# Patient Record
Sex: FEMALE | Race: OTHER | NOT HISPANIC OR LATINO | Employment: OTHER | RURAL
[De-identification: names, ages, dates, MRNs, and addresses within clinical notes are randomized per-mention and may not be internally consistent; named-entity substitution may affect disease eponyms.]

---

## 2020-03-09 ENCOUNTER — HISTORICAL (OUTPATIENT)
Dept: ADMINISTRATIVE | Facility: HOSPITAL | Age: 75
End: 2020-03-09

## 2020-07-14 ENCOUNTER — HISTORICAL (OUTPATIENT)
Dept: ADMINISTRATIVE | Facility: HOSPITAL | Age: 75
End: 2020-07-14

## 2020-07-14 LAB — GLUCOSE SERPL-MCNC: 88 MG/DL (ref 70–105)

## 2020-07-15 ENCOUNTER — HISTORICAL (OUTPATIENT)
Dept: ADMINISTRATIVE | Facility: HOSPITAL | Age: 75
End: 2020-07-15

## 2020-07-15 LAB
ALBUMIN SERPL BCP-MCNC: 2.5 G/DL (ref 3.5–5)
ALP SERPL-CCNC: 81 U/L (ref 55–142)
ALT SERPL W P-5'-P-CCNC: 10 U/L (ref 13–56)
ANION GAP SERPL CALCULATED.3IONS-SCNC: 11 MMOL/L
ANISOCYTOSIS BLD QL SMEAR: ABNORMAL
AST SERPL W P-5'-P-CCNC: 20 U/L (ref 15–37)
BASOPHILS # BLD AUTO: 0.04 X10E3/UL (ref 0–0.2)
BASOPHILS NFR BLD AUTO: 0.6 % (ref 0–1)
BILIRUB DIRECT SERPL-MCNC: 0.1 MG/DL (ref 0–0.2)
BILIRUB SERPL-MCNC: 0.4 MG/DL (ref 0–1.2)
BUN SERPL-MCNC: 14 MG/DL (ref 7–18)
CALCIUM SERPL-MCNC: 9 MG/DL (ref 8.5–10.1)
CHLORIDE SERPL-SCNC: 100 MMOL/L (ref 98–107)
CK MB SERPL-MCNC: 2.2 NG/ML (ref 1–3.6)
CK MB SERPL-MCNC: 2.3 NG/ML (ref 1–3.6)
CK MB SERPL-MCNC: 2.3 NG/ML (ref 1–3.6)
CK SERPL-CCNC: 42 U/L (ref 26–192)
CK SERPL-CCNC: 60 U/L (ref 26–192)
CK SERPL-CCNC: 66 U/L (ref 26–192)
CO2 SERPL-SCNC: 33 MMOL/L (ref 21–32)
CREAT SERPL-MCNC: 1.03 MG/DL (ref 0.55–1.02)
EOSINOPHIL # BLD AUTO: 0.29 X10E3/UL (ref 0–0.5)
EOSINOPHIL NFR BLD AUTO: 4.3 % (ref 1–4)
EOSINOPHIL NFR BLD MANUAL: 5 % (ref 1–4)
ERYTHROCYTE [DISTWIDTH] IN BLOOD BY AUTOMATED COUNT: 16.2 % (ref 11.5–14.5)
GLUCOSE SERPL-MCNC: 103 MG/DL (ref 70–105)
GLUCOSE SERPL-MCNC: 104 MG/DL (ref 74–106)
GLUCOSE SERPL-MCNC: 127 MG/DL (ref 70–105)
GLUCOSE SERPL-MCNC: 182 MG/DL (ref 70–105)
GLUCOSE SERPL-MCNC: 219 MG/DL (ref 70–105)
HCT VFR BLD AUTO: 36.8 % (ref 38–47)
HGB BLD-MCNC: 11.4 G/DL (ref 12–16)
IMM GRANULOCYTES # BLD AUTO: 0.01 X10E3/UL (ref 0–0.04)
IMM GRANULOCYTES NFR BLD: 0.1 % (ref 0–0.4)
LYMPHOCYTES # BLD AUTO: 0.79 X10E3/UL (ref 1–4.8)
LYMPHOCYTES NFR BLD AUTO: 11.6 % (ref 27–41)
LYMPHOCYTES NFR BLD MANUAL: 17 % (ref 27–41)
MAGNESIUM SERPL-MCNC: 1.8 MG/DL (ref 1.7–2.3)
MCH RBC QN AUTO: 22.7 PG (ref 27–31)
MCHC RBC AUTO-ENTMCNC: 31 G/DL (ref 32–36)
MCV RBC AUTO: 73.2 FL (ref 80–96)
MICROCYTES BLD QL SMEAR: ABNORMAL
MONOCYTES # BLD AUTO: 0.39 X10E3/UL (ref 0–0.8)
MONOCYTES NFR BLD AUTO: 5.7 % (ref 2–6)
MONOCYTES NFR BLD MANUAL: 4 % (ref 2–6)
MPC BLD CALC-MCNC: 10.5 FL (ref 9.4–12.4)
NEUTROPHILS # BLD AUTO: 5.27 X10E3/UL (ref 1.8–7.7)
NEUTROPHILS NFR BLD AUTO: 77.7 % (ref 53–65)
NEUTS SEG NFR BLD MANUAL: 74 % (ref 50–62)
NRBC # BLD AUTO: 0 X10E3/UL (ref 0–0)
NRBC, AUTO (.00): 0 /100 (ref 0–0)
NT-PROBNP SERPL-MCNC: 6800 PG/ML (ref 1–125)
PLATELET # BLD AUTO: 281 X10E3/UL (ref 150–400)
PLATELET MORPHOLOGY: ABNORMAL
POTASSIUM SERPL-SCNC: 4.7 MMOL/L (ref 3.5–5.1)
PREALB SERPL NEPH-MCNC: 40 MG/DL (ref 20–40)
PROT SERPL-MCNC: 7.9 G/DL (ref 6.4–8.2)
RBC # BLD AUTO: 5.03 X10E6/UL (ref 4.2–5.4)
SODIUM SERPL-SCNC: 139 MMOL/L (ref 136–145)
TROPONIN I SERPL-MCNC: 0.05 NG/ML (ref 0–0.06)
TROPONIN I SERPL-MCNC: 0.28 NG/ML (ref 0–0.06)
TROPONIN I SERPL-MCNC: 0.39 NG/ML (ref 0–0.06)
WBC # BLD AUTO: 6.79 X10E3/UL (ref 4.5–11)

## 2020-07-16 ENCOUNTER — HISTORICAL (OUTPATIENT)
Dept: ADMINISTRATIVE | Facility: HOSPITAL | Age: 75
End: 2020-07-16

## 2020-07-16 LAB
ANION GAP SERPL CALCULATED.3IONS-SCNC: 10 MMOL/L
ANISOCYTOSIS BLD QL SMEAR: ABNORMAL
BASOPHILS # BLD AUTO: 0.06 X10E3/UL (ref 0–0.2)
BASOPHILS NFR BLD AUTO: 0.9 % (ref 0–1)
BUN SERPL-MCNC: 13 MG/DL (ref 7–18)
CALCIUM SERPL-MCNC: 8.9 MG/DL (ref 8.5–10.1)
CHLORIDE SERPL-SCNC: 100 MMOL/L (ref 98–107)
CO2 SERPL-SCNC: 33 MMOL/L (ref 21–32)
CREAT SERPL-MCNC: 1.03 MG/DL (ref 0.55–1.02)
EOSINOPHIL # BLD AUTO: 0.45 X10E3/UL (ref 0–0.5)
EOSINOPHIL NFR BLD AUTO: 7 % (ref 1–4)
ERYTHROCYTE [DISTWIDTH] IN BLOOD BY AUTOMATED COUNT: 15.7 % (ref 11.5–14.5)
GLUCOSE SERPL-MCNC: 118 MG/DL (ref 70–105)
GLUCOSE SERPL-MCNC: 121 MG/DL (ref 74–106)
GLUCOSE SERPL-MCNC: 140 MG/DL (ref 70–105)
GLUCOSE SERPL-MCNC: 154 MG/DL (ref 70–105)
GLUCOSE SERPL-MCNC: 217 MG/DL (ref 70–105)
HCT VFR BLD AUTO: 36.8 % (ref 38–47)
HGB BLD-MCNC: 11.5 G/DL (ref 12–16)
HYPOCHROMIA BLD QL SMEAR: SLIGHT
IMM GRANULOCYTES # BLD AUTO: 0.01 X10E3/UL (ref 0–0.04)
IMM GRANULOCYTES NFR BLD: 0.2 % (ref 0–0.4)
LYMPHOCYTES # BLD AUTO: 0.97 X10E3/UL (ref 1–4.8)
LYMPHOCYTES NFR BLD AUTO: 15.1 % (ref 27–41)
MCH RBC QN AUTO: 22.5 PG (ref 27–31)
MCHC RBC AUTO-ENTMCNC: 31.3 G/DL (ref 32–36)
MCV RBC AUTO: 71.9 FL (ref 80–96)
MICROCYTES BLD QL SMEAR: ABNORMAL
MONOCYTES # BLD AUTO: 0.52 X10E3/UL (ref 0–0.8)
MONOCYTES NFR BLD AUTO: 8.1 % (ref 2–6)
MPC BLD CALC-MCNC: 11.6 FL (ref 9.4–12.4)
NEUTROPHILS # BLD AUTO: 4.4 X10E3/UL (ref 1.8–7.7)
NEUTROPHILS NFR BLD AUTO: 68.7 % (ref 53–65)
NRBC # BLD AUTO: 0 X10E3/UL (ref 0–0)
NRBC, AUTO (.00): 0 /100 (ref 0–0)
OVALOCYTES BLD QL SMEAR: ABNORMAL
PLATELET # BLD AUTO: 219 X10E3/UL (ref 150–400)
PLATELET MORPHOLOGY: NORMAL
POLYCHROMASIA BLD QL SMEAR: ABNORMAL
POTASSIUM SERPL-SCNC: 3.9 MMOL/L (ref 3.5–5.1)
RBC # BLD AUTO: 5.12 X10E6/UL (ref 4.2–5.4)
SODIUM SERPL-SCNC: 139 MMOL/L (ref 136–145)
TARGETS BLD QL SMEAR: ABNORMAL
WBC # BLD AUTO: 6.41 X10E3/UL (ref 4.5–11)

## 2020-07-17 ENCOUNTER — HISTORICAL (OUTPATIENT)
Dept: ADMINISTRATIVE | Facility: HOSPITAL | Age: 75
End: 2020-07-17

## 2020-07-17 LAB
GLUCOSE SERPL-MCNC: 143 MG/DL (ref 70–105)
GLUCOSE SERPL-MCNC: 152 MG/DL (ref 70–105)
GLUCOSE SERPL-MCNC: 203 MG/DL (ref 70–105)
GLUCOSE SERPL-MCNC: 227 MG/DL (ref 70–105)
SARS-COV-2 RNA AMPLIFICATION, QUAL: NEGATIVE

## 2020-07-18 ENCOUNTER — HISTORICAL (OUTPATIENT)
Dept: ADMINISTRATIVE | Facility: HOSPITAL | Age: 75
End: 2020-07-18

## 2020-07-18 LAB
ANION GAP SERPL CALCULATED.3IONS-SCNC: 6 MMOL/L
ANISOCYTOSIS BLD QL SMEAR: ABNORMAL
BASOPHILS # BLD AUTO: 0.04 X10E3/UL (ref 0–0.2)
BASOPHILS NFR BLD AUTO: 0.6 % (ref 0–1)
BUN SERPL-MCNC: 11 MG/DL (ref 7–18)
CALCIUM SERPL-MCNC: 8.8 MG/DL (ref 8.5–10.1)
CHLORIDE SERPL-SCNC: 102 MMOL/L (ref 98–107)
CO2 SERPL-SCNC: 36 MMOL/L (ref 21–32)
CREAT SERPL-MCNC: 0.99 MG/DL (ref 0.55–1.02)
EOSINOPHIL # BLD AUTO: 0.47 X10E3/UL (ref 0–0.5)
EOSINOPHIL NFR BLD AUTO: 7.3 % (ref 1–4)
ERYTHROCYTE [DISTWIDTH] IN BLOOD BY AUTOMATED COUNT: 14.9 % (ref 11.5–14.5)
GLUCOSE SERPL-MCNC: 110 MG/DL (ref 74–106)
GLUCOSE SERPL-MCNC: 117 MG/DL (ref 70–105)
GLUCOSE SERPL-MCNC: 238 MG/DL (ref 70–105)
HCT VFR BLD AUTO: 34.9 % (ref 38–47)
HGB BLD-MCNC: 10.8 G/DL (ref 12–16)
HYPOCHROMIA BLD QL SMEAR: ABNORMAL
IMM GRANULOCYTES # BLD AUTO: 0.01 X10E3/UL (ref 0–0.04)
IMM GRANULOCYTES NFR BLD: 0.2 % (ref 0–0.4)
LYMPHOCYTES # BLD AUTO: 1.22 X10E3/UL (ref 1–4.8)
LYMPHOCYTES NFR BLD AUTO: 18.8 % (ref 27–41)
MAGNESIUM SERPL-MCNC: 1.6 MG/DL (ref 1.7–2.3)
MCH RBC QN AUTO: 22.8 PG (ref 27–31)
MCHC RBC AUTO-ENTMCNC: 30.9 G/DL (ref 32–36)
MCV RBC AUTO: 73.6 FL (ref 80–96)
MICROCYTES BLD QL SMEAR: ABNORMAL
MONOCYTES # BLD AUTO: 0.57 X10E3/UL (ref 0–0.8)
MONOCYTES NFR BLD AUTO: 8.8 % (ref 2–6)
MPC BLD CALC-MCNC: 11.8 FL (ref 9.4–12.4)
NEUTROPHILS # BLD AUTO: 4.17 X10E3/UL (ref 1.8–7.7)
NEUTROPHILS NFR BLD AUTO: 64.3 % (ref 53–65)
NRBC # BLD AUTO: 0 X10E3/UL (ref 0–0)
NRBC, AUTO (.00): 0 /100 (ref 0–0)
NT-PROBNP SERPL-MCNC: 4863 PG/ML (ref 1–125)
OVALOCYTES BLD QL SMEAR: ABNORMAL
PLATELET # BLD AUTO: 249 X10E3/UL (ref 150–400)
PLATELET MORPHOLOGY: NORMAL
POLYCHROMASIA BLD QL SMEAR: ABNORMAL
POTASSIUM SERPL-SCNC: 3.4 MMOL/L (ref 3.5–5.1)
RBC # BLD AUTO: 4.74 X10E6/UL (ref 4.2–5.4)
SODIUM SERPL-SCNC: 141 MMOL/L (ref 136–145)
T4 SERPL-MCNC: 8.6 UG/DL (ref 4.8–13.9)
TARGETS BLD QL SMEAR: ABNORMAL
TSH SERPL DL<=0.005 MIU/L-ACNC: 1.54 UIU/ML (ref 0.36–3.74)
WBC # BLD AUTO: 6.48 X10E3/UL (ref 4.5–11)

## 2020-10-07 ENCOUNTER — HISTORICAL (OUTPATIENT)
Dept: ADMINISTRATIVE | Facility: HOSPITAL | Age: 75
End: 2020-10-07

## 2021-03-05 DIAGNOSIS — Z12.31 SCREENING MAMMOGRAM FOR HIGH-RISK PATIENT: Primary | ICD-10-CM

## 2021-04-08 ENCOUNTER — OFFICE VISIT (OUTPATIENT)
Dept: INTERNAL MEDICINE | Facility: CLINIC | Age: 76
End: 2021-04-08
Payer: COMMERCIAL

## 2021-04-08 VITALS
RESPIRATION RATE: 19 BRPM | SYSTOLIC BLOOD PRESSURE: 148 MMHG | HEART RATE: 74 BPM | OXYGEN SATURATION: 93 % | DIASTOLIC BLOOD PRESSURE: 68 MMHG

## 2021-04-08 DIAGNOSIS — E08.00 DIABETES MELLITUS DUE TO UNDERLYING CONDITION WITH HYPEROSMOLARITY WITHOUT COMA, WITHOUT LONG-TERM CURRENT USE OF INSULIN: ICD-10-CM

## 2021-04-08 DIAGNOSIS — R01.1 HEART MURMUR: ICD-10-CM

## 2021-04-08 DIAGNOSIS — J30.1 SEASONAL ALLERGIC RHINITIS DUE TO POLLEN: ICD-10-CM

## 2021-04-08 DIAGNOSIS — K21.9 GASTROESOPHAGEAL REFLUX DISEASE, UNSPECIFIED WHETHER ESOPHAGITIS PRESENT: ICD-10-CM

## 2021-04-08 DIAGNOSIS — L30.9 DERMATITIS: ICD-10-CM

## 2021-04-08 DIAGNOSIS — I10 ESSENTIAL HYPERTENSION: Primary | Chronic | ICD-10-CM

## 2021-04-08 PROCEDURE — 99213 OFFICE O/P EST LOW 20 MIN: CPT | Mod: S$PBB,,, | Performed by: INTERNAL MEDICINE

## 2021-04-08 PROCEDURE — 99999 PR PBB SHADOW E&M-EST. PATIENT-LVL III: ICD-10-PCS | Mod: PBBFAC,,, | Performed by: INTERNAL MEDICINE

## 2021-04-08 PROCEDURE — 99213 OFFICE O/P EST LOW 20 MIN: CPT | Mod: PBBFAC | Performed by: INTERNAL MEDICINE

## 2021-04-08 PROCEDURE — 99999 PR PBB SHADOW E&M-EST. PATIENT-LVL III: CPT | Mod: PBBFAC,,, | Performed by: INTERNAL MEDICINE

## 2021-04-08 PROCEDURE — 99213 PR OFFICE/OUTPT VISIT, EST, LEVL III, 20-29 MIN: ICD-10-PCS | Mod: S$PBB,,, | Performed by: INTERNAL MEDICINE

## 2021-04-08 RX ORDER — FUROSEMIDE 20 MG/1
1 TABLET ORAL 2 TIMES DAILY
COMMUNITY
Start: 2021-02-19 | End: 2021-08-03

## 2021-04-08 RX ORDER — LORATADINE 10 MG/1
10 TABLET ORAL DAILY
COMMUNITY
End: 2021-04-08 | Stop reason: SDUPTHER

## 2021-04-08 RX ORDER — LOSARTAN POTASSIUM 100 MG/1
100 TABLET ORAL DAILY
COMMUNITY
End: 2021-09-14 | Stop reason: SDUPTHER

## 2021-04-08 RX ORDER — OMEPRAZOLE 20 MG/1
1 CAPSULE, DELAYED RELEASE ORAL DAILY
COMMUNITY
Start: 2021-02-19 | End: 2021-04-08 | Stop reason: SDUPTHER

## 2021-04-08 RX ORDER — METOPROLOL TARTRATE 25 MG/1
1 TABLET, FILM COATED ORAL 3 TIMES DAILY
COMMUNITY
Start: 2021-02-20 | End: 2021-07-20 | Stop reason: SDUPTHER

## 2021-04-08 RX ORDER — TRIAMCINOLONE ACETONIDE 1 MG/G
CREAM TOPICAL
COMMUNITY
Start: 2021-03-03 | End: 2021-04-08 | Stop reason: SDUPTHER

## 2021-04-08 RX ORDER — DULOXETIN HYDROCHLORIDE 30 MG/1
30 CAPSULE, DELAYED RELEASE ORAL DAILY
Status: ON HOLD | COMMUNITY
End: 2021-08-03

## 2021-04-08 RX ORDER — TRIAMCINOLONE ACETONIDE 1 MG/G
CREAM TOPICAL 2 TIMES DAILY
Qty: 454 G | Refills: 1 | Status: SHIPPED | OUTPATIENT
Start: 2021-04-08 | End: 2021-07-20 | Stop reason: SDUPTHER

## 2021-04-08 RX ORDER — AMLODIPINE BESYLATE 5 MG/1
1 TABLET ORAL DAILY
COMMUNITY
Start: 2021-02-20 | End: 2021-08-25 | Stop reason: SDUPTHER

## 2021-04-08 RX ORDER — PREDNISONE 5 MG/1
5 TABLET ORAL DAILY PRN
COMMUNITY
Start: 2021-02-19 | End: 2022-08-16 | Stop reason: DRUGHIGH

## 2021-04-08 RX ORDER — SYRINGE AND NEEDLE,INSULIN,1ML 31GX15/64"
SYRINGE, EMPTY DISPOSABLE MISCELLANEOUS
COMMUNITY

## 2021-04-08 RX ORDER — LORATADINE 10 MG/1
10 TABLET ORAL DAILY
Qty: 90 TABLET | Refills: 1 | Status: SHIPPED | OUTPATIENT
Start: 2021-04-08 | End: 2021-12-01 | Stop reason: SDUPTHER

## 2021-04-08 RX ORDER — VENLAFAXINE HYDROCHLORIDE 75 MG/1
1 CAPSULE, EXTENDED RELEASE ORAL EVERY OTHER DAY
COMMUNITY
Start: 2020-12-30 | End: 2022-01-10

## 2021-04-08 RX ORDER — PANTOPRAZOLE SODIUM 40 MG/1
40 TABLET, DELAYED RELEASE ORAL DAILY
Qty: 60 TABLET | Refills: 6 | Status: SHIPPED | OUTPATIENT
Start: 2021-04-08 | End: 2021-09-14 | Stop reason: SDUPTHER

## 2021-04-08 RX ORDER — SYRINGE AND NEEDLE,INSULIN,1ML 31GX15/64"
SYRINGE, EMPTY DISPOSABLE MISCELLANEOUS 2 TIMES DAILY
Qty: 200 SYRINGE | Refills: 5 | Status: CANCELLED | OUTPATIENT
Start: 2021-04-08

## 2021-04-08 RX ORDER — ATORVASTATIN CALCIUM 40 MG/1
1 TABLET, FILM COATED ORAL DAILY
COMMUNITY
Start: 2021-02-19 | End: 2021-08-03

## 2021-04-08 RX ORDER — HUMAN INSULIN 100 [USP'U]/ML
INJECTION, SUSPENSION SUBCUTANEOUS 2 TIMES DAILY
COMMUNITY
Start: 2021-01-11 | End: 2022-02-02

## 2021-04-08 RX ORDER — OMEPRAZOLE 20 MG/1
20 CAPSULE, DELAYED RELEASE ORAL DAILY
Qty: 90 CAPSULE | Refills: 1 | Status: SHIPPED | OUTPATIENT
Start: 2021-04-08 | End: 2021-08-03

## 2021-04-08 RX ORDER — METHOTREXATE 2.5 MG/1
TABLET ORAL
Status: ON HOLD | COMMUNITY
Start: 2021-02-20 | End: 2021-08-03

## 2021-04-08 RX ORDER — HYDRALAZINE HYDROCHLORIDE 25 MG/1
1 TABLET, FILM COATED ORAL 4 TIMES DAILY
COMMUNITY
Start: 2021-02-19 | End: 2021-08-03

## 2021-05-10 ENCOUNTER — OFFICE VISIT (OUTPATIENT)
Dept: CARDIOLOGY | Facility: CLINIC | Age: 76
End: 2021-05-10
Payer: COMMERCIAL

## 2021-05-10 VITALS
HEIGHT: 67 IN | OXYGEN SATURATION: 95 % | HEART RATE: 74 BPM | RESPIRATION RATE: 20 BRPM | DIASTOLIC BLOOD PRESSURE: 70 MMHG | SYSTOLIC BLOOD PRESSURE: 128 MMHG

## 2021-05-10 DIAGNOSIS — Z95.1 S/P 2-VESSEL CORONARY ARTERY BYPASS: ICD-10-CM

## 2021-05-10 DIAGNOSIS — I42.0 DILATED CARDIOMYOPATHY: ICD-10-CM

## 2021-05-10 DIAGNOSIS — I21.9 MYOCARDIAL INFARCTION, UNSPECIFIED MI TYPE, UNSPECIFIED ARTERY: ICD-10-CM

## 2021-05-10 DIAGNOSIS — I25.10 CORONARY ARTERY DISEASE INVOLVING NATIVE CORONARY ARTERY OF NATIVE HEART, ANGINA PRESENCE UNSPECIFIED: ICD-10-CM

## 2021-05-10 DIAGNOSIS — I11.0 HYPERTENSIVE HEART DISEASE WITH CHRONIC COMBINED SYSTOLIC AND DIASTOLIC CONGESTIVE HEART FAILURE: ICD-10-CM

## 2021-05-10 DIAGNOSIS — I50.42 HYPERTENSIVE HEART DISEASE WITH CHRONIC COMBINED SYSTOLIC AND DIASTOLIC CONGESTIVE HEART FAILURE: ICD-10-CM

## 2021-05-10 DIAGNOSIS — I25.10 CORONARY ARTERY DISEASE, ANGINA PRESENCE UNSPECIFIED, UNSPECIFIED VESSEL OR LESION TYPE, UNSPECIFIED WHETHER NATIVE OR TRANSPLANTED HEART: Primary | ICD-10-CM

## 2021-05-10 DIAGNOSIS — I27.20 PULMONARY HYPERTENSION: ICD-10-CM

## 2021-05-10 DIAGNOSIS — J44.9 CHRONIC OBSTRUCTIVE PULMONARY DISEASE, UNSPECIFIED COPD TYPE: ICD-10-CM

## 2021-05-10 DIAGNOSIS — M06.9 RHEUMATOID ARTHRITIS, INVOLVING UNSPECIFIED SITE, UNSPECIFIED WHETHER RHEUMATOID FACTOR PRESENT: ICD-10-CM

## 2021-05-10 PROCEDURE — 93010 EKG 12-LEAD: ICD-10-PCS | Mod: S$PBB,,, | Performed by: INTERNAL MEDICINE

## 2021-05-10 PROCEDURE — 93010 ELECTROCARDIOGRAM REPORT: CPT | Mod: S$PBB,,, | Performed by: INTERNAL MEDICINE

## 2021-05-10 PROCEDURE — 93005 ELECTROCARDIOGRAM TRACING: CPT | Mod: PBBFAC | Performed by: INTERNAL MEDICINE

## 2021-05-10 PROCEDURE — 99214 OFFICE O/P EST MOD 30 MIN: CPT | Mod: S$PBB,,, | Performed by: NURSE PRACTITIONER

## 2021-05-10 PROCEDURE — 99214 PR OFFICE/OUTPT VISIT, EST, LEVL IV, 30-39 MIN: ICD-10-PCS | Mod: S$PBB,,, | Performed by: NURSE PRACTITIONER

## 2021-05-10 PROCEDURE — 99215 HC OFFICE/OUTPT VISIT, EST, LEVL V, 40-54 MIN: ICD-10-PCS | Mod: PBBFAC,25,, | Performed by: NURSE PRACTITIONER

## 2021-05-10 PROCEDURE — 99215 OFFICE O/P EST HI 40 MIN: CPT | Mod: PBBFAC,25,, | Performed by: NURSE PRACTITIONER

## 2021-05-10 PROCEDURE — 99215 OFFICE O/P EST HI 40 MIN: CPT | Mod: PBBFAC | Performed by: NURSE PRACTITIONER

## 2021-05-10 RX ORDER — ZOLPIDEM TARTRATE 5 MG/1
5 TABLET ORAL NIGHTLY PRN
COMMUNITY
End: 2022-09-21 | Stop reason: SDUPTHER

## 2021-05-10 RX ORDER — ALBUTEROL SULFATE 90 UG/1
AEROSOL, METERED RESPIRATORY (INHALATION)
COMMUNITY
Start: 2021-02-20 | End: 2021-11-23

## 2021-05-10 RX ORDER — NAPROXEN 500 MG/1
500 TABLET ORAL 2 TIMES DAILY PRN
COMMUNITY
Start: 2021-04-29 | End: 2022-10-11 | Stop reason: SDUPTHER

## 2021-05-13 RX ORDER — HYDRALAZINE HYDROCHLORIDE 25 MG/1
25 TABLET, FILM COATED ORAL 3 TIMES DAILY
Qty: 90 TABLET | Refills: 1 | Status: SHIPPED | OUTPATIENT
Start: 2021-05-13 | End: 2021-09-14 | Stop reason: SDUPTHER

## 2021-05-13 RX ORDER — SYRINGE,SAFETY WITH NEEDLE,1ML 25GX1"
SYRINGE (EA) MISCELLANEOUS
Qty: 200 EACH | Refills: 3 | Status: SHIPPED | OUTPATIENT
Start: 2021-05-13 | End: 2022-04-20 | Stop reason: SDUPTHER

## 2021-05-13 RX ORDER — ATORVASTATIN CALCIUM 40 MG/1
40 TABLET, FILM COATED ORAL DAILY
Qty: 90 TABLET | Refills: 1 | Status: SHIPPED | OUTPATIENT
Start: 2021-05-13 | End: 2021-10-21 | Stop reason: SDUPTHER

## 2021-07-20 DIAGNOSIS — L30.9 DERMATITIS: ICD-10-CM

## 2021-07-20 RX ORDER — METOPROLOL TARTRATE 25 MG/1
25 TABLET, FILM COATED ORAL 3 TIMES DAILY
Qty: 90 TABLET | Refills: 0 | Status: SHIPPED | OUTPATIENT
Start: 2021-07-20 | End: 2021-08-25 | Stop reason: SDUPTHER

## 2021-07-20 RX ORDER — TRIAMCINOLONE ACETONIDE 1 MG/G
CREAM TOPICAL 2 TIMES DAILY
Qty: 454 G | Refills: 1 | Status: SHIPPED | OUTPATIENT
Start: 2021-07-20 | End: 2021-08-25 | Stop reason: SDUPTHER

## 2021-08-03 ENCOUNTER — HOSPITAL ENCOUNTER (INPATIENT)
Facility: HOSPITAL | Age: 76
LOS: 3 days | Discharge: ANOTHER HOSPITAL | DRG: 291 | End: 2021-08-06
Attending: INTERNAL MEDICINE | Admitting: INTERNAL MEDICINE
Payer: COMMERCIAL

## 2021-08-03 ENCOUNTER — OFFICE VISIT (OUTPATIENT)
Dept: FAMILY MEDICINE | Facility: CLINIC | Age: 76
End: 2021-08-03
Payer: COMMERCIAL

## 2021-08-03 VITALS
OXYGEN SATURATION: 88 % | HEIGHT: 67 IN | HEART RATE: 78 BPM | RESPIRATION RATE: 20 BRPM | SYSTOLIC BLOOD PRESSURE: 153 MMHG | DIASTOLIC BLOOD PRESSURE: 80 MMHG

## 2021-08-03 DIAGNOSIS — R06.02 SHORTNESS OF BREATH: ICD-10-CM

## 2021-08-03 DIAGNOSIS — R06.02 SOB (SHORTNESS OF BREATH) ON EXERTION: ICD-10-CM

## 2021-08-03 DIAGNOSIS — I50.23 ACUTE ON CHRONIC SYSTOLIC CONGESTIVE HEART FAILURE: ICD-10-CM

## 2021-08-03 DIAGNOSIS — I50.9 CONGESTIVE HEART FAILURE, UNSPECIFIED HF CHRONICITY, UNSPECIFIED HEART FAILURE TYPE: Primary | ICD-10-CM

## 2021-08-03 DIAGNOSIS — U07.1 COVID-19 VIRUS INFECTION: Primary | ICD-10-CM

## 2021-08-03 DIAGNOSIS — S81.802A WOUND OF LEFT LOWER EXTREMITY, INITIAL ENCOUNTER: ICD-10-CM

## 2021-08-03 DIAGNOSIS — I10 ESSENTIAL HYPERTENSION: ICD-10-CM

## 2021-08-03 DIAGNOSIS — R09.89 LUNG CRACKLES: ICD-10-CM

## 2021-08-03 DIAGNOSIS — I10 ESSENTIAL HYPERTENSION: Chronic | ICD-10-CM

## 2021-08-03 DIAGNOSIS — E11.59 TYPE 2 DIABETES MELLITUS WITH OTHER CIRCULATORY COMPLICATION, UNSPECIFIED WHETHER LONG TERM INSULIN USE: Chronic | ICD-10-CM

## 2021-08-03 PROBLEM — E11.9 DIABETES MELLITUS, TYPE 2: Chronic | Status: ACTIVE | Noted: 2021-08-03

## 2021-08-03 LAB
ANION GAP SERPL CALCULATED.3IONS-SCNC: 10 MMOL/L (ref 7–16)
ANION GAP SERPL CALCULATED.3IONS-SCNC: 8 MMOL/L (ref 7–16)
BASOPHILS # BLD AUTO: 0.03 K/UL (ref 0–0.2)
BASOPHILS # BLD AUTO: 0.07 K/UL (ref 0–0.2)
BASOPHILS NFR BLD AUTO: 0.4 % (ref 0–1)
BASOPHILS NFR BLD AUTO: 0.7 % (ref 0–1)
BUN SERPL-MCNC: 23 MG/DL (ref 7–18)
BUN SERPL-MCNC: 24 MG/DL (ref 7–18)
BUN/CREAT SERPL: 24 (ref 6–20)
BUN/CREAT SERPL: 27 (ref 6–20)
CALCIUM SERPL-MCNC: 9.1 MG/DL (ref 8.5–10.1)
CALCIUM SERPL-MCNC: 9.2 MG/DL (ref 8.5–10.1)
CHLORIDE SERPL-SCNC: 106 MMOL/L (ref 98–107)
CHLORIDE SERPL-SCNC: 107 MMOL/L (ref 98–107)
CO2 SERPL-SCNC: 32 MMOL/L (ref 21–32)
CO2 SERPL-SCNC: 32 MMOL/L (ref 21–32)
CREAT SERPL-MCNC: 0.88 MG/DL (ref 0.55–1.02)
CREAT SERPL-MCNC: 0.97 MG/DL (ref 0.55–1.02)
D DIMER PPP FEU-MCNC: 1.79 ΜG/ML (ref 0–0.47)
DIFFERENTIAL METHOD BLD: ABNORMAL
DIFFERENTIAL METHOD BLD: ABNORMAL
EOSINOPHIL # BLD AUTO: 0.17 K/UL (ref 0–0.5)
EOSINOPHIL # BLD AUTO: 0.18 K/UL (ref 0–0.5)
EOSINOPHIL NFR BLD AUTO: 1.9 % (ref 1–4)
EOSINOPHIL NFR BLD AUTO: 2 % (ref 1–4)
ERYTHROCYTE [DISTWIDTH] IN BLOOD BY AUTOMATED COUNT: 15.9 % (ref 11.5–14.5)
ERYTHROCYTE [DISTWIDTH] IN BLOOD BY AUTOMATED COUNT: 18.3 % (ref 11.5–14.5)
FLUAV AG UPPER RESP QL IA.RAPID: NEGATIVE
FLUBV AG UPPER RESP QL IA.RAPID: NEGATIVE
GLUCOSE SERPL-MCNC: 45 MG/DL (ref 74–106)
GLUCOSE SERPL-MCNC: 47 MG/DL (ref 70–105)
GLUCOSE SERPL-MCNC: 47 MG/DL (ref 74–106)
GLUCOSE SERPL-MCNC: 88 MG/DL (ref 70–105)
HCT VFR BLD AUTO: 37.6 % (ref 38–47)
HCT VFR BLD AUTO: 40.2 % (ref 38–47)
HGB BLD-MCNC: 12.1 G/DL (ref 12–16)
HGB BLD-MCNC: 12.9 G/DL (ref 12–16)
IMM GRANULOCYTES # BLD AUTO: 0.01 K/UL (ref 0–0.04)
IMM GRANULOCYTES # BLD AUTO: 0.02 K/UL (ref 0–0.04)
IMM GRANULOCYTES NFR BLD: 0.1 % (ref 0–0.4)
IMM GRANULOCYTES NFR BLD: 0.2 % (ref 0–0.4)
LYMPHOCYTES # BLD AUTO: 0.97 K/UL (ref 1–4.8)
LYMPHOCYTES # BLD AUTO: 1.32 K/UL (ref 1–4.8)
LYMPHOCYTES NFR BLD AUTO: 11.6 % (ref 27–41)
LYMPHOCYTES NFR BLD AUTO: 14 % (ref 27–41)
MAGNESIUM SERPL-MCNC: 1.8 MG/DL (ref 1.7–2.3)
MCH RBC QN AUTO: 24.8 PG (ref 27–31)
MCH RBC QN AUTO: 26.4 PG (ref 27–31)
MCHC RBC AUTO-ENTMCNC: 32.1 G/DL (ref 32–36)
MCHC RBC AUTO-ENTMCNC: 32.2 G/DL (ref 32–36)
MCV RBC AUTO: 77 FL (ref 80–96)
MCV RBC AUTO: 82.4 FL (ref 80–96)
MONOCYTES # BLD AUTO: 0.42 K/UL (ref 0–0.8)
MONOCYTES # BLD AUTO: 0.42 K/UL (ref 0–0.8)
MONOCYTES NFR BLD AUTO: 4.4 % (ref 2–6)
MONOCYTES NFR BLD AUTO: 5 % (ref 2–6)
MPC BLD CALC-MCNC: 10.6 FL (ref 9.4–12.4)
MPC BLD CALC-MCNC: 12.1 FL (ref 9.4–12.4)
NEUTROPHILS # BLD AUTO: 6.73 K/UL (ref 1.8–7.7)
NEUTROPHILS # BLD AUTO: 7.44 K/UL (ref 1.8–7.7)
NEUTROPHILS NFR BLD AUTO: 78.8 % (ref 53–65)
NEUTROPHILS NFR BLD AUTO: 80.9 % (ref 53–65)
NRBC # BLD AUTO: 0 X10E3/UL
NRBC # BLD AUTO: 0 X10E3/UL
NRBC, AUTO (.00): 0 %
NRBC, AUTO (.00): 0 %
NT-PROBNP SERPL-MCNC: 2453 PG/ML (ref 1–125)
NT-PROBNP SERPL-MCNC: 2643 PG/ML (ref 1–125)
PLATELET # BLD AUTO: 231 K/UL (ref 150–400)
PLATELET # BLD AUTO: 309 K/UL (ref 150–400)
POTASSIUM SERPL-SCNC: 4.2 MMOL/L (ref 3.5–5.1)
POTASSIUM SERPL-SCNC: 4.2 MMOL/L (ref 3.5–5.1)
RBC # BLD AUTO: 4.88 M/UL (ref 4.2–5.4)
RBC # BLD AUTO: 4.88 M/UL (ref 4.2–5.4)
SARS-COV+SARS-COV-2 AG RESP QL IA.RAPID: POSITIVE
SODIUM SERPL-SCNC: 143 MMOL/L (ref 136–145)
SODIUM SERPL-SCNC: 144 MMOL/L (ref 136–145)
WBC # BLD AUTO: 8.33 K/UL (ref 4.5–11)
WBC # BLD AUTO: 9.45 K/UL (ref 4.5–11)

## 2021-08-03 PROCEDURE — 99284 EMERGENCY DEPT VISIT MOD MDM: CPT | Mod: ,,, | Performed by: NURSE PRACTITIONER

## 2021-08-03 PROCEDURE — 87428 SARSCOV & INF VIR A&B AG IA: CPT | Performed by: NURSE PRACTITIONER

## 2021-08-03 PROCEDURE — 80048 BASIC METABOLIC PNL TOTAL CA: CPT | Performed by: FAMILY MEDICINE

## 2021-08-03 PROCEDURE — 85025 COMPLETE CBC W/AUTO DIFF WBC: CPT | Performed by: FAMILY MEDICINE

## 2021-08-03 PROCEDURE — 85025 CBC WITH DIFFERENTIAL: ICD-10-PCS | Mod: QW,,, | Performed by: CLINICAL MEDICAL LABORATORY

## 2021-08-03 PROCEDURE — 93010 ELECTROCARDIOGRAM REPORT: CPT | Mod: ,,, | Performed by: HOSPITALIST

## 2021-08-03 PROCEDURE — 99214 OFFICE O/P EST MOD 30 MIN: CPT | Mod: ,,, | Performed by: INTERNAL MEDICINE

## 2021-08-03 PROCEDURE — 93010 EKG 12-LEAD: ICD-10-PCS | Mod: ,,, | Performed by: HOSPITALIST

## 2021-08-03 PROCEDURE — 63600175 PHARM REV CODE 636 W HCPCS: Performed by: NURSE PRACTITIONER

## 2021-08-03 PROCEDURE — 99223 1ST HOSP IP/OBS HIGH 75: CPT | Mod: GC,,, | Performed by: INTERNAL MEDICINE

## 2021-08-03 PROCEDURE — 99223 PR INITIAL HOSPITAL CARE,LEVL III: ICD-10-PCS | Mod: GC,,, | Performed by: INTERNAL MEDICINE

## 2021-08-03 PROCEDURE — 83735 ASSAY OF MAGNESIUM: CPT | Performed by: NURSE PRACTITIONER

## 2021-08-03 PROCEDURE — 93005 ELECTROCARDIOGRAM TRACING: CPT

## 2021-08-03 PROCEDURE — 83880 NT-PRO NATRIURETIC PEPTIDE: ICD-10-PCS | Mod: QW,,, | Performed by: CLINICAL MEDICAL LABORATORY

## 2021-08-03 PROCEDURE — 83880 ASSAY OF NATRIURETIC PEPTIDE: CPT | Mod: QW,,, | Performed by: CLINICAL MEDICAL LABORATORY

## 2021-08-03 PROCEDURE — 85378 FIBRIN DEGRADE SEMIQUANT: CPT | Performed by: NURSE PRACTITIONER

## 2021-08-03 PROCEDURE — 80048 BASIC METABOLIC PNL TOTAL CA: CPT | Mod: QW,,, | Performed by: CLINICAL MEDICAL LABORATORY

## 2021-08-03 PROCEDURE — 82962 GLUCOSE BLOOD TEST: CPT

## 2021-08-03 PROCEDURE — 85025 COMPLETE CBC W/AUTO DIFF WBC: CPT | Mod: QW,,, | Performed by: CLINICAL MEDICAL LABORATORY

## 2021-08-03 PROCEDURE — 80048 BASIC METABOLIC PANEL: ICD-10-PCS | Mod: QW,,, | Performed by: CLINICAL MEDICAL LABORATORY

## 2021-08-03 PROCEDURE — 36415 COLL VENOUS BLD VENIPUNCTURE: CPT | Performed by: NURSE PRACTITIONER

## 2021-08-03 PROCEDURE — 99285 EMERGENCY DEPT VISIT HI MDM: CPT | Mod: 25

## 2021-08-03 PROCEDURE — 11000001 HC ACUTE MED/SURG PRIVATE ROOM

## 2021-08-03 PROCEDURE — 99284 PR EMERGENCY DEPT VISIT,LEVEL IV: ICD-10-PCS | Mod: ,,, | Performed by: NURSE PRACTITIONER

## 2021-08-03 PROCEDURE — 83880 ASSAY OF NATRIURETIC PEPTIDE: CPT | Performed by: FAMILY MEDICINE

## 2021-08-03 PROCEDURE — 96374 THER/PROPH/DIAG INJ IV PUSH: CPT

## 2021-08-03 PROCEDURE — 99214 PR OFFICE/OUTPT VISIT, EST, LEVL IV, 30-39 MIN: ICD-10-PCS | Mod: ,,, | Performed by: INTERNAL MEDICINE

## 2021-08-03 RX ORDER — TALC
6 POWDER (GRAM) TOPICAL NIGHTLY PRN
Status: DISCONTINUED | OUTPATIENT
Start: 2021-08-03 | End: 2021-08-06 | Stop reason: HOSPADM

## 2021-08-03 RX ORDER — SODIUM CHLORIDE 0.9 % (FLUSH) 0.9 %
10 SYRINGE (ML) INJECTION
Status: DISCONTINUED | OUTPATIENT
Start: 2021-08-03 | End: 2021-08-06 | Stop reason: HOSPADM

## 2021-08-03 RX ORDER — FUROSEMIDE 40 MG/1
40 TABLET ORAL 2 TIMES DAILY
Qty: 60 TABLET | Refills: 1 | Status: SHIPPED | OUTPATIENT
Start: 2021-08-03 | End: 2021-08-25 | Stop reason: SDUPTHER

## 2021-08-03 RX ORDER — AMLODIPINE BESYLATE 5 MG/1
5 TABLET ORAL DAILY
Status: DISCONTINUED | OUTPATIENT
Start: 2021-08-04 | End: 2021-08-06 | Stop reason: HOSPADM

## 2021-08-03 RX ORDER — ONDANSETRON 2 MG/ML
4 INJECTION INTRAMUSCULAR; INTRAVENOUS EVERY 8 HOURS PRN
Status: DISCONTINUED | OUTPATIENT
Start: 2021-08-03 | End: 2021-08-06 | Stop reason: HOSPADM

## 2021-08-03 RX ORDER — POTASSIUM CHLORIDE 750 MG/1
10 TABLET, EXTENDED RELEASE ORAL 2 TIMES DAILY
Qty: 60 TABLET | Refills: 0 | Status: SHIPPED | OUTPATIENT
Start: 2021-08-03 | End: 2022-02-15 | Stop reason: DRUGHIGH

## 2021-08-03 RX ORDER — METOPROLOL TARTRATE 25 MG/1
12.5 TABLET ORAL 3 TIMES DAILY
Status: DISCONTINUED | OUTPATIENT
Start: 2021-08-04 | End: 2021-08-06 | Stop reason: HOSPADM

## 2021-08-03 RX ORDER — GLUCAGON 1 MG
1 KIT INJECTION
Status: DISCONTINUED | OUTPATIENT
Start: 2021-08-03 | End: 2021-08-06 | Stop reason: HOSPADM

## 2021-08-03 RX ORDER — ACETAMINOPHEN 325 MG/1
650 TABLET ORAL EVERY 4 HOURS PRN
Status: DISCONTINUED | OUTPATIENT
Start: 2021-08-03 | End: 2021-08-06 | Stop reason: HOSPADM

## 2021-08-03 RX ORDER — MUPIROCIN 20 MG/G
OINTMENT TOPICAL 3 TIMES DAILY
Qty: 1 TUBE | Refills: 1 | Status: SHIPPED | OUTPATIENT
Start: 2021-08-03 | End: 2022-02-15 | Stop reason: DRUGHIGH

## 2021-08-03 RX ORDER — HYDRALAZINE HYDROCHLORIDE 25 MG/1
25 TABLET, FILM COATED ORAL 3 TIMES DAILY
Status: DISCONTINUED | OUTPATIENT
Start: 2021-08-04 | End: 2021-08-06 | Stop reason: HOSPADM

## 2021-08-03 RX ORDER — FUROSEMIDE 80 MG/1
TABLET ORAL
Qty: 6 TABLET | Refills: 0 | Status: ON HOLD | OUTPATIENT
Start: 2021-08-03 | End: 2021-08-03

## 2021-08-03 RX ORDER — DULOXETIN HYDROCHLORIDE 30 MG/1
30 CAPSULE, DELAYED RELEASE ORAL DAILY
Status: DISCONTINUED | OUTPATIENT
Start: 2021-08-04 | End: 2021-08-06 | Stop reason: HOSPADM

## 2021-08-03 RX ORDER — LANOLIN ALCOHOL/MO/W.PET/CERES
800 CREAM (GRAM) TOPICAL
Status: DISCONTINUED | OUTPATIENT
Start: 2021-08-03 | End: 2021-08-06 | Stop reason: HOSPADM

## 2021-08-03 RX ORDER — POTASSIUM CHLORIDE 750 MG/1
10 TABLET, EXTENDED RELEASE ORAL 2 TIMES DAILY
Status: DISCONTINUED | OUTPATIENT
Start: 2021-08-04 | End: 2021-08-06 | Stop reason: HOSPADM

## 2021-08-03 RX ORDER — METOPROLOL TARTRATE 25 MG/1
25 TABLET, FILM COATED ORAL 3 TIMES DAILY
Status: DISCONTINUED | OUTPATIENT
Start: 2021-08-04 | End: 2021-08-03

## 2021-08-03 RX ORDER — PANTOPRAZOLE SODIUM 40 MG/1
40 TABLET, DELAYED RELEASE ORAL DAILY
Status: DISCONTINUED | OUTPATIENT
Start: 2021-08-04 | End: 2021-08-06 | Stop reason: HOSPADM

## 2021-08-03 RX ORDER — INSULIN ASPART 100 [IU]/ML
0-5 INJECTION, SOLUTION INTRAVENOUS; SUBCUTANEOUS
Status: DISCONTINUED | OUTPATIENT
Start: 2021-08-03 | End: 2021-08-06 | Stop reason: HOSPADM

## 2021-08-03 RX ORDER — FUROSEMIDE 10 MG/ML
40 INJECTION INTRAMUSCULAR; INTRAVENOUS
Status: COMPLETED | OUTPATIENT
Start: 2021-08-03 | End: 2021-08-03

## 2021-08-03 RX ORDER — ATORVASTATIN CALCIUM 40 MG/1
40 TABLET, FILM COATED ORAL DAILY
Status: DISCONTINUED | OUTPATIENT
Start: 2021-08-04 | End: 2021-08-06 | Stop reason: HOSPADM

## 2021-08-03 RX ORDER — FUROSEMIDE 10 MG/ML
40 INJECTION INTRAMUSCULAR; INTRAVENOUS EVERY 8 HOURS
Status: DISCONTINUED | OUTPATIENT
Start: 2021-08-03 | End: 2021-08-06 | Stop reason: HOSPADM

## 2021-08-03 RX ORDER — LOSARTAN POTASSIUM 100 MG/1
100 TABLET ORAL DAILY
Status: DISCONTINUED | OUTPATIENT
Start: 2021-08-04 | End: 2021-08-06 | Stop reason: HOSPADM

## 2021-08-03 RX ORDER — ALBUTEROL SULFATE 90 UG/1
1 AEROSOL, METERED RESPIRATORY (INHALATION) EVERY 4 HOURS PRN
Status: DISCONTINUED | OUTPATIENT
Start: 2021-08-03 | End: 2021-08-06 | Stop reason: HOSPADM

## 2021-08-03 RX ORDER — ASPIRIN 81 MG/1
81 TABLET ORAL DAILY
COMMUNITY

## 2021-08-03 RX ADMIN — FUROSEMIDE 40 MG: 10 INJECTION, SOLUTION INTRAVENOUS at 06:08

## 2021-08-04 LAB
ALBUMIN SERPL BCP-MCNC: 2.7 G/DL (ref 3.5–5)
ALP SERPL-CCNC: 98 U/L (ref 55–142)
ALT SERPL W P-5'-P-CCNC: 31 U/L (ref 13–56)
ANION GAP SERPL CALCULATED.3IONS-SCNC: 12 MMOL/L (ref 7–16)
APTT PPP: 30.8 SECONDS (ref 25.2–37.3)
AST SERPL W P-5'-P-CCNC: 24 U/L (ref 15–37)
BASOPHILS # BLD AUTO: 0.06 K/UL (ref 0–0.2)
BASOPHILS NFR BLD AUTO: 0.8 % (ref 0–1)
BILIRUB DIRECT SERPL-MCNC: 0.1 MG/DL (ref 0–0.2)
BILIRUB SERPL-MCNC: 0.4 MG/DL (ref 0–1.2)
BUN SERPL-MCNC: 22 MG/DL (ref 7–18)
BUN/CREAT SERPL: 23 (ref 6–20)
CALCIUM SERPL-MCNC: 9 MG/DL (ref 8.5–10.1)
CHLORIDE SERPL-SCNC: 105 MMOL/L (ref 98–107)
CO2 SERPL-SCNC: 33 MMOL/L (ref 21–32)
CREAT SERPL-MCNC: 0.95 MG/DL (ref 0.55–1.02)
DIFFERENTIAL METHOD BLD: ABNORMAL
EOSINOPHIL # BLD AUTO: 0.29 K/UL (ref 0–0.5)
EOSINOPHIL NFR BLD AUTO: 3.9 % (ref 1–4)
ERYTHROCYTE [DISTWIDTH] IN BLOOD BY AUTOMATED COUNT: 15.4 % (ref 11.5–14.5)
GLUCOSE SERPL-MCNC: 141 MG/DL (ref 70–105)
GLUCOSE SERPL-MCNC: 74 MG/DL (ref 74–106)
GLUCOSE SERPL-MCNC: 80 MG/DL (ref 70–105)
GLUCOSE SERPL-MCNC: 84 MG/DL (ref 70–105)
GLUCOSE SERPL-MCNC: 97 MG/DL (ref 70–105)
HCT VFR BLD AUTO: 37.6 % (ref 38–47)
HGB BLD-MCNC: 12 G/DL (ref 12–16)
IMM GRANULOCYTES # BLD AUTO: 0.02 K/UL (ref 0–0.04)
IMM GRANULOCYTES NFR BLD: 0.3 % (ref 0–0.4)
INR BLD: 1.05 (ref 0.9–1.1)
LYMPHOCYTES # BLD AUTO: 1.19 K/UL (ref 1–4.8)
LYMPHOCYTES NFR BLD AUTO: 15.9 % (ref 27–41)
MCH RBC QN AUTO: 24.7 PG (ref 27–31)
MCHC RBC AUTO-ENTMCNC: 31.9 G/DL (ref 32–36)
MCV RBC AUTO: 77.4 FL (ref 80–96)
MONOCYTES # BLD AUTO: 0.39 K/UL (ref 0–0.8)
MONOCYTES NFR BLD AUTO: 5.2 % (ref 2–6)
MPC BLD CALC-MCNC: 11.5 FL (ref 9.4–12.4)
NEUTROPHILS # BLD AUTO: 5.54 K/UL (ref 1.8–7.7)
NEUTROPHILS NFR BLD AUTO: 73.9 % (ref 53–65)
NRBC # BLD AUTO: 0 X10E3/UL
NRBC, AUTO (.00): 0 %
PLATELET # BLD AUTO: 273 K/UL (ref 150–400)
POTASSIUM SERPL-SCNC: 3.7 MMOL/L (ref 3.5–5.1)
PROT SERPL-MCNC: 6.7 G/DL (ref 6.4–8.2)
PROTHROMBIN TIME: 13.7 SECONDS (ref 11.7–14.7)
RBC # BLD AUTO: 4.86 M/UL (ref 4.2–5.4)
SODIUM SERPL-SCNC: 146 MMOL/L (ref 136–145)
TROPONIN I SERPL-MCNC: 0.17 NG/ML
TROPONIN I SERPL-MCNC: 0.18 NG/ML
TROPONIN I SERPL-MCNC: 0.21 NG/ML
WBC # BLD AUTO: 7.49 K/UL (ref 4.5–11)

## 2021-08-04 PROCEDURE — 63600175 PHARM REV CODE 636 W HCPCS: Performed by: INTERNAL MEDICINE

## 2021-08-04 PROCEDURE — 63600175 PHARM REV CODE 636 W HCPCS: Performed by: REGISTERED NURSE

## 2021-08-04 PROCEDURE — 99232 SBSQ HOSP IP/OBS MODERATE 35: CPT | Mod: ,,, | Performed by: HOSPITALIST

## 2021-08-04 PROCEDURE — 36415 COLL VENOUS BLD VENIPUNCTURE: CPT | Performed by: REGISTERED NURSE

## 2021-08-04 PROCEDURE — 25000242 PHARM REV CODE 250 ALT 637 W/ HCPCS: Performed by: REGISTERED NURSE

## 2021-08-04 PROCEDURE — 99232 PR SUBSEQUENT HOSPITAL CARE,LEVL II: ICD-10-PCS | Mod: ,,, | Performed by: HOSPITALIST

## 2021-08-04 PROCEDURE — 27100098 HC SPACER

## 2021-08-04 PROCEDURE — 93010 ELECTROCARDIOGRAM REPORT: CPT | Mod: ,,, | Performed by: HOSPITALIST

## 2021-08-04 PROCEDURE — 94761 N-INVAS EAR/PLS OXIMETRY MLT: CPT

## 2021-08-04 PROCEDURE — 85730 THROMBOPLASTIN TIME PARTIAL: CPT | Performed by: HOSPITALIST

## 2021-08-04 PROCEDURE — 25000003 PHARM REV CODE 250: Performed by: REGISTERED NURSE

## 2021-08-04 PROCEDURE — 84484 ASSAY OF TROPONIN QUANT: CPT | Performed by: REGISTERED NURSE

## 2021-08-04 PROCEDURE — 11000001 HC ACUTE MED/SURG PRIVATE ROOM

## 2021-08-04 PROCEDURE — 27000221 HC OXYGEN, UP TO 24 HOURS

## 2021-08-04 PROCEDURE — 84484 ASSAY OF TROPONIN QUANT: CPT | Performed by: HOSPITALIST

## 2021-08-04 PROCEDURE — 85610 PROTHROMBIN TIME: CPT | Performed by: HOSPITALIST

## 2021-08-04 PROCEDURE — 85025 COMPLETE CBC W/AUTO DIFF WBC: CPT | Performed by: HOSPITALIST

## 2021-08-04 PROCEDURE — 82248 BILIRUBIN DIRECT: CPT | Performed by: HOSPITALIST

## 2021-08-04 PROCEDURE — 25000003 PHARM REV CODE 250: Performed by: INTERNAL MEDICINE

## 2021-08-04 PROCEDURE — 94640 AIRWAY INHALATION TREATMENT: CPT

## 2021-08-04 PROCEDURE — 93005 ELECTROCARDIOGRAM TRACING: CPT

## 2021-08-04 PROCEDURE — 36415 COLL VENOUS BLD VENIPUNCTURE: CPT | Performed by: HOSPITALIST

## 2021-08-04 PROCEDURE — 82962 GLUCOSE BLOOD TEST: CPT

## 2021-08-04 PROCEDURE — 93010 EKG 12-LEAD: ICD-10-PCS | Mod: ,,, | Performed by: HOSPITALIST

## 2021-08-04 RX ORDER — ZINC SULFATE 50(220)MG
220 CAPSULE ORAL DAILY
Status: DISCONTINUED | OUTPATIENT
Start: 2021-08-04 | End: 2021-08-06 | Stop reason: HOSPADM

## 2021-08-04 RX ORDER — DEXAMETHASONE 4 MG/1
4 TABLET ORAL EVERY 12 HOURS
Status: DISCONTINUED | OUTPATIENT
Start: 2021-08-04 | End: 2021-08-06 | Stop reason: HOSPADM

## 2021-08-04 RX ORDER — ALBUTEROL SULFATE 90 UG/1
2 AEROSOL, METERED RESPIRATORY (INHALATION) EVERY 8 HOURS
Status: DISCONTINUED | OUTPATIENT
Start: 2021-08-04 | End: 2021-08-06 | Stop reason: HOSPADM

## 2021-08-04 RX ORDER — CHOLECALCIFEROL (VITAMIN D3) 25 MCG
1000 TABLET ORAL DAILY
Status: DISCONTINUED | OUTPATIENT
Start: 2021-08-04 | End: 2021-08-06 | Stop reason: HOSPADM

## 2021-08-04 RX ORDER — ASCORBIC ACID 500 MG
500 TABLET ORAL DAILY
Status: DISCONTINUED | OUTPATIENT
Start: 2021-08-04 | End: 2021-08-06 | Stop reason: HOSPADM

## 2021-08-04 RX ADMIN — METOPROLOL TARTRATE 12.5 MG: 25 TABLET, FILM COATED ORAL at 02:08

## 2021-08-04 RX ADMIN — ZINC SULFATE 220 MG (50 MG) CAPSULE 220 MG: CAPSULE at 04:08

## 2021-08-04 RX ADMIN — ALBUTEROL SULFATE 2 PUFF: 90 AEROSOL, METERED RESPIRATORY (INHALATION) at 05:08

## 2021-08-04 RX ADMIN — HYDRALAZINE HYDROCHLORIDE 25 MG: 25 TABLET ORAL at 09:08

## 2021-08-04 RX ADMIN — FUROSEMIDE 40 MG: 10 INJECTION, SOLUTION INTRAVENOUS at 06:08

## 2021-08-04 RX ADMIN — METOPROLOL TARTRATE 12.5 MG: 25 TABLET, FILM COATED ORAL at 09:08

## 2021-08-04 RX ADMIN — FUROSEMIDE 40 MG: 10 INJECTION, SOLUTION INTRAVENOUS at 09:08

## 2021-08-04 RX ADMIN — APIXABAN 5 MG: 5 TABLET, FILM COATED ORAL at 09:08

## 2021-08-04 RX ADMIN — Medication 1000 UNITS: at 04:08

## 2021-08-04 RX ADMIN — POTASSIUM CHLORIDE 10 MEQ: 750 TABLET, FILM COATED, EXTENDED RELEASE ORAL at 09:08

## 2021-08-04 RX ADMIN — DEXAMETHASONE 4 MG: 4 TABLET ORAL at 09:08

## 2021-08-04 RX ADMIN — ATORVASTATIN CALCIUM 40 MG: 40 TABLET, FILM COATED ORAL at 10:08

## 2021-08-04 RX ADMIN — LOSARTAN POTASSIUM 100 MG: 100 TABLET, FILM COATED ORAL at 10:08

## 2021-08-04 RX ADMIN — HUMAN INSULIN 10 UNITS: 100 INJECTION, SUSPENSION SUBCUTANEOUS at 09:08

## 2021-08-04 RX ADMIN — METOPROLOL TARTRATE 12.5 MG: 25 TABLET, FILM COATED ORAL at 10:08

## 2021-08-04 RX ADMIN — AMLODIPINE BESYLATE 5 MG: 5 TABLET ORAL at 10:08

## 2021-08-04 RX ADMIN — FUROSEMIDE 40 MG: 10 INJECTION, SOLUTION INTRAVENOUS at 12:08

## 2021-08-04 RX ADMIN — PANTOPRAZOLE SODIUM 40 MG: 40 TABLET, DELAYED RELEASE ORAL at 10:08

## 2021-08-04 RX ADMIN — FUROSEMIDE 40 MG: 10 INJECTION, SOLUTION INTRAVENOUS at 02:08

## 2021-08-04 RX ADMIN — HYDRALAZINE HYDROCHLORIDE 25 MG: 25 TABLET ORAL at 02:08

## 2021-08-04 RX ADMIN — APIXABAN 5 MG: 5 TABLET, FILM COATED ORAL at 12:08

## 2021-08-04 RX ADMIN — DULOXETINE 30 MG: 30 CAPSULE, DELAYED RELEASE ORAL at 10:08

## 2021-08-04 RX ADMIN — HYDRALAZINE HYDROCHLORIDE 25 MG: 25 TABLET ORAL at 10:08

## 2021-08-04 RX ADMIN — APIXABAN 5 MG: 5 TABLET, FILM COATED ORAL at 10:08

## 2021-08-04 RX ADMIN — OXYCODONE HYDROCHLORIDE AND ACETAMINOPHEN 500 MG: 500 TABLET ORAL at 04:08

## 2021-08-05 LAB
AORTIC ROOT ANNULUS: 2.4 CM
AORTIC VALVE CUSP SEPERATION: 1.81 CM
AV INDEX (PROSTH): 0.58
AV MEAN GRADIENT: 5 MMHG
AV PEAK GRADIENT: 9 MMHG
AV VALVE AREA: 2 CM2
AV VELOCITY RATIO: 0.67
BSA FOR ECHO PROCEDURE: 2.11 M2
CV ECHO LV RWT: 0.57 CM
DOP CALC AO PEAK VEL: 1.5 M/S
DOP CALC AO VTI: 26 CM
DOP CALC LVOT AREA: 3.5 CM2
DOP CALC LVOT DIAMETER: 2.1 CM
DOP CALC LVOT PEAK VEL: 1 M/S
DOP CALC LVOT STROKE VOLUME: 51.93 CM3
DOP CALCLVOT PEAK VEL VTI: 15 CM
E WAVE DECELERATION TIME: 154 MSEC
ECHO EF ESTIMATED: 30 %
ECHO LV POSTERIOR WALL: 1.52 CM (ref 0.6–1.1)
EJECTION FRACTION: 30 %
FRACTIONAL SHORTENING: 11 % (ref 28–44)
GLUCOSE SERPL-MCNC: 121 MG/DL (ref 70–105)
GLUCOSE SERPL-MCNC: 152 MG/DL (ref 70–105)
GLUCOSE SERPL-MCNC: 204 MG/DL (ref 70–105)
GLUCOSE SERPL-MCNC: 257 MG/DL (ref 70–105)
INTERVENTRICULAR SEPTUM: 1.34 CM (ref 0.6–1.1)
IVC OSTIUM: 1.9 CM
LEFT ATRIUM SIZE: 4.1 CM
LEFT INTERNAL DIMENSION IN SYSTOLE: 4.78 CM (ref 2.1–4)
LEFT VENTRICLE MASS INDEX: 164 G/M2
LEFT VENTRICULAR INTERNAL DIMENSION IN DIASTOLE: 5.37 CM (ref 3.5–6)
LEFT VENTRICULAR MASS: 335.55 G
LVOT MG: 2 MMHG
MV PEAK E VEL: 0.52 M/S
RA MAJOR: 3.4 CM
RA PRESSURE: 3 MMHG
RIGHT VENTRICULAR END-DIASTOLIC DIMENSION: 3.9 CM
TRICUSPID ANNULAR PLANE SYSTOLIC EXCURSION: 1.9 CM

## 2021-08-05 PROCEDURE — 11000001 HC ACUTE MED/SURG PRIVATE ROOM

## 2021-08-05 PROCEDURE — 94761 N-INVAS EAR/PLS OXIMETRY MLT: CPT

## 2021-08-05 PROCEDURE — 99900035 HC TECH TIME PER 15 MIN (STAT)

## 2021-08-05 PROCEDURE — 82962 GLUCOSE BLOOD TEST: CPT

## 2021-08-05 PROCEDURE — 94640 AIRWAY INHALATION TREATMENT: CPT

## 2021-08-05 PROCEDURE — 99232 SBSQ HOSP IP/OBS MODERATE 35: CPT | Mod: ,,, | Performed by: HOSPITALIST

## 2021-08-05 PROCEDURE — 25000003 PHARM REV CODE 250: Performed by: INTERNAL MEDICINE

## 2021-08-05 PROCEDURE — 27000221 HC OXYGEN, UP TO 24 HOURS

## 2021-08-05 PROCEDURE — 63600175 PHARM REV CODE 636 W HCPCS: Performed by: INTERNAL MEDICINE

## 2021-08-05 PROCEDURE — 63600175 PHARM REV CODE 636 W HCPCS: Performed by: REGISTERED NURSE

## 2021-08-05 PROCEDURE — 25000003 PHARM REV CODE 250: Performed by: REGISTERED NURSE

## 2021-08-05 PROCEDURE — 25500020 PHARM REV CODE 255: Performed by: HOSPITALIST

## 2021-08-05 PROCEDURE — 99232 PR SUBSEQUENT HOSPITAL CARE,LEVL II: ICD-10-PCS | Mod: ,,, | Performed by: HOSPITALIST

## 2021-08-05 RX ADMIN — ALBUTEROL SULFATE 2 PUFF: 90 AEROSOL, METERED RESPIRATORY (INHALATION) at 03:08

## 2021-08-05 RX ADMIN — LOSARTAN POTASSIUM 100 MG: 100 TABLET, FILM COATED ORAL at 10:08

## 2021-08-05 RX ADMIN — INSULIN ASPART 2 UNITS: 100 INJECTION, SOLUTION INTRAVENOUS; SUBCUTANEOUS at 05:08

## 2021-08-05 RX ADMIN — HYDRALAZINE HYDROCHLORIDE 25 MG: 25 TABLET ORAL at 09:08

## 2021-08-05 RX ADMIN — ZINC SULFATE 220 MG (50 MG) CAPSULE 220 MG: CAPSULE at 10:08

## 2021-08-05 RX ADMIN — POTASSIUM CHLORIDE 10 MEQ: 750 TABLET, FILM COATED, EXTENDED RELEASE ORAL at 10:08

## 2021-08-05 RX ADMIN — ATORVASTATIN CALCIUM 40 MG: 40 TABLET, FILM COATED ORAL at 10:08

## 2021-08-05 RX ADMIN — POTASSIUM CHLORIDE 10 MEQ: 750 TABLET, FILM COATED, EXTENDED RELEASE ORAL at 09:08

## 2021-08-05 RX ADMIN — METOPROLOL TARTRATE 12.5 MG: 25 TABLET, FILM COATED ORAL at 03:08

## 2021-08-05 RX ADMIN — HYDRALAZINE HYDROCHLORIDE 25 MG: 25 TABLET ORAL at 10:08

## 2021-08-05 RX ADMIN — OXYCODONE HYDROCHLORIDE AND ACETAMINOPHEN 500 MG: 500 TABLET ORAL at 10:08

## 2021-08-05 RX ADMIN — METOPROLOL TARTRATE 12.5 MG: 25 TABLET, FILM COATED ORAL at 10:08

## 2021-08-05 RX ADMIN — PANTOPRAZOLE SODIUM 40 MG: 40 TABLET, DELAYED RELEASE ORAL at 10:08

## 2021-08-05 RX ADMIN — DEXAMETHASONE 4 MG: 4 TABLET ORAL at 09:08

## 2021-08-05 RX ADMIN — APIXABAN 5 MG: 5 TABLET, FILM COATED ORAL at 09:08

## 2021-08-05 RX ADMIN — PERFLUTREN 1.5 ML: 6.52 INJECTION, SUSPENSION INTRAVENOUS at 09:08

## 2021-08-05 RX ADMIN — APIXABAN 5 MG: 5 TABLET, FILM COATED ORAL at 10:08

## 2021-08-05 RX ADMIN — ALBUTEROL SULFATE 2 PUFF: 90 AEROSOL, METERED RESPIRATORY (INHALATION) at 09:08

## 2021-08-05 RX ADMIN — DULOXETINE 30 MG: 30 CAPSULE, DELAYED RELEASE ORAL at 10:08

## 2021-08-05 RX ADMIN — FUROSEMIDE 40 MG: 10 INJECTION, SOLUTION INTRAVENOUS at 03:08

## 2021-08-05 RX ADMIN — ALBUTEROL SULFATE 2 PUFF: 90 AEROSOL, METERED RESPIRATORY (INHALATION) at 12:08

## 2021-08-05 RX ADMIN — AMLODIPINE BESYLATE 5 MG: 5 TABLET ORAL at 10:08

## 2021-08-05 RX ADMIN — FUROSEMIDE 40 MG: 10 INJECTION, SOLUTION INTRAVENOUS at 05:08

## 2021-08-05 RX ADMIN — HYDRALAZINE HYDROCHLORIDE 25 MG: 25 TABLET ORAL at 03:08

## 2021-08-05 RX ADMIN — METOPROLOL TARTRATE 12.5 MG: 25 TABLET, FILM COATED ORAL at 09:08

## 2021-08-05 RX ADMIN — INSULIN ASPART 3 UNITS: 100 INJECTION, SOLUTION INTRAVENOUS; SUBCUTANEOUS at 12:08

## 2021-08-05 RX ADMIN — HUMAN INSULIN 10 UNITS: 100 INJECTION, SUSPENSION SUBCUTANEOUS at 09:08

## 2021-08-05 RX ADMIN — Medication 1000 UNITS: at 10:08

## 2021-08-05 RX ADMIN — FUROSEMIDE 40 MG: 10 INJECTION, SOLUTION INTRAVENOUS at 09:08

## 2021-08-06 ENCOUNTER — HOSPITAL ENCOUNTER (INPATIENT)
Facility: HOSPITAL | Age: 76
LOS: 4 days | Discharge: HOME | DRG: 178 | End: 2021-08-10
Attending: FAMILY MEDICINE | Admitting: FAMILY MEDICINE
Payer: COMMERCIAL

## 2021-08-06 VITALS
RESPIRATION RATE: 18 BRPM | HEIGHT: 67 IN | TEMPERATURE: 99 F | BODY MASS INDEX: 32.49 KG/M2 | SYSTOLIC BLOOD PRESSURE: 153 MMHG | OXYGEN SATURATION: 95 % | HEART RATE: 82 BPM | WEIGHT: 207 LBS | DIASTOLIC BLOOD PRESSURE: 77 MMHG

## 2021-08-06 DIAGNOSIS — U07.1 COVID-19: ICD-10-CM

## 2021-08-06 LAB
ANION GAP SERPL CALCULATED.3IONS-SCNC: 9 MMOL/L (ref 7–16)
BASOPHILS # BLD AUTO: 0.02 K/UL (ref 0–0.2)
BASOPHILS NFR BLD AUTO: 0.2 % (ref 0–1)
BUN SERPL-MCNC: 20 MG/DL (ref 7–18)
BUN/CREAT SERPL: 18 (ref 6–20)
CALCIUM SERPL-MCNC: 9.7 MG/DL (ref 8.5–10.1)
CHLORIDE SERPL-SCNC: 102 MMOL/L (ref 98–107)
CO2 SERPL-SCNC: 35 MMOL/L (ref 21–32)
CREAT SERPL-MCNC: 1.14 MG/DL (ref 0.55–1.02)
DIFFERENTIAL METHOD BLD: ABNORMAL
EOSINOPHIL # BLD AUTO: 0.01 K/UL (ref 0–0.5)
EOSINOPHIL NFR BLD AUTO: 0.1 % (ref 1–4)
ERYTHROCYTE [DISTWIDTH] IN BLOOD BY AUTOMATED COUNT: 15.4 % (ref 11.5–14.5)
GLUCOSE SERPL-MCNC: 201 MG/DL (ref 70–105)
GLUCOSE SERPL-MCNC: 203 MG/DL (ref 74–106)
GLUCOSE SERPL-MCNC: 244 MG/DL (ref 70–105)
GLUCOSE SERPL-MCNC: 283 MG/DL (ref 70–105)
GLUCOSE SERPL-MCNC: 309 MG/DL (ref 70–105)
HCT VFR BLD AUTO: 40.9 % (ref 38–47)
HGB BLD-MCNC: 13.3 G/DL (ref 12–16)
IMM GRANULOCYTES # BLD AUTO: 0.03 K/UL (ref 0–0.04)
IMM GRANULOCYTES NFR BLD: 0.3 % (ref 0–0.4)
LYMPHOCYTES # BLD AUTO: 1.36 K/UL (ref 1–4.8)
LYMPHOCYTES NFR BLD AUTO: 14.2 % (ref 27–41)
MCH RBC QN AUTO: 24.6 PG (ref 27–31)
MCHC RBC AUTO-ENTMCNC: 32.5 G/DL (ref 32–36)
MCV RBC AUTO: 75.7 FL (ref 80–96)
MONOCYTES # BLD AUTO: 0.4 K/UL (ref 0–0.8)
MONOCYTES NFR BLD AUTO: 4.2 % (ref 2–6)
MPC BLD CALC-MCNC: 10.8 FL (ref 9.4–12.4)
NEUTROPHILS # BLD AUTO: 7.74 K/UL (ref 1.8–7.7)
NEUTROPHILS NFR BLD AUTO: 81 % (ref 53–65)
NRBC # BLD AUTO: 0.03 X10E3/UL
NRBC, AUTO (.00): 0.3 %
PLATELET # BLD AUTO: 290 K/UL (ref 150–400)
POTASSIUM SERPL-SCNC: 4.2 MMOL/L (ref 3.5–5.1)
RBC # BLD AUTO: 5.4 M/UL (ref 4.2–5.4)
SODIUM SERPL-SCNC: 142 MMOL/L (ref 136–145)
TROPONIN I SERPL-MCNC: 0.06 NG/ML
WBC # BLD AUTO: 9.56 K/UL (ref 4.5–11)

## 2021-08-06 PROCEDURE — 63600175 PHARM REV CODE 636 W HCPCS: Performed by: REGISTERED NURSE

## 2021-08-06 PROCEDURE — 11000001 HC ACUTE MED/SURG PRIVATE ROOM

## 2021-08-06 PROCEDURE — 94640 AIRWAY INHALATION TREATMENT: CPT

## 2021-08-06 PROCEDURE — 99900035 HC TECH TIME PER 15 MIN (STAT)

## 2021-08-06 PROCEDURE — 99223 1ST HOSP IP/OBS HIGH 75: CPT | Mod: ,,, | Performed by: FAMILY MEDICINE

## 2021-08-06 PROCEDURE — 63600175 PHARM REV CODE 636 W HCPCS: Performed by: INTERNAL MEDICINE

## 2021-08-06 PROCEDURE — 25000003 PHARM REV CODE 250: Performed by: HOSPITALIST

## 2021-08-06 PROCEDURE — 25000003 PHARM REV CODE 250: Performed by: REGISTERED NURSE

## 2021-08-06 PROCEDURE — 25000003 PHARM REV CODE 250: Performed by: INTERNAL MEDICINE

## 2021-08-06 PROCEDURE — 82962 GLUCOSE BLOOD TEST: CPT

## 2021-08-06 PROCEDURE — 94761 N-INVAS EAR/PLS OXIMETRY MLT: CPT

## 2021-08-06 PROCEDURE — 63600175 PHARM REV CODE 636 W HCPCS: Performed by: FAMILY MEDICINE

## 2021-08-06 PROCEDURE — 84484 ASSAY OF TROPONIN QUANT: CPT | Performed by: HOSPITALIST

## 2021-08-06 PROCEDURE — 27000221 HC OXYGEN, UP TO 24 HOURS

## 2021-08-06 PROCEDURE — 85025 COMPLETE CBC W/AUTO DIFF WBC: CPT | Performed by: HOSPITALIST

## 2021-08-06 PROCEDURE — 36415 COLL VENOUS BLD VENIPUNCTURE: CPT | Performed by: HOSPITALIST

## 2021-08-06 PROCEDURE — 25000003 PHARM REV CODE 250: Performed by: FAMILY MEDICINE

## 2021-08-06 PROCEDURE — 99223 PR INITIAL HOSPITAL CARE,LEVL III: ICD-10-PCS | Mod: ,,, | Performed by: FAMILY MEDICINE

## 2021-08-06 PROCEDURE — 80048 BASIC METABOLIC PNL TOTAL CA: CPT | Performed by: HOSPITALIST

## 2021-08-06 RX ORDER — TALC
6 POWDER (GRAM) TOPICAL NIGHTLY PRN
Status: DISCONTINUED | OUTPATIENT
Start: 2021-08-06 | End: 2021-08-10 | Stop reason: HOSPADM

## 2021-08-06 RX ORDER — LOSARTAN POTASSIUM 25 MG/1
100 TABLET ORAL DAILY
Status: DISCONTINUED | OUTPATIENT
Start: 2021-08-07 | End: 2021-08-10 | Stop reason: HOSPADM

## 2021-08-06 RX ORDER — PANTOPRAZOLE SODIUM 40 MG/1
40 TABLET, DELAYED RELEASE ORAL DAILY
Status: DISCONTINUED | OUTPATIENT
Start: 2021-08-07 | End: 2021-08-10 | Stop reason: HOSPADM

## 2021-08-06 RX ORDER — DULOXETIN HYDROCHLORIDE 30 MG/1
30 CAPSULE, DELAYED RELEASE ORAL DAILY
Status: DISCONTINUED | OUTPATIENT
Start: 2021-08-07 | End: 2021-08-10 | Stop reason: HOSPADM

## 2021-08-06 RX ORDER — ATORVASTATIN CALCIUM 40 MG/1
40 TABLET, FILM COATED ORAL DAILY
Status: DISCONTINUED | OUTPATIENT
Start: 2021-08-07 | End: 2021-08-10 | Stop reason: HOSPADM

## 2021-08-06 RX ORDER — SODIUM CHLORIDE 0.9 % (FLUSH) 0.9 %
10 SYRINGE (ML) INJECTION
Status: DISCONTINUED | OUTPATIENT
Start: 2021-08-06 | End: 2021-08-10 | Stop reason: HOSPADM

## 2021-08-06 RX ORDER — DULOXETIN HYDROCHLORIDE 30 MG/1
30 CAPSULE, DELAYED RELEASE ORAL DAILY
Status: CANCELLED | OUTPATIENT
Start: 2021-08-07

## 2021-08-06 RX ORDER — METOPROLOL TARTRATE 25 MG/1
12.5 TABLET ORAL 3 TIMES DAILY
Status: DISCONTINUED | OUTPATIENT
Start: 2021-08-06 | End: 2021-08-09

## 2021-08-06 RX ORDER — FUROSEMIDE 10 MG/ML
40 INJECTION INTRAMUSCULAR; INTRAVENOUS EVERY 8 HOURS
Status: DISCONTINUED | OUTPATIENT
Start: 2021-08-06 | End: 2021-08-10

## 2021-08-06 RX ORDER — ASCORBIC ACID 500 MG
500 TABLET ORAL DAILY
Status: DISCONTINUED | OUTPATIENT
Start: 2021-08-07 | End: 2021-08-10 | Stop reason: HOSPADM

## 2021-08-06 RX ORDER — TALC
6 POWDER (GRAM) TOPICAL NIGHTLY PRN
Status: DISCONTINUED | OUTPATIENT
Start: 2021-08-06 | End: 2021-08-06

## 2021-08-06 RX ORDER — LANOLIN ALCOHOL/MO/W.PET/CERES
800 CREAM (GRAM) TOPICAL
Status: CANCELLED | OUTPATIENT
Start: 2021-08-06

## 2021-08-06 RX ORDER — ACETAMINOPHEN 325 MG/1
650 TABLET ORAL EVERY 4 HOURS PRN
Status: DISCONTINUED | OUTPATIENT
Start: 2021-08-06 | End: 2021-08-10 | Stop reason: HOSPADM

## 2021-08-06 RX ORDER — CHOLECALCIFEROL (VITAMIN D3) 25 MCG
1000 TABLET ORAL DAILY
Status: CANCELLED | OUTPATIENT
Start: 2021-08-07

## 2021-08-06 RX ORDER — ALBUTEROL SULFATE 90 UG/1
2 AEROSOL, METERED RESPIRATORY (INHALATION) EVERY 8 HOURS
Status: CANCELLED | OUTPATIENT
Start: 2021-08-07

## 2021-08-06 RX ORDER — HYDRALAZINE HYDROCHLORIDE 25 MG/1
25 TABLET, FILM COATED ORAL 3 TIMES DAILY
Status: DISCONTINUED | OUTPATIENT
Start: 2021-08-06 | End: 2021-08-10 | Stop reason: HOSPADM

## 2021-08-06 RX ORDER — ASCORBIC ACID 500 MG
500 TABLET ORAL DAILY
Status: CANCELLED | OUTPATIENT
Start: 2021-08-07

## 2021-08-06 RX ORDER — GLUCAGON 1 MG
1 KIT INJECTION
Status: DISCONTINUED | OUTPATIENT
Start: 2021-08-06 | End: 2021-08-10 | Stop reason: HOSPADM

## 2021-08-06 RX ORDER — DEXAMETHASONE 4 MG/1
4 TABLET ORAL EVERY 12 HOURS
Status: DISCONTINUED | OUTPATIENT
Start: 2021-08-06 | End: 2021-08-10 | Stop reason: HOSPADM

## 2021-08-06 RX ORDER — AMLODIPINE BESYLATE 5 MG/1
5 TABLET ORAL DAILY
Status: DISCONTINUED | OUTPATIENT
Start: 2021-08-07 | End: 2021-08-10 | Stop reason: HOSPADM

## 2021-08-06 RX ORDER — INSULIN ASPART 100 [IU]/ML
0-5 INJECTION, SOLUTION INTRAVENOUS; SUBCUTANEOUS
Status: CANCELLED | OUTPATIENT
Start: 2021-08-06

## 2021-08-06 RX ORDER — HYDRALAZINE HYDROCHLORIDE 25 MG/1
25 TABLET, FILM COATED ORAL 3 TIMES DAILY
Status: CANCELLED | OUTPATIENT
Start: 2021-08-06

## 2021-08-06 RX ORDER — INSULIN ASPART 100 [IU]/ML
0-5 INJECTION, SOLUTION INTRAVENOUS; SUBCUTANEOUS
Status: DISCONTINUED | OUTPATIENT
Start: 2021-08-06 | End: 2021-08-09

## 2021-08-06 RX ORDER — LANOLIN ALCOHOL/MO/W.PET/CERES
800 CREAM (GRAM) TOPICAL
Status: DISCONTINUED | OUTPATIENT
Start: 2021-08-06 | End: 2021-08-10 | Stop reason: HOSPADM

## 2021-08-06 RX ORDER — SODIUM CHLORIDE 0.9 % (FLUSH) 0.9 %
10 SYRINGE (ML) INJECTION
Status: CANCELLED | OUTPATIENT
Start: 2021-08-06

## 2021-08-06 RX ORDER — ALBUTEROL SULFATE 90 UG/1
2 AEROSOL, METERED RESPIRATORY (INHALATION) EVERY 8 HOURS
Status: DISCONTINUED | OUTPATIENT
Start: 2021-08-07 | End: 2021-08-10 | Stop reason: HOSPADM

## 2021-08-06 RX ORDER — ATORVASTATIN CALCIUM 40 MG/1
40 TABLET, FILM COATED ORAL DAILY
Status: CANCELLED | OUTPATIENT
Start: 2021-08-07

## 2021-08-06 RX ORDER — GLUCAGON 1 MG
1 KIT INJECTION
Status: CANCELLED | OUTPATIENT
Start: 2021-08-06

## 2021-08-06 RX ORDER — ALBUTEROL SULFATE 90 UG/1
1 AEROSOL, METERED RESPIRATORY (INHALATION) EVERY 4 HOURS PRN
Status: DISCONTINUED | OUTPATIENT
Start: 2021-08-06 | End: 2021-08-10 | Stop reason: HOSPADM

## 2021-08-06 RX ORDER — METOPROLOL TARTRATE 25 MG/1
12.5 TABLET ORAL 3 TIMES DAILY
Status: CANCELLED | OUTPATIENT
Start: 2021-08-06

## 2021-08-06 RX ORDER — LOSARTAN POTASSIUM 100 MG/1
100 TABLET ORAL DAILY
Status: CANCELLED | OUTPATIENT
Start: 2021-08-07

## 2021-08-06 RX ORDER — ALBUTEROL SULFATE 90 UG/1
1 AEROSOL, METERED RESPIRATORY (INHALATION) EVERY 4 HOURS PRN
Status: CANCELLED | OUTPATIENT
Start: 2021-08-06

## 2021-08-06 RX ORDER — POTASSIUM CHLORIDE 750 MG/1
10 TABLET, EXTENDED RELEASE ORAL 2 TIMES DAILY
Status: DISCONTINUED | OUTPATIENT
Start: 2021-08-06 | End: 2021-08-09

## 2021-08-06 RX ORDER — ONDANSETRON 2 MG/ML
4 INJECTION INTRAMUSCULAR; INTRAVENOUS EVERY 8 HOURS PRN
Status: DISCONTINUED | OUTPATIENT
Start: 2021-08-06 | End: 2021-08-10 | Stop reason: HOSPADM

## 2021-08-06 RX ORDER — PANTOPRAZOLE SODIUM 40 MG/1
40 TABLET, DELAYED RELEASE ORAL DAILY
Status: CANCELLED | OUTPATIENT
Start: 2021-08-07

## 2021-08-06 RX ORDER — FUROSEMIDE 10 MG/ML
40 INJECTION INTRAMUSCULAR; INTRAVENOUS EVERY 8 HOURS
Status: CANCELLED | OUTPATIENT
Start: 2021-08-06

## 2021-08-06 RX ORDER — AMLODIPINE BESYLATE 5 MG/1
5 TABLET ORAL DAILY
Status: CANCELLED | OUTPATIENT
Start: 2021-08-07

## 2021-08-06 RX ORDER — ACETAMINOPHEN 325 MG/1
650 TABLET ORAL EVERY 4 HOURS PRN
Status: CANCELLED | OUTPATIENT
Start: 2021-08-06

## 2021-08-06 RX ORDER — DEXAMETHASONE 4 MG/1
4 TABLET ORAL EVERY 12 HOURS
Status: CANCELLED | OUTPATIENT
Start: 2021-08-06

## 2021-08-06 RX ORDER — ONDANSETRON 2 MG/ML
4 INJECTION INTRAMUSCULAR; INTRAVENOUS EVERY 8 HOURS PRN
Status: CANCELLED | OUTPATIENT
Start: 2021-08-06

## 2021-08-06 RX ORDER — POTASSIUM CHLORIDE 750 MG/1
10 TABLET, EXTENDED RELEASE ORAL 2 TIMES DAILY
Status: CANCELLED | OUTPATIENT
Start: 2021-08-06

## 2021-08-06 RX ORDER — TALC
6 POWDER (GRAM) TOPICAL NIGHTLY PRN
Status: CANCELLED | OUTPATIENT
Start: 2021-08-06

## 2021-08-06 RX ORDER — SODIUM CHLORIDE 0.9 % (FLUSH) 0.9 %
10 SYRINGE (ML) INJECTION EVERY 6 HOURS
Status: DISCONTINUED | OUTPATIENT
Start: 2021-08-07 | End: 2021-08-10 | Stop reason: HOSPADM

## 2021-08-06 RX ORDER — CHOLECALCIFEROL (VITAMIN D3) 25 MCG
1000 TABLET ORAL DAILY
Status: DISCONTINUED | OUTPATIENT
Start: 2021-08-07 | End: 2021-08-10 | Stop reason: HOSPADM

## 2021-08-06 RX ADMIN — ALBUTEROL SULFATE 2 PUFF: 90 AEROSOL, METERED RESPIRATORY (INHALATION) at 04:08

## 2021-08-06 RX ADMIN — REMDESIVIR 200 MG: 100 INJECTION, POWDER, LYOPHILIZED, FOR SOLUTION INTRAVENOUS at 12:08

## 2021-08-06 RX ADMIN — PANTOPRAZOLE SODIUM 40 MG: 40 TABLET, DELAYED RELEASE ORAL at 09:08

## 2021-08-06 RX ADMIN — ATORVASTATIN CALCIUM 40 MG: 40 TABLET, FILM COATED ORAL at 09:08

## 2021-08-06 RX ADMIN — LOSARTAN POTASSIUM 100 MG: 100 TABLET, FILM COATED ORAL at 09:08

## 2021-08-06 RX ADMIN — Medication 1000 UNITS: at 09:08

## 2021-08-06 RX ADMIN — FUROSEMIDE 40 MG: 10 INJECTION, SOLUTION INTRAVENOUS at 05:08

## 2021-08-06 RX ADMIN — HYDRALAZINE HYDROCHLORIDE 25 MG: 25 TABLET ORAL at 09:08

## 2021-08-06 RX ADMIN — FUROSEMIDE 40 MG: 10 INJECTION, SOLUTION INTRAVENOUS at 02:08

## 2021-08-06 RX ADMIN — METOPROLOL TARTRATE 12.5 MG: 25 TABLET, FILM COATED ORAL at 09:08

## 2021-08-06 RX ADMIN — HYDRALAZINE HYDROCHLORIDE 25 MG: 25 TABLET, FILM COATED ORAL at 10:08

## 2021-08-06 RX ADMIN — DULOXETINE 30 MG: 30 CAPSULE, DELAYED RELEASE ORAL at 09:08

## 2021-08-06 RX ADMIN — HUMAN INSULIN 10 UNITS: 100 INJECTION, SUSPENSION SUBCUTANEOUS at 10:08

## 2021-08-06 RX ADMIN — OXYCODONE HYDROCHLORIDE AND ACETAMINOPHEN 500 MG: 500 TABLET ORAL at 09:08

## 2021-08-06 RX ADMIN — POTASSIUM CHLORIDE 10 MEQ: 750 TABLET, FILM COATED, EXTENDED RELEASE ORAL at 10:08

## 2021-08-06 RX ADMIN — POTASSIUM CHLORIDE 10 MEQ: 750 TABLET, FILM COATED, EXTENDED RELEASE ORAL at 09:08

## 2021-08-06 RX ADMIN — AMLODIPINE BESYLATE 5 MG: 5 TABLET ORAL at 09:08

## 2021-08-06 RX ADMIN — ALBUTEROL SULFATE 2 PUFF: 90 AEROSOL, METERED RESPIRATORY (INHALATION) at 08:08

## 2021-08-06 RX ADMIN — ZINC SULFATE 220 MG (50 MG) CAPSULE 220 MG: CAPSULE at 09:08

## 2021-08-06 RX ADMIN — METOPROLOL TARTRATE 12.5 MG: 25 TABLET, FILM COATED ORAL at 10:08

## 2021-08-06 RX ADMIN — HYDRALAZINE HYDROCHLORIDE 25 MG: 25 TABLET ORAL at 02:08

## 2021-08-06 RX ADMIN — DEXAMETHASONE 4 MG: 4 TABLET ORAL at 09:08

## 2021-08-06 RX ADMIN — DEXAMETHASONE 4 MG: 4 TABLET ORAL at 10:08

## 2021-08-06 RX ADMIN — FUROSEMIDE 40 MG: 10 INJECTION INTRAMUSCULAR; INTRAVENOUS at 10:08

## 2021-08-06 RX ADMIN — APIXABAN 5 MG: 5 TABLET, FILM COATED ORAL at 09:08

## 2021-08-06 RX ADMIN — METOPROLOL TARTRATE 12.5 MG: 25 TABLET, FILM COATED ORAL at 02:08

## 2021-08-06 RX ADMIN — APIXABAN 5 MG: 5 TABLET, FILM COATED ORAL at 10:08

## 2021-08-06 RX ADMIN — INSULIN ASPART 2 UNITS: 100 INJECTION, SOLUTION INTRAVENOUS; SUBCUTANEOUS at 05:08

## 2021-08-07 LAB
ALBUMIN SERPL BCP-MCNC: 3 G/DL (ref 3.5–5)
ALBUMIN/GLOB SERPL: 0.6 {RATIO}
ALP SERPL-CCNC: 101 U/L (ref 55–142)
ALT SERPL W P-5'-P-CCNC: 25 U/L (ref 13–56)
ANION GAP SERPL CALCULATED.3IONS-SCNC: 8 MMOL/L (ref 7–16)
AST SERPL W P-5'-P-CCNC: 19 U/L (ref 15–37)
BILIRUB SERPL-MCNC: 0.4 MG/DL (ref 0–1.2)
BUN SERPL-MCNC: 30 MG/DL (ref 7–18)
BUN/CREAT SERPL: 25 (ref 6–20)
CALCIUM SERPL-MCNC: 9.1 MG/DL (ref 8.5–10.1)
CHLORIDE SERPL-SCNC: 97 MMOL/L (ref 98–107)
CO2 SERPL-SCNC: 37 MMOL/L (ref 21–32)
CREAT SERPL-MCNC: 1.18 MG/DL (ref 0.55–1.02)
GLOBULIN SER-MCNC: 4.7 G/DL (ref 2–4)
GLUCOSE SERPL-MCNC: 215 MG/DL (ref 70–105)
GLUCOSE SERPL-MCNC: 250 MG/DL (ref 74–106)
GLUCOSE SERPL-MCNC: 265 MG/DL (ref 70–105)
GLUCOSE SERPL-MCNC: 312 MG/DL (ref 70–105)
POTASSIUM SERPL-SCNC: 4.3 MMOL/L (ref 3.5–5.1)
PROT SERPL-MCNC: 7.7 G/DL (ref 6.4–8.2)
SODIUM SERPL-SCNC: 138 MMOL/L (ref 136–145)

## 2021-08-07 PROCEDURE — 25000242 PHARM REV CODE 250 ALT 637 W/ HCPCS: Performed by: FAMILY MEDICINE

## 2021-08-07 PROCEDURE — 63600175 PHARM REV CODE 636 W HCPCS: Performed by: FAMILY MEDICINE

## 2021-08-07 PROCEDURE — 94640 AIRWAY INHALATION TREATMENT: CPT

## 2021-08-07 PROCEDURE — 11000001 HC ACUTE MED/SURG PRIVATE ROOM

## 2021-08-07 PROCEDURE — 25000003 PHARM REV CODE 250: Performed by: FAMILY MEDICINE

## 2021-08-07 PROCEDURE — 36415 COLL VENOUS BLD VENIPUNCTURE: CPT | Performed by: FAMILY MEDICINE

## 2021-08-07 PROCEDURE — 94761 N-INVAS EAR/PLS OXIMETRY MLT: CPT

## 2021-08-07 PROCEDURE — 27000221 HC OXYGEN, UP TO 24 HOURS

## 2021-08-07 PROCEDURE — 80053 COMPREHEN METABOLIC PANEL: CPT | Performed by: FAMILY MEDICINE

## 2021-08-07 PROCEDURE — 27000947

## 2021-08-07 PROCEDURE — 99900035 HC TECH TIME PER 15 MIN (STAT)

## 2021-08-07 PROCEDURE — A4216 STERILE WATER/SALINE, 10 ML: HCPCS | Performed by: FAMILY MEDICINE

## 2021-08-07 PROCEDURE — 82962 GLUCOSE BLOOD TEST: CPT

## 2021-08-07 RX ADMIN — SODIUM CHLORIDE, PRESERVATIVE FREE 10 ML: 5 INJECTION INTRAVENOUS at 11:08

## 2021-08-07 RX ADMIN — APIXABAN 5 MG: 5 TABLET, FILM COATED ORAL at 09:08

## 2021-08-07 RX ADMIN — SODIUM CHLORIDE, PRESERVATIVE FREE 10 ML: 5 INJECTION INTRAVENOUS at 12:08

## 2021-08-07 RX ADMIN — FUROSEMIDE 40 MG: 10 INJECTION INTRAMUSCULAR; INTRAVENOUS at 09:08

## 2021-08-07 RX ADMIN — ALBUTEROL SULFATE 2 PUFF: 90 AEROSOL, METERED RESPIRATORY (INHALATION) at 08:08

## 2021-08-07 RX ADMIN — POTASSIUM CHLORIDE 10 MEQ: 750 TABLET, FILM COATED, EXTENDED RELEASE ORAL at 09:08

## 2021-08-07 RX ADMIN — REMDESIVIR 100 MG: 100 INJECTION, POWDER, LYOPHILIZED, FOR SOLUTION INTRAVENOUS at 11:08

## 2021-08-07 RX ADMIN — ALBUTEROL SULFATE 2 PUFF: 90 AEROSOL, METERED RESPIRATORY (INHALATION) at 04:08

## 2021-08-07 RX ADMIN — HUMAN INSULIN 10 UNITS: 100 INJECTION, SUSPENSION SUBCUTANEOUS at 09:08

## 2021-08-07 RX ADMIN — DULOXETINE HYDROCHLORIDE 30 MG: 30 CAPSULE, DELAYED RELEASE ORAL at 10:08

## 2021-08-07 RX ADMIN — FUROSEMIDE 40 MG: 10 INJECTION INTRAMUSCULAR; INTRAVENOUS at 01:08

## 2021-08-07 RX ADMIN — PANTOPRAZOLE SODIUM 40 MG: 40 TABLET, DELAYED RELEASE ORAL at 10:08

## 2021-08-07 RX ADMIN — AMLODIPINE BESYLATE 5 MG: 5 TABLET ORAL at 10:08

## 2021-08-07 RX ADMIN — APIXABAN 5 MG: 5 TABLET, FILM COATED ORAL at 10:08

## 2021-08-07 RX ADMIN — DEXAMETHASONE 4 MG: 4 TABLET ORAL at 09:08

## 2021-08-07 RX ADMIN — Medication 1000 UNITS: at 10:08

## 2021-08-07 RX ADMIN — METOPROLOL TARTRATE 12.5 MG: 25 TABLET, FILM COATED ORAL at 09:08

## 2021-08-07 RX ADMIN — OXYCODONE HYDROCHLORIDE AND ACETAMINOPHEN 500 MG: 500 TABLET ORAL at 10:08

## 2021-08-07 RX ADMIN — SODIUM CHLORIDE, PRESERVATIVE FREE 10 ML: 5 INJECTION INTRAVENOUS at 05:08

## 2021-08-07 RX ADMIN — HYDRALAZINE HYDROCHLORIDE 25 MG: 25 TABLET, FILM COATED ORAL at 09:08

## 2021-08-07 RX ADMIN — FUROSEMIDE 40 MG: 10 INJECTION INTRAMUSCULAR; INTRAVENOUS at 06:08

## 2021-08-07 RX ADMIN — INSULIN ASPART 3 UNITS: 100 INJECTION, SOLUTION INTRAVENOUS; SUBCUTANEOUS at 12:08

## 2021-08-07 RX ADMIN — HYDRALAZINE HYDROCHLORIDE 25 MG: 25 TABLET, FILM COATED ORAL at 10:08

## 2021-08-07 RX ADMIN — DEXAMETHASONE 4 MG: 4 TABLET ORAL at 10:08

## 2021-08-07 RX ADMIN — INSULIN ASPART 2 UNITS: 100 INJECTION, SOLUTION INTRAVENOUS; SUBCUTANEOUS at 09:08

## 2021-08-07 RX ADMIN — ALBUTEROL SULFATE 2 PUFF: 90 AEROSOL, METERED RESPIRATORY (INHALATION) at 12:08

## 2021-08-07 RX ADMIN — HUMAN INSULIN 10 UNITS: 100 INJECTION, SUSPENSION SUBCUTANEOUS at 10:08

## 2021-08-07 RX ADMIN — INSULIN ASPART 2 UNITS: 100 INJECTION, SOLUTION INTRAVENOUS; SUBCUTANEOUS at 05:08

## 2021-08-07 RX ADMIN — ACETAMINOPHEN 650 MG: 325 TABLET ORAL at 09:08

## 2021-08-07 RX ADMIN — HYDRALAZINE HYDROCHLORIDE 25 MG: 25 TABLET, FILM COATED ORAL at 02:08

## 2021-08-07 RX ADMIN — ATORVASTATIN CALCIUM 40 MG: 40 TABLET, FILM COATED ORAL at 10:08

## 2021-08-07 RX ADMIN — LOSARTAN POTASSIUM 100 MG: 25 TABLET, FILM COATED ORAL at 10:08

## 2021-08-08 LAB
ALBUMIN SERPL BCP-MCNC: 2.8 G/DL (ref 3.5–5)
ALBUMIN/GLOB SERPL: 0.6 {RATIO}
ALP SERPL-CCNC: 87 U/L (ref 55–142)
ALT SERPL W P-5'-P-CCNC: 23 U/L (ref 13–56)
ANION GAP SERPL CALCULATED.3IONS-SCNC: 6 MMOL/L (ref 7–16)
AST SERPL W P-5'-P-CCNC: 15 U/L (ref 15–37)
BILIRUB SERPL-MCNC: 0.3 MG/DL (ref 0–1.2)
BUN SERPL-MCNC: 36 MG/DL (ref 7–18)
BUN/CREAT SERPL: 29 (ref 6–20)
CALCIUM SERPL-MCNC: 8.7 MG/DL (ref 8.5–10.1)
CHLORIDE SERPL-SCNC: 97 MMOL/L (ref 98–107)
CO2 SERPL-SCNC: 38 MMOL/L (ref 21–32)
CREAT SERPL-MCNC: 1.25 MG/DL (ref 0.55–1.02)
D DIMER PPP FEU-MCNC: 1.17 ΜG/ML (ref 0–0.47)
GLOBULIN SER-MCNC: 4.4 G/DL (ref 2–4)
GLUCOSE SERPL-MCNC: 223 MG/DL (ref 70–105)
GLUCOSE SERPL-MCNC: 252 MG/DL (ref 70–105)
GLUCOSE SERPL-MCNC: 301 MG/DL (ref 74–106)
POTASSIUM SERPL-SCNC: 4.1 MMOL/L (ref 3.5–5.1)
PROT SERPL-MCNC: 7.2 G/DL (ref 6.4–8.2)
SODIUM SERPL-SCNC: 137 MMOL/L (ref 136–145)

## 2021-08-08 PROCEDURE — 80053 COMPREHEN METABOLIC PANEL: CPT | Performed by: FAMILY MEDICINE

## 2021-08-08 PROCEDURE — 85378 FIBRIN DEGRADE SEMIQUANT: CPT | Performed by: INTERNAL MEDICINE

## 2021-08-08 PROCEDURE — 25000003 PHARM REV CODE 250: Performed by: FAMILY MEDICINE

## 2021-08-08 PROCEDURE — 63600175 PHARM REV CODE 636 W HCPCS: Performed by: FAMILY MEDICINE

## 2021-08-08 PROCEDURE — 27000221 HC OXYGEN, UP TO 24 HOURS

## 2021-08-08 PROCEDURE — A4216 STERILE WATER/SALINE, 10 ML: HCPCS | Performed by: FAMILY MEDICINE

## 2021-08-08 PROCEDURE — 82962 GLUCOSE BLOOD TEST: CPT

## 2021-08-08 PROCEDURE — 94761 N-INVAS EAR/PLS OXIMETRY MLT: CPT

## 2021-08-08 PROCEDURE — 11000001 HC ACUTE MED/SURG PRIVATE ROOM

## 2021-08-08 PROCEDURE — 94640 AIRWAY INHALATION TREATMENT: CPT

## 2021-08-08 PROCEDURE — 36415 COLL VENOUS BLD VENIPUNCTURE: CPT | Performed by: INTERNAL MEDICINE

## 2021-08-08 PROCEDURE — 27000947

## 2021-08-08 RX ADMIN — HYDRALAZINE HYDROCHLORIDE 25 MG: 25 TABLET, FILM COATED ORAL at 05:08

## 2021-08-08 RX ADMIN — APIXABAN 5 MG: 5 TABLET, FILM COATED ORAL at 09:08

## 2021-08-08 RX ADMIN — ALBUTEROL SULFATE 2 PUFF: 90 AEROSOL, METERED RESPIRATORY (INHALATION) at 04:08

## 2021-08-08 RX ADMIN — INSULIN ASPART 2 UNITS: 100 INJECTION, SOLUTION INTRAVENOUS; SUBCUTANEOUS at 12:08

## 2021-08-08 RX ADMIN — SODIUM CHLORIDE, PRESERVATIVE FREE 10 ML: 5 INJECTION INTRAVENOUS at 12:08

## 2021-08-08 RX ADMIN — DULOXETINE HYDROCHLORIDE 30 MG: 30 CAPSULE, DELAYED RELEASE ORAL at 09:08

## 2021-08-08 RX ADMIN — Medication 1000 UNITS: at 09:08

## 2021-08-08 RX ADMIN — HUMAN INSULIN 10 UNITS: 100 INJECTION, SUSPENSION SUBCUTANEOUS at 09:08

## 2021-08-08 RX ADMIN — ALBUTEROL SULFATE 2 PUFF: 90 AEROSOL, METERED RESPIRATORY (INHALATION) at 12:08

## 2021-08-08 RX ADMIN — ATORVASTATIN CALCIUM 40 MG: 40 TABLET, FILM COATED ORAL at 09:08

## 2021-08-08 RX ADMIN — POTASSIUM CHLORIDE 10 MEQ: 750 TABLET, FILM COATED, EXTENDED RELEASE ORAL at 11:08

## 2021-08-08 RX ADMIN — DEXAMETHASONE 4 MG: 4 TABLET ORAL at 11:08

## 2021-08-08 RX ADMIN — FUROSEMIDE 40 MG: 10 INJECTION INTRAMUSCULAR; INTRAVENOUS at 05:08

## 2021-08-08 RX ADMIN — FUROSEMIDE 40 MG: 10 INJECTION INTRAMUSCULAR; INTRAVENOUS at 11:08

## 2021-08-08 RX ADMIN — OXYCODONE HYDROCHLORIDE AND ACETAMINOPHEN 500 MG: 500 TABLET ORAL at 09:08

## 2021-08-08 RX ADMIN — METOPROLOL TARTRATE 12.5 MG: 25 TABLET, FILM COATED ORAL at 11:08

## 2021-08-08 RX ADMIN — HYDRALAZINE HYDROCHLORIDE 25 MG: 25 TABLET, FILM COATED ORAL at 09:08

## 2021-08-08 RX ADMIN — LOSARTAN POTASSIUM 100 MG: 25 TABLET, FILM COATED ORAL at 09:08

## 2021-08-08 RX ADMIN — AMLODIPINE BESYLATE 5 MG: 5 TABLET ORAL at 09:08

## 2021-08-08 RX ADMIN — INSULIN ASPART 2 UNITS: 100 INJECTION, SOLUTION INTRAVENOUS; SUBCUTANEOUS at 11:08

## 2021-08-08 RX ADMIN — SODIUM CHLORIDE, PRESERVATIVE FREE 10 ML: 5 INJECTION INTRAVENOUS at 06:08

## 2021-08-08 RX ADMIN — POTASSIUM CHLORIDE 10 MEQ: 750 TABLET, FILM COATED, EXTENDED RELEASE ORAL at 09:08

## 2021-08-08 RX ADMIN — DEXAMETHASONE 4 MG: 4 TABLET ORAL at 09:08

## 2021-08-08 RX ADMIN — ALBUTEROL SULFATE 2 PUFF: 90 AEROSOL, METERED RESPIRATORY (INHALATION) at 09:08

## 2021-08-08 RX ADMIN — HYDRALAZINE HYDROCHLORIDE 25 MG: 25 TABLET, FILM COATED ORAL at 11:08

## 2021-08-08 RX ADMIN — REMDESIVIR 100 MG: 100 INJECTION, POWDER, LYOPHILIZED, FOR SOLUTION INTRAVENOUS at 09:08

## 2021-08-08 RX ADMIN — INSULIN ASPART 4 UNITS: 100 INJECTION, SOLUTION INTRAVENOUS; SUBCUTANEOUS at 09:08

## 2021-08-08 RX ADMIN — METOPROLOL TARTRATE 12.5 MG: 25 TABLET, FILM COATED ORAL at 09:08

## 2021-08-08 RX ADMIN — PANTOPRAZOLE SODIUM 40 MG: 40 TABLET, DELAYED RELEASE ORAL at 09:08

## 2021-08-08 RX ADMIN — INSULIN ASPART 3 UNITS: 100 INJECTION, SOLUTION INTRAVENOUS; SUBCUTANEOUS at 05:08

## 2021-08-08 RX ADMIN — METOPROLOL TARTRATE 12.5 MG: 25 TABLET, FILM COATED ORAL at 03:08

## 2021-08-08 RX ADMIN — APIXABAN 5 MG: 5 TABLET, FILM COATED ORAL at 11:08

## 2021-08-09 LAB
ALBUMIN SERPL BCP-MCNC: 2.7 G/DL (ref 3.5–5)
ALBUMIN/GLOB SERPL: 0.7 {RATIO}
ALP SERPL-CCNC: 83 U/L (ref 55–142)
ALT SERPL W P-5'-P-CCNC: 22 U/L (ref 13–56)
ANION GAP SERPL CALCULATED.3IONS-SCNC: 7 MMOL/L (ref 7–16)
AST SERPL W P-5'-P-CCNC: 18 U/L (ref 15–37)
BILIRUB SERPL-MCNC: 0.4 MG/DL (ref 0–1.2)
BUN SERPL-MCNC: 38 MG/DL (ref 7–18)
BUN/CREAT SERPL: 32 (ref 6–20)
CALCIUM SERPL-MCNC: 8.7 MG/DL (ref 8.5–10.1)
CHLORIDE SERPL-SCNC: 99 MMOL/L (ref 98–107)
CO2 SERPL-SCNC: 36 MMOL/L (ref 21–32)
CREAT SERPL-MCNC: 1.18 MG/DL (ref 0.55–1.02)
GLOBULIN SER-MCNC: 4 G/DL (ref 2–4)
GLUCOSE SERPL-MCNC: 256 MG/DL (ref 74–106)
GLUCOSE SERPL-MCNC: 315 MG/DL (ref 70–105)
GLUCOSE SERPL-MCNC: 329 MG/DL (ref 70–105)
GLUCOSE SERPL-MCNC: 348 MG/DL (ref 70–105)
POTASSIUM SERPL-SCNC: 4 MMOL/L (ref 3.5–5.1)
PROT SERPL-MCNC: 6.7 G/DL (ref 6.4–8.2)
SODIUM SERPL-SCNC: 138 MMOL/L (ref 136–145)

## 2021-08-09 PROCEDURE — 27000947

## 2021-08-09 PROCEDURE — 99232 PR SUBSEQUENT HOSPITAL CARE,LEVL II: ICD-10-PCS | Mod: ,,, | Performed by: FAMILY MEDICINE

## 2021-08-09 PROCEDURE — A4216 STERILE WATER/SALINE, 10 ML: HCPCS | Performed by: FAMILY MEDICINE

## 2021-08-09 PROCEDURE — 27000221 HC OXYGEN, UP TO 24 HOURS

## 2021-08-09 PROCEDURE — 11000001 HC ACUTE MED/SURG PRIVATE ROOM

## 2021-08-09 PROCEDURE — 63600175 PHARM REV CODE 636 W HCPCS: Performed by: NURSE PRACTITIONER

## 2021-08-09 PROCEDURE — 82962 GLUCOSE BLOOD TEST: CPT

## 2021-08-09 PROCEDURE — 94640 AIRWAY INHALATION TREATMENT: CPT

## 2021-08-09 PROCEDURE — 27201922

## 2021-08-09 PROCEDURE — 80053 COMPREHEN METABOLIC PANEL: CPT | Performed by: FAMILY MEDICINE

## 2021-08-09 PROCEDURE — 99900035 HC TECH TIME PER 15 MIN (STAT)

## 2021-08-09 PROCEDURE — 94761 N-INVAS EAR/PLS OXIMETRY MLT: CPT

## 2021-08-09 PROCEDURE — 63600175 PHARM REV CODE 636 W HCPCS: Performed by: FAMILY MEDICINE

## 2021-08-09 PROCEDURE — 36415 COLL VENOUS BLD VENIPUNCTURE: CPT | Performed by: FAMILY MEDICINE

## 2021-08-09 PROCEDURE — 25000003 PHARM REV CODE 250: Performed by: FAMILY MEDICINE

## 2021-08-09 PROCEDURE — 99232 SBSQ HOSP IP/OBS MODERATE 35: CPT | Mod: ,,, | Performed by: FAMILY MEDICINE

## 2021-08-09 RX ORDER — POTASSIUM CHLORIDE 750 MG/1
10 TABLET, EXTENDED RELEASE ORAL 2 TIMES DAILY
Status: DISCONTINUED | OUTPATIENT
Start: 2021-08-09 | End: 2021-08-10 | Stop reason: HOSPADM

## 2021-08-09 RX ORDER — INSULIN ASPART 100 [IU]/ML
1-10 INJECTION, SOLUTION INTRAVENOUS; SUBCUTANEOUS
Status: DISCONTINUED | OUTPATIENT
Start: 2021-08-09 | End: 2021-08-10 | Stop reason: HOSPADM

## 2021-08-09 RX ADMIN — PANTOPRAZOLE SODIUM 40 MG: 40 TABLET, DELAYED RELEASE ORAL at 09:08

## 2021-08-09 RX ADMIN — HUMAN INSULIN 10 UNITS: 100 INJECTION, SUSPENSION SUBCUTANEOUS at 09:08

## 2021-08-09 RX ADMIN — SODIUM CHLORIDE, PRESERVATIVE FREE 10 ML: 5 INJECTION INTRAVENOUS at 05:08

## 2021-08-09 RX ADMIN — DEXAMETHASONE 4 MG: 4 TABLET ORAL at 09:08

## 2021-08-09 RX ADMIN — SODIUM CHLORIDE, PRESERVATIVE FREE 10 ML: 5 INJECTION INTRAVENOUS at 12:08

## 2021-08-09 RX ADMIN — APIXABAN 5 MG: 5 TABLET, FILM COATED ORAL at 09:08

## 2021-08-09 RX ADMIN — ATORVASTATIN CALCIUM 40 MG: 40 TABLET, FILM COATED ORAL at 12:08

## 2021-08-09 RX ADMIN — REMDESIVIR 100 MG: 100 INJECTION, POWDER, LYOPHILIZED, FOR SOLUTION INTRAVENOUS at 12:08

## 2021-08-09 RX ADMIN — HYDRALAZINE HYDROCHLORIDE 25 MG: 25 TABLET, FILM COATED ORAL at 09:08

## 2021-08-09 RX ADMIN — FUROSEMIDE 40 MG: 10 INJECTION INTRAMUSCULAR; INTRAVENOUS at 09:08

## 2021-08-09 RX ADMIN — FUROSEMIDE 40 MG: 10 INJECTION INTRAMUSCULAR; INTRAVENOUS at 02:08

## 2021-08-09 RX ADMIN — ALBUTEROL SULFATE 2 PUFF: 90 AEROSOL, METERED RESPIRATORY (INHALATION) at 03:08

## 2021-08-09 RX ADMIN — HYDRALAZINE HYDROCHLORIDE 25 MG: 25 TABLET, FILM COATED ORAL at 02:08

## 2021-08-09 RX ADMIN — SODIUM CHLORIDE, PRESERVATIVE FREE 10 ML: 5 INJECTION INTRAVENOUS at 06:08

## 2021-08-09 RX ADMIN — METOPROLOL SUCCINATE 12.5 MG: 25 TABLET, EXTENDED RELEASE ORAL at 11:08

## 2021-08-09 RX ADMIN — INSULIN ASPART 4 UNITS: 100 INJECTION, SOLUTION INTRAVENOUS; SUBCUTANEOUS at 05:08

## 2021-08-09 RX ADMIN — Medication 1000 UNITS: at 09:08

## 2021-08-09 RX ADMIN — LOSARTAN POTASSIUM 100 MG: 25 TABLET, FILM COATED ORAL at 09:08

## 2021-08-09 RX ADMIN — ALBUTEROL SULFATE 2 PUFF: 90 AEROSOL, METERED RESPIRATORY (INHALATION) at 12:08

## 2021-08-09 RX ADMIN — DULOXETINE HYDROCHLORIDE 30 MG: 30 CAPSULE, DELAYED RELEASE ORAL at 09:08

## 2021-08-09 RX ADMIN — FUROSEMIDE 40 MG: 10 INJECTION INTRAMUSCULAR; INTRAVENOUS at 05:08

## 2021-08-09 RX ADMIN — OXYCODONE HYDROCHLORIDE AND ACETAMINOPHEN 500 MG: 500 TABLET ORAL at 09:08

## 2021-08-09 RX ADMIN — INSULIN ASPART 4 UNITS: 100 INJECTION, SOLUTION INTRAVENOUS; SUBCUTANEOUS at 01:08

## 2021-08-09 RX ADMIN — ALBUTEROL SULFATE 2 PUFF: 90 AEROSOL, METERED RESPIRATORY (INHALATION) at 07:08

## 2021-08-09 RX ADMIN — INSULIN ASPART 4 UNITS: 100 INJECTION, SOLUTION INTRAVENOUS; SUBCUTANEOUS at 09:08

## 2021-08-09 RX ADMIN — AMLODIPINE BESYLATE 5 MG: 5 TABLET ORAL at 09:08

## 2021-08-10 VITALS
RESPIRATION RATE: 18 BRPM | OXYGEN SATURATION: 98 % | WEIGHT: 207 LBS | DIASTOLIC BLOOD PRESSURE: 67 MMHG | HEART RATE: 82 BPM | HEIGHT: 67 IN | BODY MASS INDEX: 32.49 KG/M2 | TEMPERATURE: 99 F | SYSTOLIC BLOOD PRESSURE: 142 MMHG

## 2021-08-10 LAB
ALBUMIN SERPL BCP-MCNC: 2.8 G/DL (ref 3.5–5)
ALBUMIN/GLOB SERPL: 0.7 {RATIO}
ALP SERPL-CCNC: 86 U/L (ref 55–142)
ALT SERPL W P-5'-P-CCNC: 25 U/L (ref 13–56)
ANION GAP SERPL CALCULATED.3IONS-SCNC: 9 MMOL/L (ref 7–16)
AST SERPL W P-5'-P-CCNC: 15 U/L (ref 15–37)
BILIRUB SERPL-MCNC: 0.4 MG/DL (ref 0–1.2)
BUN SERPL-MCNC: 36 MG/DL (ref 7–18)
BUN/CREAT SERPL: 28 (ref 6–20)
CALCIUM SERPL-MCNC: 8.7 MG/DL (ref 8.5–10.1)
CHLORIDE SERPL-SCNC: 97 MMOL/L (ref 98–107)
CO2 SERPL-SCNC: 38 MMOL/L (ref 21–32)
CREAT SERPL-MCNC: 1.29 MG/DL (ref 0.55–1.02)
GLOBULIN SER-MCNC: 4.3 G/DL (ref 2–4)
GLUCOSE SERPL-MCNC: 299 MG/DL (ref 70–105)
GLUCOSE SERPL-MCNC: 330 MG/DL (ref 74–106)
GLUCOSE SERPL-MCNC: 386 MG/DL (ref 70–105)
POTASSIUM SERPL-SCNC: 4.7 MMOL/L (ref 3.5–5.1)
PROT SERPL-MCNC: 7.1 G/DL (ref 6.4–8.2)
SODIUM SERPL-SCNC: 139 MMOL/L (ref 136–145)

## 2021-08-10 PROCEDURE — 99239 HOSP IP/OBS DSCHRG MGMT >30: CPT | Mod: ,,, | Performed by: FAMILY MEDICINE

## 2021-08-10 PROCEDURE — 99239 PR HOSPITAL DISCHARGE DAY,>30 MIN: ICD-10-PCS | Mod: ,,, | Performed by: FAMILY MEDICINE

## 2021-08-10 PROCEDURE — A4216 STERILE WATER/SALINE, 10 ML: HCPCS | Performed by: FAMILY MEDICINE

## 2021-08-10 PROCEDURE — 27000221 HC OXYGEN, UP TO 24 HOURS

## 2021-08-10 PROCEDURE — 63600175 PHARM REV CODE 636 W HCPCS: Performed by: FAMILY MEDICINE

## 2021-08-10 PROCEDURE — 63600175 PHARM REV CODE 636 W HCPCS: Performed by: NURSE PRACTITIONER

## 2021-08-10 PROCEDURE — 80053 COMPREHEN METABOLIC PANEL: CPT | Performed by: FAMILY MEDICINE

## 2021-08-10 PROCEDURE — 82962 GLUCOSE BLOOD TEST: CPT

## 2021-08-10 PROCEDURE — 94761 N-INVAS EAR/PLS OXIMETRY MLT: CPT

## 2021-08-10 PROCEDURE — 99900035 HC TECH TIME PER 15 MIN (STAT)

## 2021-08-10 PROCEDURE — 94640 AIRWAY INHALATION TREATMENT: CPT

## 2021-08-10 PROCEDURE — 25000242 PHARM REV CODE 250 ALT 637 W/ HCPCS: Performed by: FAMILY MEDICINE

## 2021-08-10 PROCEDURE — 36415 COLL VENOUS BLD VENIPUNCTURE: CPT | Performed by: FAMILY MEDICINE

## 2021-08-10 PROCEDURE — 25000003 PHARM REV CODE 250: Performed by: FAMILY MEDICINE

## 2021-08-10 PROCEDURE — 27000982 HC MATTRESS, MATRIX LAL RENTAL

## 2021-08-10 RX ORDER — FUROSEMIDE 10 MG/ML
20 INJECTION INTRAMUSCULAR; INTRAVENOUS EVERY 8 HOURS
Status: DISCONTINUED | OUTPATIENT
Start: 2021-08-10 | End: 2021-08-10 | Stop reason: HOSPADM

## 2021-08-10 RX ADMIN — HUMAN INSULIN 10 UNITS: 100 INJECTION, SUSPENSION SUBCUTANEOUS at 10:08

## 2021-08-10 RX ADMIN — ALBUTEROL SULFATE 2 PUFF: 90 AEROSOL, METERED RESPIRATORY (INHALATION) at 12:08

## 2021-08-10 RX ADMIN — SODIUM CHLORIDE, PRESERVATIVE FREE 10 ML: 5 INJECTION INTRAVENOUS at 05:08

## 2021-08-10 RX ADMIN — Medication 1000 UNITS: at 08:08

## 2021-08-10 RX ADMIN — FUROSEMIDE 40 MG: 10 INJECTION INTRAMUSCULAR; INTRAVENOUS at 05:08

## 2021-08-10 RX ADMIN — DULOXETINE HYDROCHLORIDE 30 MG: 30 CAPSULE, DELAYED RELEASE ORAL at 08:08

## 2021-08-10 RX ADMIN — SODIUM CHLORIDE, PRESERVATIVE FREE 10 ML: 5 INJECTION INTRAVENOUS at 12:08

## 2021-08-10 RX ADMIN — DEXAMETHASONE 4 MG: 4 TABLET ORAL at 08:08

## 2021-08-10 RX ADMIN — ALBUTEROL SULFATE 1 PUFF: 90 AEROSOL, METERED RESPIRATORY (INHALATION) at 06:08

## 2021-08-10 RX ADMIN — REMDESIVIR 100 MG: 100 INJECTION, POWDER, LYOPHILIZED, FOR SOLUTION INTRAVENOUS at 12:08

## 2021-08-10 RX ADMIN — INSULIN ASPART 10 UNITS: 100 INJECTION, SOLUTION INTRAVENOUS; SUBCUTANEOUS at 12:08

## 2021-08-10 RX ADMIN — HYDRALAZINE HYDROCHLORIDE 25 MG: 25 TABLET, FILM COATED ORAL at 08:08

## 2021-08-10 RX ADMIN — APIXABAN 5 MG: 5 TABLET, FILM COATED ORAL at 08:08

## 2021-08-10 RX ADMIN — LOSARTAN POTASSIUM 100 MG: 25 TABLET, FILM COATED ORAL at 08:08

## 2021-08-10 RX ADMIN — METOPROLOL SUCCINATE 12.5 MG: 25 TABLET, EXTENDED RELEASE ORAL at 09:08

## 2021-08-10 RX ADMIN — ATORVASTATIN CALCIUM 40 MG: 40 TABLET, FILM COATED ORAL at 08:08

## 2021-08-10 RX ADMIN — HYDRALAZINE HYDROCHLORIDE 25 MG: 25 TABLET, FILM COATED ORAL at 02:08

## 2021-08-10 RX ADMIN — ALBUTEROL SULFATE 2 PUFF: 90 AEROSOL, METERED RESPIRATORY (INHALATION) at 08:08

## 2021-08-10 RX ADMIN — ALBUTEROL SULFATE 2 PUFF: 90 AEROSOL, METERED RESPIRATORY (INHALATION) at 03:08

## 2021-08-10 RX ADMIN — AMLODIPINE BESYLATE 5 MG: 5 TABLET ORAL at 08:08

## 2021-08-10 RX ADMIN — INSULIN ASPART 6 UNITS: 100 INJECTION, SOLUTION INTRAVENOUS; SUBCUTANEOUS at 06:08

## 2021-08-10 RX ADMIN — OXYCODONE HYDROCHLORIDE AND ACETAMINOPHEN 500 MG: 500 TABLET ORAL at 08:08

## 2021-08-10 RX ADMIN — PANTOPRAZOLE SODIUM 40 MG: 40 TABLET, DELAYED RELEASE ORAL at 08:08

## 2021-08-10 RX ADMIN — FUROSEMIDE 20 MG: 10 INJECTION INTRAMUSCULAR; INTRAVENOUS at 02:08

## 2021-08-11 ENCOUNTER — TELEPHONE (OUTPATIENT)
Dept: FAMILY MEDICINE | Facility: CLINIC | Age: 76
End: 2021-08-11

## 2021-08-11 RX ORDER — METHOTREXATE 2.5 MG/1
2.5 TABLET ORAL
COMMUNITY
End: 2023-09-12 | Stop reason: SDUPTHER

## 2021-08-11 RX ORDER — FOLIC ACID 1 MG/1
1 TABLET ORAL DAILY
COMMUNITY
End: 2023-09-12 | Stop reason: SDUPTHER

## 2021-08-24 ENCOUNTER — OFFICE VISIT (OUTPATIENT)
Dept: FAMILY MEDICINE | Facility: CLINIC | Age: 76
End: 2021-08-24
Payer: COMMERCIAL

## 2021-08-24 VITALS
OXYGEN SATURATION: 93 % | HEIGHT: 67 IN | WEIGHT: 201 LBS | HEART RATE: 84 BPM | SYSTOLIC BLOOD PRESSURE: 124 MMHG | RESPIRATION RATE: 22 BRPM | DIASTOLIC BLOOD PRESSURE: 63 MMHG | BODY MASS INDEX: 31.55 KG/M2

## 2021-08-24 DIAGNOSIS — I10 ESSENTIAL HYPERTENSION: ICD-10-CM

## 2021-08-24 DIAGNOSIS — L30.9 DERMATITIS: ICD-10-CM

## 2021-08-24 DIAGNOSIS — I50.9 CONGESTIVE HEART FAILURE, UNSPECIFIED HF CHRONICITY, UNSPECIFIED HEART FAILURE TYPE: ICD-10-CM

## 2021-08-24 DIAGNOSIS — I83.008 VENOUS STASIS ULCER OF OTHER PART OF LOWER LEG, UNSPECIFIED LATERALITY, UNSPECIFIED ULCER STAGE, UNSPECIFIED WHETHER VARICOSE VEINS PRESENT: Primary | ICD-10-CM

## 2021-08-24 DIAGNOSIS — L97.809 VENOUS STASIS ULCER OF OTHER PART OF LOWER LEG, UNSPECIFIED LATERALITY, UNSPECIFIED ULCER STAGE, UNSPECIFIED WHETHER VARICOSE VEINS PRESENT: Primary | ICD-10-CM

## 2021-08-24 PROCEDURE — 99495 TRANSJ CARE MGMT MOD F2F 14D: CPT | Mod: ,,, | Performed by: INTERNAL MEDICINE

## 2021-08-24 PROCEDURE — 99495 TCM SERVICES (MODERATE COMPLEXITY): ICD-10-PCS | Mod: ,,, | Performed by: INTERNAL MEDICINE

## 2021-08-26 RX ORDER — TRIAMCINOLONE ACETONIDE 1 MG/G
CREAM TOPICAL 2 TIMES DAILY
Qty: 454 G | Refills: 1 | Status: SHIPPED | OUTPATIENT
Start: 2021-08-26 | End: 2021-09-14 | Stop reason: SDUPTHER

## 2021-08-26 RX ORDER — AMLODIPINE BESYLATE 5 MG/1
5 TABLET ORAL DAILY
Qty: 30 TABLET | Refills: 0 | Status: SHIPPED | OUTPATIENT
Start: 2021-08-26 | End: 2021-09-14 | Stop reason: SDUPTHER

## 2021-08-26 RX ORDER — AMOXICILLIN AND CLAVULANATE POTASSIUM 500; 125 MG/1; MG/1
1 TABLET, FILM COATED ORAL 2 TIMES DAILY
Qty: 20 TABLET | Refills: 0 | Status: SHIPPED | OUTPATIENT
Start: 2021-08-26 | End: 2022-02-15 | Stop reason: DRUGHIGH

## 2021-08-26 RX ORDER — METOPROLOL TARTRATE 25 MG/1
25 TABLET, FILM COATED ORAL 3 TIMES DAILY
Qty: 90 TABLET | Refills: 3 | Status: SHIPPED | OUTPATIENT
Start: 2021-08-26 | End: 2022-05-04 | Stop reason: SDUPTHER

## 2021-08-26 RX ORDER — FUROSEMIDE 40 MG/1
TABLET ORAL
Qty: 90 TABLET | Refills: 1 | Status: SHIPPED | OUTPATIENT
Start: 2021-08-26 | End: 2021-09-14 | Stop reason: SDUPTHER

## 2021-08-26 RX ORDER — POTASSIUM CHLORIDE 750 MG/1
10 TABLET, EXTENDED RELEASE ORAL 3 TIMES DAILY
Qty: 90 TABLET | Refills: 3 | Status: SHIPPED | OUTPATIENT
Start: 2021-08-26 | End: 2021-12-29 | Stop reason: SDUPTHER

## 2021-09-14 ENCOUNTER — OFFICE VISIT (OUTPATIENT)
Dept: FAMILY MEDICINE | Facility: CLINIC | Age: 76
End: 2021-09-14
Payer: COMMERCIAL

## 2021-09-14 VITALS
BODY MASS INDEX: 30.73 KG/M2 | SYSTOLIC BLOOD PRESSURE: 164 MMHG | OXYGEN SATURATION: 95 % | WEIGHT: 195.81 LBS | DIASTOLIC BLOOD PRESSURE: 78 MMHG | HEIGHT: 67 IN | HEART RATE: 85 BPM | RESPIRATION RATE: 18 BRPM

## 2021-09-14 DIAGNOSIS — I83.008 VENOUS STASIS ULCER OF OTHER PART OF LOWER LEG, UNSPECIFIED LATERALITY, UNSPECIFIED ULCER STAGE, UNSPECIFIED WHETHER VARICOSE VEINS PRESENT: ICD-10-CM

## 2021-09-14 DIAGNOSIS — L97.809 VENOUS STASIS ULCER OF OTHER PART OF LOWER LEG, UNSPECIFIED LATERALITY, UNSPECIFIED ULCER STAGE, UNSPECIFIED WHETHER VARICOSE VEINS PRESENT: ICD-10-CM

## 2021-09-14 DIAGNOSIS — I50.9 CONGESTIVE HEART FAILURE, UNSPECIFIED HF CHRONICITY, UNSPECIFIED HEART FAILURE TYPE: Primary | ICD-10-CM

## 2021-09-14 DIAGNOSIS — K21.9 GASTROESOPHAGEAL REFLUX DISEASE, UNSPECIFIED WHETHER ESOPHAGITIS PRESENT: ICD-10-CM

## 2021-09-14 DIAGNOSIS — T14.8XXA MUSCLE STRAIN: ICD-10-CM

## 2021-09-14 DIAGNOSIS — I10 ESSENTIAL HYPERTENSION: ICD-10-CM

## 2021-09-14 DIAGNOSIS — L30.9 DERMATITIS: ICD-10-CM

## 2021-09-14 LAB
ANION GAP SERPL CALCULATED.3IONS-SCNC: 7 MMOL/L (ref 7–16)
BUN SERPL-MCNC: 26 MG/DL (ref 7–18)
BUN/CREAT SERPL: 22 (ref 6–20)
CALCIUM SERPL-MCNC: 9.6 MG/DL (ref 8.5–10.1)
CHLORIDE SERPL-SCNC: 106 MMOL/L (ref 98–107)
CO2 SERPL-SCNC: 33 MMOL/L (ref 21–32)
CREAT SERPL-MCNC: 1.18 MG/DL (ref 0.55–1.02)
GLUCOSE SERPL-MCNC: 50 MG/DL (ref 74–106)
NT-PROBNP SERPL-MCNC: 2025 PG/ML (ref 1–450)
POTASSIUM SERPL-SCNC: 3.8 MMOL/L (ref 3.5–5.1)
SODIUM SERPL-SCNC: 142 MMOL/L (ref 136–145)

## 2021-09-14 PROCEDURE — 80048 BASIC METABOLIC PANEL: ICD-10-PCS | Mod: QW,,, | Performed by: CLINICAL MEDICAL LABORATORY

## 2021-09-14 PROCEDURE — 99214 OFFICE O/P EST MOD 30 MIN: CPT | Mod: ,,, | Performed by: INTERNAL MEDICINE

## 2021-09-14 PROCEDURE — 83880 ASSAY OF NATRIURETIC PEPTIDE: CPT | Mod: QW,,, | Performed by: CLINICAL MEDICAL LABORATORY

## 2021-09-14 PROCEDURE — 83880 NT-PRO NATRIURETIC PEPTIDE: ICD-10-PCS | Mod: QW,,, | Performed by: CLINICAL MEDICAL LABORATORY

## 2021-09-14 PROCEDURE — 80048 BASIC METABOLIC PNL TOTAL CA: CPT | Mod: QW,,, | Performed by: CLINICAL MEDICAL LABORATORY

## 2021-09-14 PROCEDURE — 99214 PR OFFICE/OUTPT VISIT, EST, LEVL IV, 30-39 MIN: ICD-10-PCS | Mod: ,,, | Performed by: INTERNAL MEDICINE

## 2021-09-14 RX ORDER — FUROSEMIDE 40 MG/1
TABLET ORAL
Qty: 90 TABLET | Refills: 3 | Status: SHIPPED | OUTPATIENT
Start: 2021-09-14 | End: 2021-11-03 | Stop reason: SDUPTHER

## 2021-09-14 RX ORDER — LEVOFLOXACIN 500 MG/1
500 TABLET, FILM COATED ORAL DAILY
Qty: 14 TABLET | Refills: 0 | Status: SHIPPED | OUTPATIENT
Start: 2021-09-14 | End: 2022-02-15 | Stop reason: DRUGHIGH

## 2021-09-14 RX ORDER — TRIAMCINOLONE ACETONIDE 1 MG/G
CREAM TOPICAL 2 TIMES DAILY
Qty: 454 G | Refills: 1 | Status: SHIPPED | OUTPATIENT
Start: 2021-09-14 | End: 2022-04-20 | Stop reason: SDUPTHER

## 2021-09-14 RX ORDER — LOSARTAN POTASSIUM 100 MG/1
100 TABLET ORAL DAILY
Qty: 90 TABLET | Refills: 1 | Status: SHIPPED | OUTPATIENT
Start: 2021-09-14 | End: 2022-03-29

## 2021-09-14 RX ORDER — HYDRALAZINE HYDROCHLORIDE 25 MG/1
25 TABLET, FILM COATED ORAL 3 TIMES DAILY
Qty: 90 TABLET | Refills: 1 | Status: SHIPPED | OUTPATIENT
Start: 2021-09-14 | End: 2021-10-21 | Stop reason: SDUPTHER

## 2021-09-14 RX ORDER — OMEPRAZOLE 20 MG/1
20 CAPSULE, DELAYED RELEASE ORAL DAILY
COMMUNITY
End: 2021-11-03 | Stop reason: SDUPTHER

## 2021-09-14 RX ORDER — PANTOPRAZOLE SODIUM 40 MG/1
40 TABLET, DELAYED RELEASE ORAL DAILY
Qty: 60 TABLET | Refills: 6 | Status: SHIPPED | OUTPATIENT
Start: 2021-09-14 | End: 2023-01-09

## 2021-09-14 RX ORDER — AMLODIPINE BESYLATE 5 MG/1
5 TABLET ORAL DAILY
Qty: 30 TABLET | Refills: 0 | Status: SHIPPED | OUTPATIENT
Start: 2021-09-14 | End: 2021-11-18

## 2021-09-16 ENCOUNTER — OFFICE VISIT (OUTPATIENT)
Dept: FAMILY MEDICINE | Facility: CLINIC | Age: 76
End: 2021-09-16
Payer: COMMERCIAL

## 2021-09-16 VITALS
RESPIRATION RATE: 17 BRPM | SYSTOLIC BLOOD PRESSURE: 129 MMHG | OXYGEN SATURATION: 97 % | BODY MASS INDEX: 30.61 KG/M2 | WEIGHT: 195 LBS | DIASTOLIC BLOOD PRESSURE: 69 MMHG | HEART RATE: 78 BPM | HEIGHT: 67 IN

## 2021-09-16 DIAGNOSIS — I50.9 CONGESTIVE HEART FAILURE, UNSPECIFIED HF CHRONICITY, UNSPECIFIED HEART FAILURE TYPE: Primary | ICD-10-CM

## 2021-09-16 DIAGNOSIS — J90 PLEURAL EFFUSION: ICD-10-CM

## 2021-09-16 PROCEDURE — 99214 PR OFFICE/OUTPT VISIT, EST, LEVL IV, 30-39 MIN: ICD-10-PCS | Mod: ,,, | Performed by: INTERNAL MEDICINE

## 2021-09-16 PROCEDURE — 99214 OFFICE O/P EST MOD 30 MIN: CPT | Mod: ,,, | Performed by: INTERNAL MEDICINE

## 2021-09-16 RX ORDER — TORSEMIDE 20 MG/1
40 TABLET ORAL DAILY
Qty: 60 TABLET | Refills: 3 | Status: SHIPPED | OUTPATIENT
Start: 2021-09-16 | End: 2022-01-18 | Stop reason: SDUPTHER

## 2021-09-22 ENCOUNTER — OFFICE VISIT (OUTPATIENT)
Dept: FAMILY MEDICINE | Facility: CLINIC | Age: 76
End: 2021-09-22
Payer: COMMERCIAL

## 2021-09-22 VITALS
WEIGHT: 199.19 LBS | HEIGHT: 67 IN | HEART RATE: 83 BPM | RESPIRATION RATE: 20 BRPM | OXYGEN SATURATION: 94 % | SYSTOLIC BLOOD PRESSURE: 136 MMHG | DIASTOLIC BLOOD PRESSURE: 67 MMHG | BODY MASS INDEX: 31.26 KG/M2

## 2021-09-22 DIAGNOSIS — I10 ESSENTIAL HYPERTENSION: ICD-10-CM

## 2021-09-22 DIAGNOSIS — I50.9 CONGESTIVE HEART FAILURE, UNSPECIFIED HF CHRONICITY, UNSPECIFIED HEART FAILURE TYPE: Primary | ICD-10-CM

## 2021-09-22 PROCEDURE — 99213 PR OFFICE/OUTPT VISIT, EST, LEVL III, 20-29 MIN: ICD-10-PCS | Mod: ,,, | Performed by: INTERNAL MEDICINE

## 2021-09-22 PROCEDURE — 99213 OFFICE O/P EST LOW 20 MIN: CPT | Mod: ,,, | Performed by: INTERNAL MEDICINE

## 2021-09-27 ENCOUNTER — CLINICAL SUPPORT (OUTPATIENT)
Dept: WOUND CARE | Facility: CLINIC | Age: 76
End: 2021-09-27
Attending: FAMILY MEDICINE
Payer: COMMERCIAL

## 2021-09-27 VITALS
TEMPERATURE: 96 F | RESPIRATION RATE: 20 BRPM | SYSTOLIC BLOOD PRESSURE: 133 MMHG | HEART RATE: 82 BPM | DIASTOLIC BLOOD PRESSURE: 65 MMHG

## 2021-09-27 DIAGNOSIS — U07.1 COVID-19 VIRUS INFECTION: ICD-10-CM

## 2021-09-27 DIAGNOSIS — L97.809 VENOUS STASIS ULCER OF OTHER PART OF LOWER LEG, UNSPECIFIED LATERALITY, UNSPECIFIED ULCER STAGE, UNSPECIFIED WHETHER VARICOSE VEINS PRESENT: ICD-10-CM

## 2021-09-27 DIAGNOSIS — M06.9 RHEUMATOID ARTHRITIS, INVOLVING UNSPECIFIED SITE, UNSPECIFIED WHETHER RHEUMATOID FACTOR PRESENT: ICD-10-CM

## 2021-09-27 DIAGNOSIS — I73.9 PVD (PERIPHERAL VASCULAR DISEASE): Primary | ICD-10-CM

## 2021-09-27 DIAGNOSIS — E11.59 TYPE 2 DIABETES MELLITUS WITH OTHER CIRCULATORY COMPLICATION, UNSPECIFIED WHETHER LONG TERM INSULIN USE: Chronic | ICD-10-CM

## 2021-09-27 DIAGNOSIS — I83.008 VENOUS STASIS ULCER OF OTHER PART OF LOWER LEG, UNSPECIFIED LATERALITY, UNSPECIFIED ULCER STAGE, UNSPECIFIED WHETHER VARICOSE VEINS PRESENT: ICD-10-CM

## 2021-09-27 PROBLEM — I83.009 VENOUS STASIS ULCER: Status: ACTIVE | Noted: 2021-09-27

## 2021-09-27 PROBLEM — L97.909 VENOUS STASIS ULCER: Status: ACTIVE | Noted: 2021-09-27

## 2021-09-27 PROCEDURE — 99214 PR OFFICE/OUTPT VISIT, EST, LEVL IV, 30-39 MIN: ICD-10-PCS | Mod: S$PBB,,, | Performed by: FAMILY MEDICINE

## 2021-09-27 PROCEDURE — 99214 OFFICE O/P EST MOD 30 MIN: CPT | Mod: PBBFAC | Performed by: FAMILY MEDICINE

## 2021-09-27 PROCEDURE — 99214 OFFICE O/P EST MOD 30 MIN: CPT | Mod: S$PBB,,, | Performed by: FAMILY MEDICINE

## 2021-10-07 ENCOUNTER — HOSPITAL ENCOUNTER (OUTPATIENT)
Dept: RADIOLOGY | Facility: HOSPITAL | Age: 76
Discharge: HOME | End: 2021-10-07
Attending: FAMILY MEDICINE
Payer: COMMERCIAL

## 2021-10-07 DIAGNOSIS — I83.008 VENOUS STASIS ULCER OF OTHER PART OF LOWER LEG, UNSPECIFIED LATERALITY, UNSPECIFIED ULCER STAGE, UNSPECIFIED WHETHER VARICOSE VEINS PRESENT: ICD-10-CM

## 2021-10-07 DIAGNOSIS — L97.809 VENOUS STASIS ULCER OF OTHER PART OF LOWER LEG, UNSPECIFIED LATERALITY, UNSPECIFIED ULCER STAGE, UNSPECIFIED WHETHER VARICOSE VEINS PRESENT: ICD-10-CM

## 2021-10-07 PROCEDURE — 93922 UPR/L XTREMITY ART 2 LEVELS: CPT | Mod: TC

## 2021-10-07 PROCEDURE — 93925 US ARTERIAL LOWER EXTREMITY BILAT WITH ABI (XPD): ICD-10-PCS | Mod: 26,,,

## 2021-10-07 PROCEDURE — 93925 LOWER EXTREMITY STUDY: CPT | Mod: 26,,,

## 2021-10-07 PROCEDURE — 93922 US ARTERIAL LOWER EXTREMITY BILAT WITH ABI (XPD): ICD-10-PCS | Mod: 26,,,

## 2021-10-07 PROCEDURE — 93922 UPR/L XTREMITY ART 2 LEVELS: CPT | Mod: 26,,,

## 2021-10-20 ENCOUNTER — OFFICE VISIT (OUTPATIENT)
Dept: CARDIOLOGY | Facility: CLINIC | Age: 76
End: 2021-10-20
Payer: COMMERCIAL

## 2021-10-20 VITALS
WEIGHT: 197 LBS | SYSTOLIC BLOOD PRESSURE: 140 MMHG | OXYGEN SATURATION: 96 % | BODY MASS INDEX: 30.92 KG/M2 | HEART RATE: 73 BPM | HEIGHT: 67 IN | DIASTOLIC BLOOD PRESSURE: 62 MMHG

## 2021-10-20 DIAGNOSIS — I87.2 VENOUS INSUFFICIENCY OF BOTH LOWER EXTREMITIES: Primary | ICD-10-CM

## 2021-10-20 PROCEDURE — 99213 OFFICE O/P EST LOW 20 MIN: CPT | Mod: PBBFAC | Performed by: NURSE PRACTITIONER

## 2021-10-20 PROCEDURE — 99214 PR OFFICE/OUTPT VISIT, EST, LEVL IV, 30-39 MIN: ICD-10-PCS | Mod: S$PBB,,, | Performed by: NURSE PRACTITIONER

## 2021-10-20 PROCEDURE — 99214 OFFICE O/P EST MOD 30 MIN: CPT | Mod: S$PBB,,, | Performed by: NURSE PRACTITIONER

## 2021-10-21 RX ORDER — HYDRALAZINE HYDROCHLORIDE 25 MG/1
25 TABLET, FILM COATED ORAL 3 TIMES DAILY
Qty: 90 TABLET | Refills: 1 | Status: SHIPPED | OUTPATIENT
Start: 2021-10-21 | End: 2022-02-28

## 2021-10-21 RX ORDER — ATORVASTATIN CALCIUM 40 MG/1
40 TABLET, FILM COATED ORAL DAILY
Qty: 90 TABLET | Refills: 1 | Status: SHIPPED | OUTPATIENT
Start: 2021-10-21 | End: 2021-11-18 | Stop reason: SDUPTHER

## 2021-11-02 ENCOUNTER — HOSPITAL ENCOUNTER (OUTPATIENT)
Dept: RADIOLOGY | Facility: HOSPITAL | Age: 76
Discharge: HOME | End: 2021-11-02
Payer: COMMERCIAL

## 2021-11-02 VITALS — WEIGHT: 203 LBS | BODY MASS INDEX: 31.86 KG/M2 | HEIGHT: 67 IN

## 2021-11-02 DIAGNOSIS — Z12.31 SCREENING MAMMOGRAM FOR HIGH-RISK PATIENT: ICD-10-CM

## 2021-11-02 PROCEDURE — 77067 SCR MAMMO BI INCL CAD: CPT | Mod: TC

## 2021-11-03 DIAGNOSIS — I50.9 CONGESTIVE HEART FAILURE, UNSPECIFIED HF CHRONICITY, UNSPECIFIED HEART FAILURE TYPE: ICD-10-CM

## 2021-11-03 RX ORDER — FUROSEMIDE 40 MG/1
TABLET ORAL
Qty: 90 TABLET | Refills: 3 | Status: SHIPPED | OUTPATIENT
Start: 2021-11-03 | End: 2022-01-18 | Stop reason: SDUPTHER

## 2021-11-03 RX ORDER — OMEPRAZOLE 20 MG/1
20 CAPSULE, DELAYED RELEASE ORAL DAILY
Qty: 90 CAPSULE | Refills: 1 | Status: SHIPPED | OUTPATIENT
Start: 2021-11-03 | End: 2023-04-11 | Stop reason: SDUPTHER

## 2021-11-18 RX ORDER — AMLODIPINE BESYLATE 5 MG/1
TABLET ORAL
Qty: 90 TABLET | Status: CANCELLED | OUTPATIENT
Start: 2021-11-18

## 2021-11-18 RX ORDER — ATORVASTATIN CALCIUM 40 MG/1
40 TABLET, FILM COATED ORAL DAILY
Qty: 90 TABLET | Refills: 1 | Status: SHIPPED | OUTPATIENT
Start: 2021-11-18 | End: 2022-03-07 | Stop reason: SDUPTHER

## 2021-11-18 RX ORDER — AMLODIPINE BESYLATE 5 MG/1
5 TABLET ORAL DAILY
Qty: 90 TABLET | Refills: 1 | Status: SHIPPED | OUTPATIENT
Start: 2021-11-18 | End: 2022-10-11

## 2021-11-23 ENCOUNTER — CLINICAL SUPPORT (OUTPATIENT)
Dept: WOUND CARE | Facility: CLINIC | Age: 76
End: 2021-11-23
Attending: FAMILY MEDICINE
Payer: COMMERCIAL

## 2021-11-23 VITALS
SYSTOLIC BLOOD PRESSURE: 165 MMHG | HEART RATE: 80 BPM | DIASTOLIC BLOOD PRESSURE: 82 MMHG | TEMPERATURE: 98 F | RESPIRATION RATE: 20 BRPM

## 2021-11-23 DIAGNOSIS — L97.809 VENOUS STASIS ULCER OF OTHER PART OF LOWER LEG, UNSPECIFIED LATERALITY, UNSPECIFIED ULCER STAGE, UNSPECIFIED WHETHER VARICOSE VEINS PRESENT: Primary | ICD-10-CM

## 2021-11-23 DIAGNOSIS — U07.1 COVID-19 VIRUS INFECTION: ICD-10-CM

## 2021-11-23 DIAGNOSIS — M06.9 RHEUMATOID ARTHRITIS, INVOLVING UNSPECIFIED SITE, UNSPECIFIED WHETHER RHEUMATOID FACTOR PRESENT: ICD-10-CM

## 2021-11-23 DIAGNOSIS — I83.008 VENOUS STASIS ULCER OF OTHER PART OF LOWER LEG, UNSPECIFIED LATERALITY, UNSPECIFIED ULCER STAGE, UNSPECIFIED WHETHER VARICOSE VEINS PRESENT: Primary | ICD-10-CM

## 2021-11-23 DIAGNOSIS — I73.9 PVD (PERIPHERAL VASCULAR DISEASE): ICD-10-CM

## 2021-11-23 DIAGNOSIS — E11.59 TYPE 2 DIABETES MELLITUS WITH OTHER CIRCULATORY COMPLICATION, UNSPECIFIED WHETHER LONG TERM INSULIN USE: ICD-10-CM

## 2021-11-23 PROCEDURE — 99214 PR OFFICE/OUTPT VISIT, EST, LEVL IV, 30-39 MIN: ICD-10-PCS | Mod: S$PBB,,, | Performed by: FAMILY MEDICINE

## 2021-11-23 PROCEDURE — 99215 OFFICE O/P EST HI 40 MIN: CPT | Mod: PBBFAC | Performed by: FAMILY MEDICINE

## 2021-11-23 PROCEDURE — 99214 OFFICE O/P EST MOD 30 MIN: CPT | Mod: S$PBB,,, | Performed by: FAMILY MEDICINE

## 2021-11-23 RX ORDER — INSULIN ASPART 100 [IU]/ML
15 INJECTION, SUSPENSION SUBCUTANEOUS DAILY
COMMUNITY
End: 2022-01-26 | Stop reason: SDUPTHER

## 2021-11-23 RX ORDER — ALBUTEROL SULFATE 90 UG/1
2 AEROSOL, METERED RESPIRATORY (INHALATION) EVERY 4 HOURS PRN
COMMUNITY
End: 2023-01-09 | Stop reason: SDUPTHER

## 2021-11-23 RX ORDER — PREGABALIN 50 MG/1
1 CAPSULE ORAL 3 TIMES DAILY
COMMUNITY

## 2021-11-23 RX ORDER — AMITRIPTYLINE HYDROCHLORIDE 10 MG/1
1 TABLET, FILM COATED ORAL NIGHTLY
COMMUNITY
End: 2023-11-02 | Stop reason: SDUPTHER

## 2021-11-23 RX ORDER — FLUTICASONE FUROATE AND VILANTEROL TRIFENATATE 100; 25 UG/1; UG/1
1 POWDER RESPIRATORY (INHALATION) DAILY PRN
COMMUNITY

## 2021-12-01 ENCOUNTER — OFFICE VISIT (OUTPATIENT)
Dept: FAMILY MEDICINE | Facility: CLINIC | Age: 76
End: 2021-12-01
Payer: COMMERCIAL

## 2021-12-01 VITALS
BODY MASS INDEX: 32.49 KG/M2 | SYSTOLIC BLOOD PRESSURE: 133 MMHG | HEART RATE: 81 BPM | HEIGHT: 67 IN | WEIGHT: 207 LBS | RESPIRATION RATE: 17 BRPM | TEMPERATURE: 99 F | DIASTOLIC BLOOD PRESSURE: 74 MMHG | OXYGEN SATURATION: 96 %

## 2021-12-01 DIAGNOSIS — J30.1 SEASONAL ALLERGIC RHINITIS DUE TO POLLEN: ICD-10-CM

## 2021-12-01 DIAGNOSIS — M17.12 PRIMARY OSTEOARTHRITIS OF LEFT KNEE: ICD-10-CM

## 2021-12-01 DIAGNOSIS — I73.9 PVD (PERIPHERAL VASCULAR DISEASE): ICD-10-CM

## 2021-12-01 DIAGNOSIS — M19.012 OSTEOARTHRITIS OF LEFT SHOULDER, UNSPECIFIED OSTEOARTHRITIS TYPE: ICD-10-CM

## 2021-12-01 DIAGNOSIS — I10 ESSENTIAL HYPERTENSION: Primary | ICD-10-CM

## 2021-12-01 DIAGNOSIS — M25.512 LEFT SHOULDER PAIN, UNSPECIFIED CHRONICITY: ICD-10-CM

## 2021-12-01 DIAGNOSIS — M25.562 LEFT KNEE PAIN, UNSPECIFIED CHRONICITY: ICD-10-CM

## 2021-12-01 DIAGNOSIS — M17.12 OSTEOARTHRITIS OF LEFT KNEE, UNSPECIFIED OSTEOARTHRITIS TYPE: ICD-10-CM

## 2021-12-01 PROCEDURE — 99214 PR OFFICE/OUTPT VISIT, EST, LEVL IV, 30-39 MIN: ICD-10-PCS | Mod: ,,, | Performed by: INTERNAL MEDICINE

## 2021-12-01 PROCEDURE — 99214 OFFICE O/P EST MOD 30 MIN: CPT | Mod: ,,, | Performed by: INTERNAL MEDICINE

## 2021-12-01 RX ORDER — LORATADINE 10 MG/1
10 TABLET ORAL DAILY
Qty: 90 TABLET | Refills: 1 | Status: SHIPPED | OUTPATIENT
Start: 2021-12-01 | End: 2022-04-20 | Stop reason: SDUPTHER

## 2021-12-29 RX ORDER — POTASSIUM CHLORIDE 750 MG/1
10 TABLET, EXTENDED RELEASE ORAL 3 TIMES DAILY
Qty: 90 TABLET | Refills: 3 | Status: SHIPPED | OUTPATIENT
Start: 2021-12-29 | End: 2022-02-15 | Stop reason: DRUGHIGH

## 2022-01-12 NOTE — PATIENT INSTRUCTIONS
Clean with baby shampoo and water    Apply aquacel ag to wound    Cover with dry gauze,wrap with kane,ace wrap, re-wrap if it starts to slide down    Change daily and as needed    Elevate legs as much as possible on pillows when sitting and lying down    No prolong standing

## 2022-01-13 ENCOUNTER — OFFICE VISIT (OUTPATIENT)
Dept: WOUND CARE | Facility: CLINIC | Age: 77
End: 2022-01-13
Attending: FAMILY MEDICINE
Payer: COMMERCIAL

## 2022-01-13 DIAGNOSIS — U07.1 COVID-19 VIRUS INFECTION: ICD-10-CM

## 2022-01-13 DIAGNOSIS — L97.809 VENOUS STASIS ULCER OF OTHER PART OF LOWER LEG, UNSPECIFIED LATERALITY, UNSPECIFIED ULCER STAGE, UNSPECIFIED WHETHER VARICOSE VEINS PRESENT: ICD-10-CM

## 2022-01-13 DIAGNOSIS — I83.008 VENOUS STASIS ULCER OF OTHER PART OF LOWER LEG, UNSPECIFIED LATERALITY, UNSPECIFIED ULCER STAGE, UNSPECIFIED WHETHER VARICOSE VEINS PRESENT: ICD-10-CM

## 2022-01-13 DIAGNOSIS — M06.9 RHEUMATOID ARTHRITIS, INVOLVING UNSPECIFIED SITE, UNSPECIFIED WHETHER RHEUMATOID FACTOR PRESENT: ICD-10-CM

## 2022-01-13 DIAGNOSIS — E11.59 TYPE 2 DIABETES MELLITUS WITH OTHER CIRCULATORY COMPLICATION, UNSPECIFIED WHETHER LONG TERM INSULIN USE: ICD-10-CM

## 2022-01-13 DIAGNOSIS — I73.9 PVD (PERIPHERAL VASCULAR DISEASE): ICD-10-CM

## 2022-01-13 DIAGNOSIS — L97.812 NON-PRESSURE CHRONIC ULCER OF OTHER PART OF RIGHT LOWER LEG WITH FAT LAYER EXPOSED: Primary | ICD-10-CM

## 2022-01-13 DIAGNOSIS — L97.919 VENOUS ULCER OF RIGHT LEG: ICD-10-CM

## 2022-01-13 DIAGNOSIS — I83.019 VENOUS ULCER OF RIGHT LEG: ICD-10-CM

## 2022-01-13 PROCEDURE — 17250 CHEM CAUT OF GRANLTJ TISSUE: CPT | Mod: S$PBB,,, | Performed by: SURGERY

## 2022-01-13 PROCEDURE — 17250 PR CHEM CAUTERY GRANULATN TISSUE: ICD-10-PCS | Mod: S$PBB,,, | Performed by: SURGERY

## 2022-01-13 PROCEDURE — 17250 CHEM CAUT OF GRANLTJ TISSUE: CPT | Mod: PBBFAC | Performed by: SURGERY

## 2022-01-13 PROCEDURE — 99215 OFFICE O/P EST HI 40 MIN: CPT | Mod: PBBFAC | Performed by: SURGERY

## 2022-01-13 NOTE — PROGRESS NOTES
Required two staff for care and transfer to and from car. Instructed son to assist her with re-wrapping her ACE bandage when it start to slide down. He stated he understood.     Debridement Performed for Assessment: Wound # 1  Performed By: Provider: Dr. Bal  Assistant: DORA Arriaga RN    Debridement: Chemical/enzymatic  Debridement Description: Non-Selective      Wound/Ulcer size:    Length: 3.7   Width: 5.8   Depth:0.3  Cm2: 21.4    Photo taken post procedure:  Procedural Pain: Insensate  Post Procedural Pain: Insensate  Response to Treatment: Procedure was tolerated well    Specimen  Was a specimen collected?  No Specimen collected      Graft or Implant Aplpied  Was a graft of implant applied?  No      Post deridement diagnosis  Did the post debridment diagnosis chagne?  The post debridment diagnosis is the same as the pre-op diagnosis.      Assistant of procedure  Who assisted with the procedure?  The assistant was the same as the nurse listed above.      Complication  Complications related to debridement?  No complications noted      Estimated Blood Loss  How much blood loss occured?  No blood loss      Anesthesia  Anesthesia used?  None

## 2022-01-13 NOTE — PROGRESS NOTES
General Surgery Progress Note    Subjective:      Patient ID: Delma Rae is a 76 y.o. female.    Chief Complaint: Venous Ulcer (Right lateral leg)      HPI:  76-year-old female being seen for chronic venous stasis ulcers to the right lower extremity.  She has currently been putting Aquacel and compression dressings on the area and she states is getting better.  No fever chills still having some drainage from the wound.  He shin is getting smaller overall.    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 Chaidez    GERD (gastroesophageal reflux disease)     HHD (hypertensive heart disease)     Hyperlipidemia     Hypertension     MI (myocardial infarction)     OA (osteoarthritis)     Rheumatoid arthritis      Past Surgical History:   Procedure Laterality Date    HYSTERECTOMY      OOPHORECTOMY      VAGINAL DELIVERY      x 1     Social History     Socioeconomic History    Marital status:    Tobacco Use    Smoking status: Never Smoker    Smokeless tobacco: Never Used   Substance and Sexual Activity    Alcohol use: Never    Drug use: Never    Sexual activity: Not Currently     Partners: Male         Current Outpatient Medications:     albuterol (PROVENTIL/VENTOLIN HFA) 90 mcg/actuation inhaler, Inhale 2 puffs into the lungs every 4 (four) hours as needed for Wheezing., Disp: , Rfl:     amitriptyline (ELAVIL) 10 MG tablet, Take 1 tablet by mouth every evening., Disp: , Rfl:     amLODIPine (NORVASC) 5 MG tablet, Take 1 tablet (5 mg total) by mouth once daily., Disp: 90 tablet, Rfl: 1    amoxicillin-clavulanate 500-125mg (AUGMENTIN) 500-125 mg  "Tab, Take 1 tablet (500 mg total) by mouth 2 (two) times daily., Disp: 20 tablet, Rfl: 0    aspirin (ECOTRIN) 81 MG EC tablet, Take 81 mg by mouth once daily., Disp: , Rfl:     atorvastatin (LIPITOR) 40 MG tablet, Take 1 tablet (40 mg total) by mouth once daily., Disp: 90 tablet, Rfl: 1    blood sugar diagnostic Strp, 1 strip by Misc.(Non-Drug; Combo Route) route 3 (three) times daily., Disp: 200 strip, Rfl: 3    fluticasone furoate-vilanteroL (BREO ELLIPTA) 100-25 mcg/dose diskus inhaler, Inhale 1 puff into the lungs daily as needed., Disp: , Rfl:     FLUTICASONE PROPIONATE NASL, 1 spray by Nasal route once daily., Disp: , Rfl:     folic acid (FOLVITE) 1 MG tablet, Take 1 mg by mouth once daily., Disp: , Rfl:     furosemide (LASIX) 40 MG tablet, Take 2 tablets (80 mg total) by mouth every morning AND 1 tablet (40 mg total) every evening., Disp: 90 tablet, Rfl: 3    hydrALAZINE (APRESOLINE) 25 MG tablet, Take 1 tablet (25 mg total) by mouth 3 (three) times daily., Disp: 90 tablet, Rfl: 1    insulin asp prt-insulin aspart, NovoLOG 70/30, (NOVOLOG 70/30) 100 unit/mL (70-30) Soln, Inject 15 Units into the skin once daily. 15 units in am and 10 units in pm, Disp: , Rfl:     insulin syringe-needle U-100 1 mL 31 gauge x 5/16 Syrg, Check blood glucose 2 times a day as needed., Disp: 200 each, Rfl: 3    insulin syringe-needle U-100 1/2 mL 31 gauge x 15/64" Syrg, by Misc.(Non-Drug; Combo Route) route. Use as directed with insulin, Disp: , Rfl:     latanoprost 0.005 % ophthalmic solution, Place 1 drop into both eyes once daily., Disp: , Rfl:     levoFLOXacin (LEVAQUIN) 500 MG tablet, Take 1 tablet (500 mg total) by mouth once daily., Disp: 14 tablet, Rfl: 0    loratadine (CLARITIN) 10 mg tablet, Take 1 tablet (10 mg total) by mouth once daily., Disp: 90 tablet, Rfl: 1    losartan (COZAAR) 100 MG tablet, Take 1 tablet (100 mg total) by mouth once daily., Disp: 90 tablet, Rfl: 1    methotrexate 2.5 MG Tab, Take " 2.5 mg by mouth every 7 days. 5 tablets q7 days, Disp: , Rfl:     metoprolol tartrate (LOPRESSOR) 25 MG tablet, Take 1 tablet (25 mg total) by mouth 3 (three) times daily., Disp: 90 tablet, Rfl: 3    multivitamin/iron/folic acid (CENTRUM ULTRA WOMEN'S ORAL), Take 1 tablet by mouth once daily., Disp: , Rfl:     mupirocin (BACTROBAN) 2 % ointment, Apply topically 3 (three) times daily., Disp: 1 Tube, Rfl: 1    naproxen (NAPROSYN) 500 MG tablet, Take 500 mg by mouth 2 (two) times daily as needed. , Disp: , Rfl:     NOVOLIN 70/30 U-100 INSULIN 100 unit/mL (70-30) injection, Inject into the skin 2 (two) times a day. 40 units in am and 15 units at night, Disp: , Rfl:     omeprazole (PRILOSEC) 20 MG capsule, Take 1 capsule (20 mg total) by mouth once daily., Disp: 90 capsule, Rfl: 1    ONETOUCH ULTRA BLUE TEST STRIP Strp, 1 strip by abdominal subcutaneous route 2 (two) times a day. Check glucose twice a day, Disp: 200 strip, Rfl: 5    pantoprazole (PROTONIX) 40 MG tablet, Take 1 tablet (40 mg total) by mouth once daily., Disp: 60 tablet, Rfl: 6    potassium chloride SA (K-DUR,KLOR-CON) 10 MEQ tablet, Take 1 tablet (10 mEq total) by mouth 2 (two) times daily., Disp: 60 tablet, Rfl: 0    potassium chloride SA (K-DUR,KLOR-CON) 10 MEQ tablet, Take 1 tablet (10 mEq total) by mouth 3 (three) times daily., Disp: 90 tablet, Rfl: 3    potassium chloride SA (K-DUR,KLOR-CON) 10 MEQ tablet, TAKE ONE TABLET BY MOUTH THREE TIMES DAILY, Disp: 90 tablet, Rfl: 1    predniSONE (DELTASONE) 5 MG tablet, Take 5 mg by mouth daily as needed. , Disp: , Rfl:     pregabalin (LYRICA) 50 MG capsule, Take 1 capsule by mouth 3 (three) times daily., Disp: , Rfl:     torsemide (DEMADEX) 20 MG Tab, Take 2 tablets (40 mg total) by mouth once daily., Disp: 60 tablet, Rfl: 3    triamcinolone acetonide 0.1% (KENALOG) 0.1 % cream, Apply topically 2 (two) times daily. Apply to affected area twice a day as needed, Disp: 454 g, Rfl: 1     venlafaxine (EFFEXOR-XR) 75 MG 24 hr capsule, TAKE ONE CAPSULE BY MOUTH EVERY OTHER DAY, Disp: 45 capsule, Rfl: 1    zolpidem (AMBIEN) 5 MG Tab, Take 5 mg by mouth nightly as needed., Disp: , Rfl:   Review of patient's allergies indicates:  No Known Allergies    There were no vitals taken for this visit.    Review of Systems   Constitutional: Negative for chills, fever, weight gain and weight loss.   Eyes: Negative for blurred vision.   Cardiovascular: Negative for chest pain, claudication and palpitations.   Respiratory: Negative for cough and shortness of breath.    Skin: Positive for poor wound healing.   Musculoskeletal: Positive for muscle weakness.   Gastrointestinal: Negative for abdominal pain, diarrhea, nausea and vomiting.   Neurological: Negative for numbness and paresthesias.         Objective:     Constitutional: Well, No apparent distress  Mental Status: Alert and oriented  x 3  Eyes: Normal sclera, normal eyelid  Neck: Trachea midline, no masses on neck exam  Respiratory: Clear to auscultation bilaterally with no wheezes/crackles/cough  Cardiac: Regular rate and rhythm, no murmur, rub, or gallops  Abdominal: Soft, non-tender, non-distented  Hernia: No appreciated hernias  Musculoskeletal: 5/5 strength no weakess no decreased range of montion  Neurologic: Cranial nerves II - XII intact  Mid/lateral aspect of the right calf with obvious edema and area with granulating tissues.  Hypergranulation appreciated the middle portion of this wound.  See the note for full dimensions.  Labs/ Imaging:   CBC:   Lab Results   Component Value Date/Time    WBC 9.56 08/06/2021 08:27 AM    RBC 5.40 08/06/2021 08:27 AM    HGB 13.3 08/06/2021 08:27 AM    HCT 40.9 08/06/2021 08:27 AM     08/06/2021 08:27 AM    MCV 75.7 (L) 08/06/2021 08:27 AM    MCH 24.6 (L) 08/06/2021 08:27 AM    MCHC 32.5 08/06/2021 08:27 AM     BMP:   Lab Results   Component Value Date/Time    GLU 50 (L) 09/14/2021 03:23 PM    CO2 33 (H)  09/14/2021 03:23 PM    BUN 26 (H) 09/14/2021 03:23 PM    CREATININE 1.18 (H) 09/14/2021 03:23 PM    CALCIUM 9.6 09/14/2021 03:23 PM    MG 1.8 08/03/2021 05:24 PM           Assessment:             1. Non-pressure chronic ulcer of other part of right lower leg with fat layer exposed    2. Venous ulcer of right leg    3. Venous stasis ulcer of other part of lower leg, unspecified laterality, unspecified ulcer stage, unspecified whether varicose veins present    4. PVD (peripheral vascular disease)    5. COVID-19 virus infection    6. Rheumatoid arthritis, involving unspecified site, unspecified whether rheumatoid factor present    7. Type 2 diabetes mellitus with other circulatory complication, unspecified whether long term insulin use          Plan:          No follow-ups on file.      will silver nitrate these areas for the hypergranulation tissue today.  Will have patient continue Aquacel and compression to the area and come back in 3 weeks time.

## 2022-01-18 DIAGNOSIS — I50.9 CONGESTIVE HEART FAILURE, UNSPECIFIED HF CHRONICITY, UNSPECIFIED HEART FAILURE TYPE: ICD-10-CM

## 2022-01-18 RX ORDER — FUROSEMIDE 40 MG/1
TABLET ORAL
Qty: 90 TABLET | Refills: 1 | Status: SHIPPED | OUTPATIENT
Start: 2022-01-18 | End: 2022-03-08

## 2022-01-18 RX ORDER — TORSEMIDE 20 MG/1
40 TABLET ORAL DAILY
Qty: 60 TABLET | Refills: 3 | Status: SHIPPED | OUTPATIENT
Start: 2022-01-18 | End: 2022-05-28

## 2022-01-18 NOTE — TELEPHONE ENCOUNTER
Pt son brought 8 prescription bottles for meds to be refilled. Only 2 of the medications did not have any refills left. Those refills will be sent to [facility].

## 2022-01-26 RX ORDER — INSULIN ASPART 100 [IU]/ML
15 INJECTION, SUSPENSION SUBCUTANEOUS DAILY
Qty: 10 ML | Refills: 2 | Status: SHIPPED | OUTPATIENT
Start: 2022-01-26 | End: 2022-04-20 | Stop reason: SDUPTHER

## 2022-02-02 NOTE — TELEPHONE ENCOUNTER
----- Message from Amada Ding sent at 2/2/2022  8:56 AM CST -----  NEEDS REFILL NOVOLIN 70/30 SENT IN-       Patient asked for insulin novolin 70/30 to be refilled. Informed will send to doctor to be sent over to pharmacy-[facility]. Voiced understanding.

## 2022-03-07 DIAGNOSIS — I50.9 CONGESTIVE HEART FAILURE, UNSPECIFIED HF CHRONICITY, UNSPECIFIED HEART FAILURE TYPE: ICD-10-CM

## 2022-03-07 NOTE — TELEPHONE ENCOUNTER
----- Message from Chris Miller sent at 3/7/2022  3:53 PM CST -----  Refills on furosemide (LASIX) 40 MG tablet,atorvastatin (LIPITOR) 40 MG tablet and POT CL MICRO 10MEQ    Pt needs refills on lasix, lipitor, and potassium

## 2022-03-08 DIAGNOSIS — I50.9 CONGESTIVE HEART FAILURE, UNSPECIFIED HF CHRONICITY, UNSPECIFIED HEART FAILURE TYPE: ICD-10-CM

## 2022-03-08 RX ORDER — FUROSEMIDE 40 MG/1
TABLET ORAL
Qty: 90 TABLET | Refills: 1 | Status: SHIPPED | OUTPATIENT
Start: 2022-03-08 | End: 2022-03-08 | Stop reason: SDUPTHER

## 2022-03-08 RX ORDER — FUROSEMIDE 40 MG/1
TABLET ORAL
Qty: 90 TABLET | Refills: 1 | Status: SHIPPED | OUTPATIENT
Start: 2022-03-08 | End: 2022-04-26 | Stop reason: ALTCHOICE

## 2022-03-08 RX ORDER — POTASSIUM CHLORIDE 750 MG/1
10 TABLET, EXTENDED RELEASE ORAL 3 TIMES DAILY
Qty: 90 TABLET | Refills: 1 | Status: SHIPPED | OUTPATIENT
Start: 2022-03-08 | End: 2022-09-15 | Stop reason: SDUPTHER

## 2022-03-08 RX ORDER — ATORVASTATIN CALCIUM 40 MG/1
40 TABLET, FILM COATED ORAL DAILY
Qty: 90 TABLET | Refills: 1 | Status: SHIPPED | OUTPATIENT
Start: 2022-03-08 | End: 2022-04-20 | Stop reason: SDUPTHER

## 2022-03-24 ENCOUNTER — OFFICE VISIT (OUTPATIENT)
Dept: WOUND CARE | Facility: CLINIC | Age: 77
End: 2022-03-24
Attending: FAMILY MEDICINE
Payer: COMMERCIAL

## 2022-03-24 VITALS
HEART RATE: 80 BPM | DIASTOLIC BLOOD PRESSURE: 73 MMHG | TEMPERATURE: 98 F | RESPIRATION RATE: 20 BRPM | SYSTOLIC BLOOD PRESSURE: 137 MMHG

## 2022-03-24 DIAGNOSIS — L97.812 NON-PRESSURE CHRONIC ULCER OF OTHER PART OF RIGHT LOWER LEG WITH FAT LAYER EXPOSED: Primary | ICD-10-CM

## 2022-03-24 DIAGNOSIS — I73.9 PVD (PERIPHERAL VASCULAR DISEASE): ICD-10-CM

## 2022-03-24 PROCEDURE — 1159F PR MEDICATION LIST DOCUMENTED IN MEDICAL RECORD: ICD-10-PCS | Mod: CPTII,,, | Performed by: NURSE PRACTITIONER

## 2022-03-24 PROCEDURE — 3075F PR MOST RECENT SYSTOLIC BLOOD PRESS GE 130-139MM HG: ICD-10-PCS | Mod: CPTII,,, | Performed by: NURSE PRACTITIONER

## 2022-03-24 PROCEDURE — 1159F MED LIST DOCD IN RCRD: CPT | Mod: CPTII,,, | Performed by: NURSE PRACTITIONER

## 2022-03-24 PROCEDURE — 99213 PR OFFICE/OUTPT VISIT, EST, LEVL III, 20-29 MIN: ICD-10-PCS | Mod: S$PBB,,, | Performed by: NURSE PRACTITIONER

## 2022-03-24 PROCEDURE — 99215 OFFICE O/P EST HI 40 MIN: CPT | Mod: PBBFAC | Performed by: NURSE PRACTITIONER

## 2022-03-24 PROCEDURE — 3288F PR FALLS RISK ASSESSMENT DOCUMENTED: ICD-10-PCS | Mod: CPTII,,, | Performed by: NURSE PRACTITIONER

## 2022-03-24 PROCEDURE — 4010F ACE/ARB THERAPY RXD/TAKEN: CPT | Mod: CPTII,,, | Performed by: NURSE PRACTITIONER

## 2022-03-24 PROCEDURE — 99213 OFFICE O/P EST LOW 20 MIN: CPT | Mod: S$PBB,,, | Performed by: NURSE PRACTITIONER

## 2022-03-24 PROCEDURE — 3075F SYST BP GE 130 - 139MM HG: CPT | Mod: CPTII,,, | Performed by: NURSE PRACTITIONER

## 2022-03-24 PROCEDURE — 1160F RVW MEDS BY RX/DR IN RCRD: CPT | Mod: CPTII,,, | Performed by: NURSE PRACTITIONER

## 2022-03-24 PROCEDURE — 4010F PR ACE/ARB THEARPY RXD/TAKEN: ICD-10-PCS | Mod: CPTII,,, | Performed by: NURSE PRACTITIONER

## 2022-03-24 PROCEDURE — 1160F PR REVIEW ALL MEDS BY PRESCRIBER/CLIN PHARMACIST DOCUMENTED: ICD-10-PCS | Mod: CPTII,,, | Performed by: NURSE PRACTITIONER

## 2022-03-24 PROCEDURE — 1101F PR PT FALLS ASSESS DOC 0-1 FALLS W/OUT INJ PAST YR: ICD-10-PCS | Mod: CPTII,,, | Performed by: NURSE PRACTITIONER

## 2022-03-24 PROCEDURE — 1101F PT FALLS ASSESS-DOCD LE1/YR: CPT | Mod: CPTII,,, | Performed by: NURSE PRACTITIONER

## 2022-03-24 PROCEDURE — 3078F PR MOST RECENT DIASTOLIC BLOOD PRESSURE < 80 MM HG: ICD-10-PCS | Mod: CPTII,,, | Performed by: NURSE PRACTITIONER

## 2022-03-24 PROCEDURE — 3078F DIAST BP <80 MM HG: CPT | Mod: CPTII,,, | Performed by: NURSE PRACTITIONER

## 2022-03-24 PROCEDURE — 3288F FALL RISK ASSESSMENT DOCD: CPT | Mod: CPTII,,, | Performed by: NURSE PRACTITIONER

## 2022-03-24 NOTE — PATIENT INSTRUCTIONS
Clean with baby shampoo and water     Apply gray polymem to wound bed then cover with pink polymem     Cover with dry gauze,wrap with kaneace wrap from toes to just below knee    Change dressing Mon,Fri,Tues then every 4 days     If wrap slides down re wrap and if becomes to tight remove        Elevate legs as much as possible on pillows when sitting and lying down     No prolong standing

## 2022-03-28 NOTE — PROGRESS NOTES
MYA Escobar   [facility] - WOUND CARE  [address] [facility] [address]  [phone]      PATIENT NAME: Delma Rae  : 1945  DATE: 3/24/22  MRN: [id number]      Billing Provider: MYA Escobar  Level of Service:   Patient PCP Information     Provider PCP Type    Fco Chaparro MD General          Reason for Visit / Chief Complaint: Venous Ulcer (Right lower leg)       History of Present Illness / Problem Focused Workflow     Delma Rae is a 76 y.o. female who presents to clinic for follow up on chronic-non healing wound on the right lower leg. Patient is compliant with current treatment plan. Current topical wound care is aquacel ag. Wound is hypergranulated, will change to polymem silver and polymem absorbant max. Pertinent factors that delay wound healing include multiple co-morbidities, poor vascular supply, diabetes, edema, heavy drainage, excessive wound moisture and macerated tissue, large surface area, advanced age and fragile skin. Denies fever and chills.        Review of Systems     Review of Systems   Constitutional: Negative for activity change, chills and fever.   Respiratory: Negative for chest tightness and shortness of breath.    Cardiovascular: Positive for leg swelling. Negative for chest pain and palpitations.   Musculoskeletal: Positive for arthralgias and joint swelling.   Skin: Positive for wound.        See wound assessment   Neurological: Positive for weakness and numbness.   Psychiatric/Behavioral: Negative for agitation, behavioral problems, confusion and self-injury.       Medical / Social / Family History     Past Medical History:   Diagnosis Date    Anxiety disorder 2014    Arthritis     Cardiomyopathy     COPD (chronic obstructive pulmonary disease)     Coronary artery disease     Diabetes mellitus, type 2     Dilated cardiomyopathy 2016    DJD of right shoulder 10/08/2014    Fibrosis of  lung 08/04/2015    F/uU BY dr. Chaidez    GERD (gastroesophageal reflux disease)     HHD (hypertensive heart disease)     Hyperlipidemia     Hypertension     MI (myocardial infarction)     OA (osteoarthritis)     Rheumatoid arthritis        Past Surgical History:   Procedure Laterality Date    HYSTERECTOMY      OOPHORECTOMY      VAGINAL DELIVERY      x 1       Social History  Ms. Delma Rae  reports that she has never smoked. She has never used smokeless tobacco. She reports that she does not drink alcohol and does not use drugs.    Family History  Ms.'s Delma Rae family history includes Cancer in her brother and father; Coronary artery disease in her brother and sister; Diabetes in her father and mother; Hypertension in her mother and son; Prostate cancer in her father.    Medications and Allergies     Medications  No outpatient medications have been marked as taking for the 3/24/22 encounter (Office Visit) with MYA Escobar.       Allergies  Review of patient's allergies indicates:  No Known Allergies    Physical Examination     Vitals:    03/24/22 1106   BP: 137/73   Pulse: 80   Resp: 20   Temp: 98 °F (36.7 °C)     Physical Exam  Vitals and nursing note reviewed.   Constitutional:       Appearance: Normal appearance.   HENT:      Head: Normocephalic.   Cardiovascular:      Rate and Rhythm: Normal rate and regular rhythm.      Pulses: Normal pulses.      Heart sounds: Normal heart sounds.   Pulmonary:      Effort: Pulmonary effort is normal. No respiratory distress.   Chest:      Chest wall: No tenderness.   Musculoskeletal:         General: Swelling present.      Right lower leg: Edema present.   Skin:     General: Skin is warm and dry.      Capillary Refill: Capillary refill takes 2 to 3 seconds.      Comments: See wound assessment    Neurological:      General: No focal deficit present.      Mental Status: She is alert and oriented to person, place, and time. Mental status is  at baseline.   Psychiatric:         Mood and Affect: Mood normal.         Behavior: Behavior normal.         Thought Content: Thought content normal.         Judgment: Judgment normal.         Assessment and Plan      1. Non-pressure chronic ulcer of other part of right lower leg with fat layer exposed    2. PVD (peripheral vascular disease)           Wound 09/27/21 Right lower;lateral Leg #3 (Active)   09/27/21     Pre-existing:    Primary Wound Type:    Side: Right   Orientation: lower;lateral   Location: Leg   Wound Number: #3   Ankle-Brachial Index:    Pulses:    Removal Indication and Assessment:    Wound Outcome:    (Retired) Wound Type:    (Retired) Wound Length (cm):    (Retired) Wound Width (cm):    (Retired) Depth (cm):    Wound Description (Comments):    Removal Indications:    Wound Image    03/24/22 1059   Wound WDL WDL 03/24/22 1059   Dressing Appearance Dry;Intact;Clean 03/24/22 1059   Drainage Amount Moderate 03/24/22 1059   Drainage Characteristics/Odor Serosanguineous 03/24/22 1059   Appearance Intact;Pink;Red;Moist;Granulating;Epithelialization 03/24/22 1059   Tissue loss description Full thickness 03/24/22 1059   Black (%), Wound Tissue Color 0 % 03/24/22 1059   Red (%), Wound Tissue Color 90 % 03/24/22 1059   Yellow (%), Wound Tissue Color 10 % 03/24/22 1059   Periwound Area Intact 03/24/22 1059   Wound Edges Approximated 03/24/22 1059   Wound Length (cm) 2.2 cm 03/24/22 1059   Wound Width (cm) 4.1 cm 03/24/22 1059   Wound Depth (cm) 0.2 cm 03/24/22 1059   Wound Volume (cm^3) 1.804 cm^3 03/24/22 1059   Wound Surface Area (cm^2) 9.02 cm^2 03/24/22 1059   Care Cleansed with:;Antimicrobial agent 03/24/22 1059   Dressing Applied;Silver;Calcium alginate;Gauze;Rolled gauze 03/24/22 1059       [REMOVED]      Wound 08/03/21 2240 Blister(s) Right anterior;lower;proximal;lateral Leg #1 (Removed)   08/03/21 2240    Pre-existing:    Primary Wound Type: Blister(s)   Side: Right   Orientation:  anterior;lower;proximal;lateral   Location: Leg   Wound Number: #1   Ankle-Brachial Index:    Pulses:    Removal Indication and Assessment:    Wound Outcome: Healed   (Retired) Wound Type:    (Retired) Wound Length (cm):    (Retired) Wound Width (cm):    (Retired) Depth (cm):    Wound Description (Comments):    Removal Indications:    Removed 03/24/22 1104   Wound Image   03/24/22 1102   Dressing Appearance Open to air 03/24/22 1102   Drainage Amount None 03/24/22 1102   Appearance Intact;Pink 03/24/22 1102   Tissue loss description Partial thickness 03/24/22 1102   Black (%), Wound Tissue Color 0 % 03/24/22 1102   Red (%), Wound Tissue Color 0 % 03/24/22 1102   Yellow (%), Wound Tissue Color 0 % 03/24/22 1102   Periwound Area Intact 03/24/22 1102   Wound Edges Approximated 03/24/22 1102   Wound Length (cm) 0 cm 03/24/22 1102   Wound Width (cm) 0 cm 03/24/22 1102   Wound Depth (cm) 0 cm 03/24/22 1102   Wound Volume (cm^3) 0 cm^3 03/24/22 1102   Wound Surface Area (cm^2) 0 cm^2 03/24/22 1102   Care Cleansed with:;Antimicrobial agent 03/24/22 1102   Dressing Applied 03/24/22 1102         Active Problem List with Overview Notes    Diagnosis Date Noted    Venous insufficiency of both lower extremities 10/20/2021     With a ulcer on her RLE x 2 months;   Being seen by wound care      Venous stasis ulcer 09/27/2021     infected.      PVD (peripheral vascular disease) 09/27/2021    COVID-19 08/06/2021    COVID-19 virus infection 08/03/2021    Acute on chronic systolic congestive heart failure 08/03/2021    Essential hypertension 08/03/2021    Diabetes mellitus, type 2 08/03/2021     Continue Accucheck AC&HS  Continue diabetic diet  Continune home diabetic medication      S/P 2-vessel coronary artery bypass 05/10/2021     7/2004 Dr. Leena TRIVEDI top the LAD and SVG to the OM1      COPD (chronic obstructive pulmonary disease)      Requires continuous supplemental oxygen      HHD (hypertensive heart disease)      MI (myocardial infarction)      7/2004      Coronary artery disease      CABG 2004      Cardiomyopathy      EF 35% by LVgram 7/17/2020  35-40 % by echo 3/9/2020      Pulmonary hypertension      Severe by echo 3/9/2020      Rheumatoid arthritis          :    Plan:   Clean with baby shampoo and water  Apply gray polymem to wound bed then cover with pink polymem  Cover with dry gauze,wrap with kane,ace wrap from toes to just below knee  Change dressing Mon,Fri,Tues then every 4 days  If wrap slides down re wrap and if becomes to tight remove  Monitor closely for s/s of infection including fever, chills, increase in pain, odor from wound, and increased redness from foot. Go to ER if any complications develop.   Keep leg elevated and avoid pressure on wound.    Problem List Items Addressed This Visit        Cardiac/Vascular    PVD (peripheral vascular disease)      Other Visit Diagnoses     Non-pressure chronic ulcer of other part of right lower leg with fat layer exposed    -  Primary          Future Appointments   Date Time Provider Department Center   4/14/2022 10:00 AM MYA Escobar [facility]    4/26/2022  1:00 PM CRISTINA Smith OBC C[facility]   11/8/2022  1:30 PM [facility] MAMMO1 OB [facility]            Signature:  MYA Escobar  [facility] - WOUND CARE  [address]  [place] [address]  [phone]    Date of encounter: 3/24/22

## 2022-03-29 RX ORDER — LOSARTAN POTASSIUM 100 MG/1
TABLET ORAL
Qty: 30 TABLET | Refills: 0 | Status: SHIPPED | OUTPATIENT
Start: 2022-03-29 | End: 2022-04-06 | Stop reason: SDUPTHER

## 2022-03-29 NOTE — TELEPHONE ENCOUNTER
----- Message from Chris Miller sent at 3/29/2022 10:31 AM CDT -----  Refills on LOSARTAN POT 100MG WHITNEY MENDIOLA    Spoke with [id number] him other provider Dr. Smart will send med to pharmacy. Voiced understanding. Also informed son that he could call back when he gets a chance-patient will need an appointment for next week to see Dr. Chaparro. Voiced understanding.

## 2022-04-06 RX ORDER — LOSARTAN POTASSIUM 100 MG/1
100 TABLET ORAL DAILY
Qty: 30 TABLET | Refills: 0 | Status: SHIPPED | OUTPATIENT
Start: 2022-04-06 | End: 2022-04-20 | Stop reason: SDUPTHER

## 2022-04-12 ENCOUNTER — OFFICE VISIT (OUTPATIENT)
Dept: WOUND CARE | Facility: CLINIC | Age: 77
End: 2022-04-12
Attending: FAMILY MEDICINE
Payer: COMMERCIAL

## 2022-04-12 VITALS
HEART RATE: 74 BPM | SYSTOLIC BLOOD PRESSURE: 139 MMHG | RESPIRATION RATE: 20 BRPM | DIASTOLIC BLOOD PRESSURE: 77 MMHG | TEMPERATURE: 97 F

## 2022-04-12 DIAGNOSIS — L97.812 NON-PRESSURE CHRONIC ULCER OF OTHER PART OF RIGHT LOWER LEG WITH FAT LAYER EXPOSED: Primary | ICD-10-CM

## 2022-04-12 DIAGNOSIS — I73.9 PVD (PERIPHERAL VASCULAR DISEASE): ICD-10-CM

## 2022-04-12 DIAGNOSIS — L97.919 VENOUS ULCER OF RIGHT LEG: ICD-10-CM

## 2022-04-12 DIAGNOSIS — I83.019 VENOUS ULCER OF RIGHT LEG: ICD-10-CM

## 2022-04-12 PROCEDURE — 3078F PR MOST RECENT DIASTOLIC BLOOD PRESSURE < 80 MM HG: ICD-10-PCS | Mod: CPTII,,, | Performed by: NURSE PRACTITIONER

## 2022-04-12 PROCEDURE — 3078F DIAST BP <80 MM HG: CPT | Mod: CPTII,,, | Performed by: NURSE PRACTITIONER

## 2022-04-12 PROCEDURE — 1160F RVW MEDS BY RX/DR IN RCRD: CPT | Mod: CPTII,,, | Performed by: NURSE PRACTITIONER

## 2022-04-12 PROCEDURE — 1126F PR PAIN SEVERITY QUANTIFIED, NO PAIN PRESENT: ICD-10-PCS | Mod: CPTII,,, | Performed by: NURSE PRACTITIONER

## 2022-04-12 PROCEDURE — 1159F MED LIST DOCD IN RCRD: CPT | Mod: CPTII,,, | Performed by: NURSE PRACTITIONER

## 2022-04-12 PROCEDURE — 3075F PR MOST RECENT SYSTOLIC BLOOD PRESS GE 130-139MM HG: ICD-10-PCS | Mod: CPTII,,, | Performed by: NURSE PRACTITIONER

## 2022-04-12 PROCEDURE — 99213 PR OFFICE/OUTPT VISIT, EST, LEVL III, 20-29 MIN: ICD-10-PCS | Mod: S$PBB,,, | Performed by: NURSE PRACTITIONER

## 2022-04-12 PROCEDURE — 3075F SYST BP GE 130 - 139MM HG: CPT | Mod: CPTII,,, | Performed by: NURSE PRACTITIONER

## 2022-04-12 PROCEDURE — 1126F AMNT PAIN NOTED NONE PRSNT: CPT | Mod: CPTII,,, | Performed by: NURSE PRACTITIONER

## 2022-04-12 PROCEDURE — 1101F PR PT FALLS ASSESS DOC 0-1 FALLS W/OUT INJ PAST YR: ICD-10-PCS | Mod: CPTII,,, | Performed by: NURSE PRACTITIONER

## 2022-04-12 PROCEDURE — 4010F PR ACE/ARB THEARPY RXD/TAKEN: ICD-10-PCS | Mod: CPTII,,, | Performed by: NURSE PRACTITIONER

## 2022-04-12 PROCEDURE — 1101F PT FALLS ASSESS-DOCD LE1/YR: CPT | Mod: CPTII,,, | Performed by: NURSE PRACTITIONER

## 2022-04-12 PROCEDURE — 1159F PR MEDICATION LIST DOCUMENTED IN MEDICAL RECORD: ICD-10-PCS | Mod: CPTII,,, | Performed by: NURSE PRACTITIONER

## 2022-04-12 PROCEDURE — 3288F FALL RISK ASSESSMENT DOCD: CPT | Mod: CPTII,,, | Performed by: NURSE PRACTITIONER

## 2022-04-12 PROCEDURE — 99213 OFFICE O/P EST LOW 20 MIN: CPT | Mod: PBBFAC | Performed by: NURSE PRACTITIONER

## 2022-04-12 PROCEDURE — 3288F PR FALLS RISK ASSESSMENT DOCUMENTED: ICD-10-PCS | Mod: CPTII,,, | Performed by: NURSE PRACTITIONER

## 2022-04-12 PROCEDURE — 4010F ACE/ARB THERAPY RXD/TAKEN: CPT | Mod: CPTII,,, | Performed by: NURSE PRACTITIONER

## 2022-04-12 PROCEDURE — 1160F PR REVIEW ALL MEDS BY PRESCRIBER/CLIN PHARMACIST DOCUMENTED: ICD-10-PCS | Mod: CPTII,,, | Performed by: NURSE PRACTITIONER

## 2022-04-12 PROCEDURE — 99213 OFFICE O/P EST LOW 20 MIN: CPT | Mod: S$PBB,,, | Performed by: NURSE PRACTITIONER

## 2022-04-12 NOTE — PATIENT INSTRUCTIONS
Clean with baby shampoo and water    Apply Iodosorb gel to wound bed then cover      Cover with dry gauze, ABD, wrap with kane, ace wrap from toes to just below knee     Change dressing every other day and as needed     If wrap slides down re wrap and if becomes to tight remove     Elevate legs as much as possible on pillows when sitting and lying down     No prolong standing    Medication sent to pharmacy

## 2022-04-15 NOTE — PROGRESS NOTES
MYA Escobar   [facility] - WOUND CARE  [address] [facility] [address]  [phone]      PATIENT NAME: Delma Rae  : 1945  DATE: 22  MRN: [id number]      Billing Provider: MYA Escobar  Level of Service:   Patient PCP Information     Provider PCP Type    Fco Chaparro MD General          Reason for Visit / Chief Complaint: Non-healing Wound and Venous Ulcer (Right lower leg)       History of Present Illness / Problem Focused Workflow     Delma Rae is a 76 y.o. female who presents to clinic for follow up on chronic-non healing wound on the right lower leg. Patient is compliant with current treatment plan. Current topical wound care is aquacel ag. Wound is hypergranulated, will change to iodosorb. Pertinent factors that delay wound healing include multiple co-morbidities, poor vascular supply, diabetes, edema, heavy drainage, excessive wound moisture and macerated tissue, large surface area, advanced age and fragile skin. Denies fever and chills.        Review of Systems     Review of Systems   Constitutional: Negative for activity change, chills and fever.   Respiratory: Negative for chest tightness and shortness of breath.    Cardiovascular: Positive for leg swelling. Negative for chest pain and palpitations.   Musculoskeletal: Positive for arthralgias and joint swelling.   Skin: Positive for wound.        See wound assessment   Neurological: Positive for weakness and numbness.   Psychiatric/Behavioral: Negative for agitation, behavioral problems, confusion and self-injury.       Medical / Social / Family History     Past Medical History:   Diagnosis Date    Anxiety disorder 2014    Arthritis     Cardiomyopathy     COPD (chronic obstructive pulmonary disease)     Coronary artery disease     Diabetes mellitus, type 2     Dilated cardiomyopathy 2016    DJD of right shoulder 10/08/2014    Fibrosis of lung  08/04/2015    F/uU BY dr. Chaidez    GERD (gastroesophageal reflux disease)     HHD (hypertensive heart disease)     Hyperlipidemia     Hypertension     MI (myocardial infarction)     OA (osteoarthritis)     Rheumatoid arthritis        Past Surgical History:   Procedure Laterality Date    HYSTERECTOMY      OOPHORECTOMY      VAGINAL DELIVERY      x 1       Social History  Ms. Delma Rae  reports that she has never smoked. She has never used smokeless tobacco. She reports that she does not drink alcohol and does not use drugs.    Family History  Ms.'s Delma Rae family history includes Cancer in her brother and father; Coronary artery disease in her brother and sister; Diabetes in her father and mother; Hypertension in her mother and son; Prostate cancer in her father.    Medications and Allergies     Medications  Outpatient Medications Marked as Taking for the 4/12/22 encounter (Office Visit) with MYA Escobar   Medication Sig Dispense Refill    albuterol (PROVENTIL/VENTOLIN HFA) 90 mcg/actuation inhaler Inhale 2 puffs into the lungs every 4 (four) hours as needed for Wheezing.      amitriptyline (ELAVIL) 10 MG tablet Take 1 tablet by mouth every evening.      amLODIPine (NORVASC) 5 MG tablet Take 1 tablet (5 mg total) by mouth once daily. 90 tablet 1    aspirin (ECOTRIN) 81 MG EC tablet Take 81 mg by mouth once daily.      atorvastatin (LIPITOR) 40 MG tablet Take 1 tablet (40 mg total) by mouth once daily. 90 tablet 1    blood sugar diagnostic Strp 1 strip by Misc.(Non-Drug; Combo Route) route 3 (three) times daily. 200 strip 3    cadexomer iodine (IODOSORB) 0.9 % gel Apply topically once daily at 6am. Apply to wound daily 40 g 0    fluticasone furoate-vilanteroL (BREO ELLIPTA) 100-25 mcg/dose diskus inhaler Inhale 1 puff into the lungs daily as needed.      FLUTICASONE PROPIONATE NASL 1 spray by Nasal route once daily.      folic acid (FOLVITE) 1 MG tablet Take 1 mg by  "mouth once daily.      furosemide (LASIX) 40 MG tablet TAKE TWO TABLETS BY MOUTH IN THE MORNING AND ONE IN THE EVENING 90 tablet 1    furosemide (LASIX) 40 MG tablet Take 2 tablets (80 mg total) by mouth every morning AND 1 tablet (40 mg total) every evening. 90 tablet 1    hydrALAZINE (APRESOLINE) 25 MG tablet TAKE ONE TABLET BY MOUTH THREE TIMES DAILY 90 tablet 1    insulin asp prt-insulin aspart, NovoLOG 70/30, (NOVOLOG 70/30) 100 unit/mL (70-30) Soln Inject 15 Units into the skin once daily. 15 units in am and 10 units in pm 10 mL 2    insulin NPH-insulin regular, 70/30, (NOVOLIN 70/30 U-100 INSULIN) 100 unit/mL (70-30) injection INJECT 40 UNITS UNDER SKIN IN THE MORNING AND 15 IN THE EVENING. 50 mL 6    insulin syringe-needle U-100 1 mL 31 gauge x 5/16 Syrg Check blood glucose 2 times a day as needed. 200 each 3    insulin syringe-needle U-100 1/2 mL 31 gauge x 15/64" Syrg by Misc.(Non-Drug; Combo Route) route. Use as directed with insulin      latanoprost 0.005 % ophthalmic solution Place 1 drop into both eyes once daily.      loratadine (CLARITIN) 10 mg tablet Take 1 tablet (10 mg total) by mouth once daily. 90 tablet 1    losartan (COZAAR) 100 MG tablet Take 1 tablet (100 mg total) by mouth once daily. 30 tablet 0    methotrexate 2.5 MG Tab Take 2.5 mg by mouth every 7 days. 5 tablets q7 days      metoprolol tartrate (LOPRESSOR) 25 MG tablet Take 1 tablet (25 mg total) by mouth 3 (three) times daily. 90 tablet 3    multivitamin/iron/folic acid (CENTRUM ULTRA WOMEN'S ORAL) Take 1 tablet by mouth once daily.      naproxen (NAPROSYN) 500 MG tablet Take 500 mg by mouth 2 (two) times daily as needed.       omeprazole (PRILOSEC) 20 MG capsule Take 1 capsule (20 mg total) by mouth once daily. 90 capsule 1    ONETOUCH ULTRA BLUE TEST STRIP Strp 1 strip by abdominal subcutaneous route 2 (two) times a day. Check glucose twice a day 200 strip 5    pantoprazole (PROTONIX) 40 MG tablet Take 1 tablet (40 " mg total) by mouth once daily. 60 tablet 6    potassium chloride SA (K-DUR,KLOR-CON) 10 MEQ tablet Take 1 tablet (10 mEq total) by mouth 3 (three) times daily. 90 tablet 1    predniSONE (DELTASONE) 5 MG tablet Take 5 mg by mouth daily as needed.       pregabalin (LYRICA) 50 MG capsule Take 1 capsule by mouth 3 (three) times daily.      torsemide (DEMADEX) 20 MG Tab Take 2 tablets (40 mg total) by mouth once daily. 60 tablet 3    triamcinolone acetonide 0.1% (KENALOG) 0.1 % cream Apply topically 2 (two) times daily. Apply to affected area twice a day as needed 454 g 1    venlafaxine (EFFEXOR-XR) 75 MG 24 hr capsule TAKE ONE CAPSULE BY MOUTH EVERY OTHER DAY 45 capsule 1    zolpidem (AMBIEN) 5 MG Tab Take 5 mg by mouth nightly as needed.         Allergies  Review of patient's allergies indicates:  No Known Allergies    Physical Examination     Vitals:    04/12/22 1457   BP: 139/77   Pulse: 74   Resp: 20   Temp: 97 °F (36.1 °C)     Physical Exam  Vitals and nursing note reviewed.   Constitutional:       Appearance: Normal appearance.   HENT:      Head: Normocephalic.   Cardiovascular:      Rate and Rhythm: Normal rate and regular rhythm.      Pulses: Normal pulses.      Heart sounds: Normal heart sounds.   Pulmonary:      Effort: Pulmonary effort is normal. No respiratory distress.   Chest:      Chest wall: No tenderness.   Musculoskeletal:         General: Swelling present.      Right lower leg: Edema present.   Skin:     General: Skin is warm and dry.      Capillary Refill: Capillary refill takes 2 to 3 seconds.      Comments: See wound assessment    Neurological:      General: No focal deficit present.      Mental Status: She is alert and oriented to person, place, and time. Mental status is at baseline.   Psychiatric:         Mood and Affect: Mood normal.         Behavior: Behavior normal.         Thought Content: Thought content normal.         Judgment: Judgment normal.         Assessment and Plan      1.  Non-pressure chronic ulcer of other part of right lower leg with fat layer exposed    2. Venous ulcer of right leg    3. PVD (peripheral vascular disease)           Wound 09/27/21 Right lower;lateral Leg #3 (Active)   09/27/21     Pre-existing:    Primary Wound Type:    Side: Right   Orientation: lower;lateral   Location: Leg   Wound Number: #3   Ankle-Brachial Index:    Pulses:    Removal Indication and Assessment:    Wound Outcome:    (Retired) Wound Type:    (Retired) Wound Length (cm):    (Retired) Wound Width (cm):    (Retired) Depth (cm):    Wound Description (Comments):    Removal Indications:    Wound Image    04/12/22 1458   Dressing Appearance Dry;Intact;Clean 04/12/22 1458   Drainage Amount Moderate 04/12/22 1458   Drainage Characteristics/Odor Yellow;Bleeding controlled 04/12/22 1458   Appearance Intact;Pink;Red;Granulating;Epithelialization 04/12/22 1458   Tissue loss description Full thickness 04/12/22 1458   Black (%), Wound Tissue Color 0 % 04/12/22 1458   Red (%), Wound Tissue Color 100 % 04/12/22 1458   Yellow (%), Wound Tissue Color 0 % 04/12/22 1458   Periwound Area Intact 04/12/22 1458   Wound Edges Approximated 04/12/22 1458   Wound Length (cm) 2.9 cm 04/12/22 1458   Wound Width (cm) 6.2 cm 04/12/22 1458   Wound Depth (cm) 0.2 cm 04/12/22 1458   Wound Volume (cm^3) 3.596 cm^3 04/12/22 1458   Wound Surface Area (cm^2) 17.98 cm^2 04/12/22 1458   Care Cleansed with:;Antimicrobial agent 04/12/22 1458   Dressing Applied;Silver;Calcium alginate;Gauze;Rolled gauze 04/12/22 1458   Periwound Care Moisture barrier applied 04/12/22 1458         Active Problem List with Overview Notes    Diagnosis Date Noted    Venous insufficiency of both lower extremities 10/20/2021     With a ulcer on her RLE x 2 months;   Being seen by wound care      Venous stasis ulcer 09/27/2021     infected.      PVD (peripheral vascular disease) 09/27/2021    COVID-19 08/06/2021    COVID-19 virus infection 08/03/2021     Acute on chronic systolic congestive heart failure 08/03/2021    Essential hypertension 08/03/2021    Diabetes mellitus, type 2 08/03/2021     Continue Accucheck AC&HS  Continue diabetic diet  Continune home diabetic medication      S/P 2-vessel coronary artery bypass 05/10/2021     7/2004 Dr. Leena TRIVEDI top the LAD and SVG to the OM1      COPD (chronic obstructive pulmonary disease)      Requires continuous supplemental oxygen      HHD (hypertensive heart disease)     MI (myocardial infarction)      7/2004      Coronary artery disease      CABG 2004      Cardiomyopathy      EF 35% by LVgram 7/17/2020  35-40 % by echo 3/9/2020      Pulmonary hypertension      Severe by echo 3/9/2020      Rheumatoid arthritis       Plan:   Clean with baby shampoo and water  Apply Iodosorb gel to wound bed then cover   Cover with dry gauze, ABD, wrap with kane, ace wrap from toes to just below knee  Change dressing every other day and as needed  If wrap slides down re wrap and if becomes to tight remove  Elevate legs as much as possible on pillows when sitting and lying down  No prolonged standing  Medication sent to pharmacy   Diabetes:  Monitor glucose closely. Check fasting glucose and 2 hours after meals. HgA1C goal <7, fasting glucose , and 2 hours after meals <180  Hypertension:  Check blood pressure twice daily, goal <120/80  Diet:   Increase protein intake, avoid fried, fatty foods and foods high in simple carbs.   Vitamins:  Take vitamin C 1000 mg, zinc 50mg, vitamin d 5000 units, and a daily multivitamin. Ernie is a good source of protein and nutrients to aid in wound healing.   Monitor closely for s/s of infection including fever, chills, increase in pain, odor from wound, and increased redness from foot. Go to ER if any complications develop.   Keep leg elevated and avoid pressure on wound.      Problem List Items Addressed This Visit        Cardiac/Vascular    PVD (peripheral vascular disease)       Other Visit Diagnoses     Non-pressure chronic ulcer of other part of right lower leg with fat layer exposed    -  Primary    Venous ulcer of right leg              Future Appointments   Date Time Provider Department Center   4/20/2022  1:45 PM Fco Chaparro MD [facility]   4/26/2022 10:00 AM MYA Escobar [facility]    4/26/2022  1:00 PM CRISTINA Smith [facility]   11/8/2022  1:30 PM [facility] MAMMO[facility]            Signature:  MYA Escobar  [facility] - WOUND CARE  [address]  [place] [address]  [phone]    Date of encounter: 4/12/22

## 2022-04-20 ENCOUNTER — OFFICE VISIT (OUTPATIENT)
Dept: FAMILY MEDICINE | Facility: CLINIC | Age: 77
End: 2022-04-20
Payer: COMMERCIAL

## 2022-04-20 VITALS
RESPIRATION RATE: 18 BRPM | BODY MASS INDEX: 33.02 KG/M2 | OXYGEN SATURATION: 94 % | TEMPERATURE: 97 F | WEIGHT: 210.38 LBS | HEART RATE: 78 BPM | DIASTOLIC BLOOD PRESSURE: 68 MMHG | HEIGHT: 67 IN | SYSTOLIC BLOOD PRESSURE: 132 MMHG

## 2022-04-20 DIAGNOSIS — L30.9 DERMATITIS: ICD-10-CM

## 2022-04-20 DIAGNOSIS — E78.49 OTHER HYPERLIPIDEMIA: ICD-10-CM

## 2022-04-20 DIAGNOSIS — I83.008 VENOUS STASIS ULCER OF OTHER PART OF LOWER LEG, UNSPECIFIED LATERALITY, UNSPECIFIED ULCER STAGE, UNSPECIFIED WHETHER VARICOSE VEINS PRESENT: ICD-10-CM

## 2022-04-20 DIAGNOSIS — I10 ESSENTIAL HYPERTENSION: Primary | ICD-10-CM

## 2022-04-20 DIAGNOSIS — L97.809 VENOUS STASIS ULCER OF OTHER PART OF LOWER LEG, UNSPECIFIED LATERALITY, UNSPECIFIED ULCER STAGE, UNSPECIFIED WHETHER VARICOSE VEINS PRESENT: ICD-10-CM

## 2022-04-20 DIAGNOSIS — E08.00 DIABETES MELLITUS DUE TO UNDERLYING CONDITION WITH HYPEROSMOLARITY WITHOUT COMA, WITHOUT LONG-TERM CURRENT USE OF INSULIN: ICD-10-CM

## 2022-04-20 DIAGNOSIS — J30.1 SEASONAL ALLERGIC RHINITIS DUE TO POLLEN: ICD-10-CM

## 2022-04-20 PROCEDURE — 1160F PR REVIEW ALL MEDS BY PRESCRIBER/CLIN PHARMACIST DOCUMENTED: ICD-10-PCS | Mod: ,,, | Performed by: INTERNAL MEDICINE

## 2022-04-20 PROCEDURE — 3288F PR FALLS RISK ASSESSMENT DOCUMENTED: ICD-10-PCS | Mod: ,,, | Performed by: INTERNAL MEDICINE

## 2022-04-20 PROCEDURE — 4010F ACE/ARB THERAPY RXD/TAKEN: CPT | Mod: ,,, | Performed by: INTERNAL MEDICINE

## 2022-04-20 PROCEDURE — 3078F PR MOST RECENT DIASTOLIC BLOOD PRESSURE < 80 MM HG: ICD-10-PCS | Mod: ,,, | Performed by: INTERNAL MEDICINE

## 2022-04-20 PROCEDURE — 3288F FALL RISK ASSESSMENT DOCD: CPT | Mod: ,,, | Performed by: INTERNAL MEDICINE

## 2022-04-20 PROCEDURE — 1126F PR PAIN SEVERITY QUANTIFIED, NO PAIN PRESENT: ICD-10-PCS | Mod: ,,, | Performed by: INTERNAL MEDICINE

## 2022-04-20 PROCEDURE — 1101F PR PT FALLS ASSESS DOC 0-1 FALLS W/OUT INJ PAST YR: ICD-10-PCS | Mod: ,,, | Performed by: INTERNAL MEDICINE

## 2022-04-20 PROCEDURE — 1160F RVW MEDS BY RX/DR IN RCRD: CPT | Mod: ,,, | Performed by: INTERNAL MEDICINE

## 2022-04-20 PROCEDURE — 1159F MED LIST DOCD IN RCRD: CPT | Mod: ,,, | Performed by: INTERNAL MEDICINE

## 2022-04-20 PROCEDURE — 4010F PR ACE/ARB THEARPY RXD/TAKEN: ICD-10-PCS | Mod: ,,, | Performed by: INTERNAL MEDICINE

## 2022-04-20 PROCEDURE — 99214 PR OFFICE/OUTPT VISIT, EST, LEVL IV, 30-39 MIN: ICD-10-PCS | Mod: ,,, | Performed by: INTERNAL MEDICINE

## 2022-04-20 PROCEDURE — 3008F BODY MASS INDEX DOCD: CPT | Mod: ,,, | Performed by: INTERNAL MEDICINE

## 2022-04-20 PROCEDURE — 1159F PR MEDICATION LIST DOCUMENTED IN MEDICAL RECORD: ICD-10-PCS | Mod: ,,, | Performed by: INTERNAL MEDICINE

## 2022-04-20 PROCEDURE — 1126F AMNT PAIN NOTED NONE PRSNT: CPT | Mod: ,,, | Performed by: INTERNAL MEDICINE

## 2022-04-20 PROCEDURE — 3078F DIAST BP <80 MM HG: CPT | Mod: ,,, | Performed by: INTERNAL MEDICINE

## 2022-04-20 PROCEDURE — 1101F PT FALLS ASSESS-DOCD LE1/YR: CPT | Mod: ,,, | Performed by: INTERNAL MEDICINE

## 2022-04-20 PROCEDURE — 3075F PR MOST RECENT SYSTOLIC BLOOD PRESS GE 130-139MM HG: ICD-10-PCS | Mod: ,,, | Performed by: INTERNAL MEDICINE

## 2022-04-20 PROCEDURE — 3075F SYST BP GE 130 - 139MM HG: CPT | Mod: ,,, | Performed by: INTERNAL MEDICINE

## 2022-04-20 PROCEDURE — 99214 OFFICE O/P EST MOD 30 MIN: CPT | Mod: ,,, | Performed by: INTERNAL MEDICINE

## 2022-04-20 PROCEDURE — 3008F PR BODY MASS INDEX (BMI) DOCUMENTED: ICD-10-PCS | Mod: ,,, | Performed by: INTERNAL MEDICINE

## 2022-04-20 RX ORDER — BRIMONIDINE TARTRATE 2 MG/ML
1 SOLUTION/ DROPS OPHTHALMIC 2 TIMES DAILY
COMMUNITY
Start: 2022-03-03

## 2022-04-20 NOTE — PROGRESS NOTES
Subjective:       Patient ID: Delma Rae is a 76 y.o. female.    Chief Complaint: Hypertension, Diabetes, Hyperlipidemia, Medication Refill, and Congestive Heart Failure    Patient is here today for a follow up evaluation. Patient has no new complaints. Patient currently has a wound. Patient states she does follow with Wound Center. Patient is requesting medication refills. Will refill medications today. Will follow in 2 months.       Current Medications:    Current Outpatient Medications:     amLODIPine (NORVASC) 5 MG tablet, Take 1 tablet (5 mg total) by mouth once daily., Disp: 90 tablet, Rfl: 1    aspirin (ECOTRIN) 81 MG EC tablet, Take 81 mg by mouth once daily., Disp: , Rfl:     atorvastatin (LIPITOR) 40 MG tablet, Take 1 tablet (40 mg total) by mouth once daily., Disp: 90 tablet, Rfl: 1    brimonidine 0.2% (ALPHAGAN) 0.2 % Drop, Place 1 drop into both eyes 2 (two) times daily., Disp: , Rfl:     cadexomer iodine (IODOSORB) 0.9 % gel, Apply topically once daily at 6am. Apply to wound daily, Disp: 40 g, Rfl: 0    folic acid (FOLVITE) 1 MG tablet, Take 1 mg by mouth once daily., Disp: , Rfl:     furosemide (LASIX) 40 MG tablet, Take 2 tablets (80 mg total) by mouth every morning AND 1 tablet (40 mg total) every evening., Disp: 90 tablet, Rfl: 1    hydrALAZINE (APRESOLINE) 25 MG tablet, TAKE ONE TABLET BY MOUTH THREE TIMES DAILY, Disp: 90 tablet, Rfl: 1    insulin asp prt-insulin aspart, NovoLOG 70/30, (NOVOLOG 70/30) 100 unit/mL (70-30) Soln, Inject 15 Units into the skin once daily. 15 units in am and 10 units in pm, Disp: 10 mL, Rfl: 2    insulin NPH-insulin regular, 70/30, (NOVOLIN 70/30 U-100 INSULIN) 100 unit/mL (70-30) injection, INJECT 40 UNITS UNDER SKIN IN THE MORNING AND 15 IN THE EVENING., Disp: 50 mL, Rfl: 6    insulin syringe-needle U-100 1 mL 31 gauge x 5/16 Syrg, Check blood glucose 2 times a day as needed., Disp: 200 each, Rfl: 3    insulin syringe-needle U-100 1/2 mL 31 gauge x  "15/64" Aryan, by Misc.(Non-Drug; Combo Route) route. Use as directed with insulin, Disp: , Rfl:     latanoprost 0.005 % ophthalmic solution, Place 1 drop into both eyes once daily., Disp: , Rfl:     loratadine (CLARITIN) 10 mg tablet, Take 1 tablet (10 mg total) by mouth once daily., Disp: 90 tablet, Rfl: 1    losartan (COZAAR) 100 MG tablet, Take 1 tablet (100 mg total) by mouth once daily., Disp: 30 tablet, Rfl: 0    metoprolol tartrate (LOPRESSOR) 25 MG tablet, Take 1 tablet (25 mg total) by mouth 3 (three) times daily., Disp: 90 tablet, Rfl: 3    multivitamin/iron/folic acid (CENTRUM ULTRA WOMEN'S ORAL), Take 1 tablet by mouth once daily., Disp: , Rfl:     naproxen (NAPROSYN) 500 MG tablet, Take 500 mg by mouth 2 (two) times daily as needed. , Disp: , Rfl:     omeprazole (PRILOSEC) 20 MG capsule, Take 1 capsule (20 mg total) by mouth once daily., Disp: 90 capsule, Rfl: 1    ONETOUCH ULTRA BLUE TEST STRIP Strp, 1 strip by abdominal subcutaneous route 2 (two) times a day. Check glucose twice a day, Disp: 200 strip, Rfl: 5    pantoprazole (PROTONIX) 40 MG tablet, Take 1 tablet (40 mg total) by mouth once daily., Disp: 60 tablet, Rfl: 6    potassium chloride SA (K-DUR,KLOR-CON) 10 MEQ tablet, Take 1 tablet (10 mEq total) by mouth 3 (three) times daily., Disp: 90 tablet, Rfl: 1    predniSONE (DELTASONE) 5 MG tablet, Take 5 mg by mouth daily as needed. , Disp: , Rfl:     torsemide (DEMADEX) 20 MG Tab, Take 2 tablets (40 mg total) by mouth once daily., Disp: 60 tablet, Rfl: 3    triamcinolone acetonide 0.1% (KENALOG) 0.1 % cream, Apply topically 2 (two) times daily. Apply to affected area twice a day as needed, Disp: 454 g, Rfl: 1    venlafaxine (EFFEXOR-XR) 75 MG 24 hr capsule, TAKE ONE CAPSULE BY MOUTH EVERY OTHER DAY, Disp: 45 capsule, Rfl: 1    zolpidem (AMBIEN) 5 MG Tab, Take 5 mg by mouth nightly as needed., Disp: , Rfl:     albuterol (PROVENTIL/VENTOLIN HFA) 90 mcg/actuation inhaler, Inhale 2 " puffs into the lungs every 4 (four) hours as needed for Wheezing., Disp: , Rfl:     amitriptyline (ELAVIL) 10 MG tablet, Take 1 tablet by mouth every evening., Disp: , Rfl:     fluticasone furoate-vilanteroL (BREO ELLIPTA) 100-25 mcg/dose diskus inhaler, Inhale 1 puff into the lungs daily as needed., Disp: , Rfl:     FLUTICASONE PROPIONATE NASL, 1 spray by Nasal route once daily., Disp: , Rfl:     methotrexate 2.5 MG Tab, Take 2.5 mg by mouth every 7 days. 5 tablets q7 days, Disp: , Rfl:     pregabalin (LYRICA) 50 MG capsule, Take 1 capsule by mouth 3 (three) times daily., Disp: , Rfl:     Last Labs:     No visits with results within 1 Month(s) from this visit.   Latest known visit with results is:   Office Visit on 09/14/2021   Component Date Value    Sodium 09/14/2021 142     Potassium 09/14/2021 3.8     Chloride 09/14/2021 106     CO2 09/14/2021 33 (A)    Anion Gap 09/14/2021 7     Glucose 09/14/2021 50 (A)    BUN 09/14/2021 26 (A)    Creatinine 09/14/2021 1.18 (A)    BUN/Creatinine Ratio 09/14/2021 22 (A)    Calcium 09/14/2021 9.6     eGFR  09/14/2021 58 (A)    ProBNP 09/14/2021 2,025 (A)       Last Imaging:  Mammo Digital Screening Bilat  Addendum: Repeat LCC and LMLO views performed.     No change from the previous study.     IMPRESSION: Bening findings  Narrative: Result:   Mammo Digital Screening Bilat     History:  Patient is 76 y.o. and is seen for a screening mammogram.    Films Compared:  Prior images (if available) were compared.     Findings:  The breasts have scattered areas of fibroglandular density. There is no   evidence of suspicious masses, calcifications, or other abnormal findings.  Impression: Bilateral  There is no mammographic evidence of malignancy.    Need repeat of LCC and LMLO because of motion artifact.    BI-RADS Category:   Overall: 0 - Incomplete: Needs Additional Imaging Evaluation     Recommendation:    Repeat imaging is recommended for technical  reasons.    Your estimated lifetime risk of breast cancer (to age 85) based on   Tyrer-Cuzick risk assessment model is Tyrer-Cuzick: 3.65 %. According to   the American Cancer Society, patients with a lifetime breast cancer risk   of 20% or higher might benefit from supplemental screening tests.         Review of Systems   All other systems reviewed and are negative.        Objective:      Physical Exam  Vitals reviewed.   Constitutional:       Appearance: Normal appearance. She is normal weight.   Cardiovascular:      Rate and Rhythm: Normal rate and regular rhythm.      Pulses: Normal pulses.      Heart sounds: Normal heart sounds.   Pulmonary:      Effort: Pulmonary effort is normal.      Breath sounds: Normal breath sounds.   Abdominal:      General: Abdomen is flat. Bowel sounds are normal.      Palpations: Abdomen is soft.   Musculoskeletal:         General: Normal range of motion.      Cervical back: Normal range of motion and neck supple.   Skin:     General: Skin is warm and dry.   Neurological:      General: No focal deficit present.      Mental Status: She is alert and oriented to person, place, and time. Mental status is at baseline.         Assessment:       1. Essential hypertension     2. Diabetes mellitus due to underlying condition with hyperosmolarity without coma, without long-term current use of insulin     3. Seasonal allergic rhinitis due to pollen     4. Dermatitis Active    5. Other hyperlipidemia     6. Venous stasis ulcer of other part of lower leg, unspecified laterality, unspecified ulcer stage, unspecified whether varicose veins present          Plan:         Delma was seen today for hypertension, diabetes, hyperlipidemia, medication refill and congestive heart failure.    Diagnoses and all orders for this visit:    Essential hypertension    Diabetes mellitus due to underlying condition with hyperosmolarity without coma, without long-term current use of insulin  The following orders have  not been finalized:  -     blood sugar diagnostic (ONETOUCH ULTRA BLUE TEST STRIP) Strp    Seasonal allergic rhinitis due to pollen  The following orders have not been finalized:  -     loratadine (CLARITIN) 10 mg tablet    Dermatitis  The following orders have not been finalized:  -     triamcinolone acetonide 0.1% (KENALOG) 0.1 % cream    Other hyperlipidemia    Venous stasis ulcer of other part of lower leg, unspecified laterality, unspecified ulcer stage, unspecified whether varicose veins present    Other orders  The following orders have not been finalized:  -     atorvastatin (LIPITOR) 40 MG tablet  -     hydrALAZINE (APRESOLINE) 25 MG tablet  -     insulin asp prt-insulin aspart, NovoLOG 70/30, (NOVOLOG 70/30) 100 unit/mL (70-30) Soln  -     insulin NPH-insulin regular, 70/30, (NOVOLIN 70/30 U-100 INSULIN) 100 unit/mL (70-30) injection  -     insulin syringe-needle U-100 1 mL 31 gauge x 5/16 Syrg  -     losartan (COZAAR) 100 MG tablet  -     venlafaxine (EFFEXOR-XR) 75 MG 24 hr capsule

## 2022-04-20 NOTE — PATIENT INSTRUCTIONS
Delma was seen today for hypertension, diabetes, hyperlipidemia, medication refill and congestive heart failure.    Diagnoses and all orders for this visit:    Essential hypertension    Diabetes mellitus due to underlying condition with hyperosmolarity without coma, without long-term current use of insulin  The following orders have not been finalized:  -     blood sugar diagnostic (ONETOUCH ULTRA BLUE TEST STRIP) Strp    Seasonal allergic rhinitis due to pollen  The following orders have not been finalized:  -     loratadine (CLARITIN) 10 mg tablet    Dermatitis  The following orders have not been finalized:  -     triamcinolone acetonide 0.1% (KENALOG) 0.1 % cream    Other hyperlipidemia    Venous stasis ulcer of other part of lower leg, unspecified laterality, unspecified ulcer stage, unspecified whether varicose veins present    Other orders  The following orders have not been finalized:  -     atorvastatin (LIPITOR) 40 MG tablet  -     hydrALAZINE (APRESOLINE) 25 MG tablet  -     insulin asp prt-insulin aspart, NovoLOG 70/30, (NOVOLOG 70/30) 100 unit/mL (70-30) Soln  -     insulin NPH-insulin regular, 70/30, (NOVOLIN 70/30 U-100 INSULIN) 100 unit/mL (70-30) injection  -     insulin syringe-needle U-100 1 mL 31 gauge x 5/16 Syrg  -     losartan (COZAAR) 100 MG tablet  -     venlafaxine (EFFEXOR-XR) 75 MG 24 hr capsule

## 2022-04-21 ENCOUNTER — DOCUMENTATION ONLY (OUTPATIENT)
Dept: FAMILY MEDICINE | Facility: CLINIC | Age: 77
End: 2022-04-21
Payer: COMMERCIAL

## 2022-04-21 RX ORDER — VENLAFAXINE HYDROCHLORIDE 75 MG/1
75 CAPSULE, EXTENDED RELEASE ORAL EVERY OTHER DAY
Qty: 45 CAPSULE | Refills: 1 | Status: SHIPPED | OUTPATIENT
Start: 2022-04-21 | End: 2022-11-01

## 2022-04-21 RX ORDER — LORATADINE 10 MG/1
10 TABLET ORAL DAILY
Qty: 90 TABLET | Refills: 1 | Status: SHIPPED | OUTPATIENT
Start: 2022-04-21 | End: 2022-11-02 | Stop reason: SDUPTHER

## 2022-04-21 RX ORDER — ATORVASTATIN CALCIUM 40 MG/1
40 TABLET, FILM COATED ORAL DAILY
Qty: 90 TABLET | Refills: 1 | Status: SHIPPED | OUTPATIENT
Start: 2022-04-21 | End: 2022-11-02 | Stop reason: SDUPTHER

## 2022-04-21 RX ORDER — INSULIN ASPART 100 [IU]/ML
15 INJECTION, SUSPENSION SUBCUTANEOUS DAILY
Qty: 10 ML | Refills: 2 | Status: SHIPPED | OUTPATIENT
Start: 2022-04-21 | End: 2022-05-05

## 2022-04-21 RX ORDER — TRIAMCINOLONE ACETONIDE 1 MG/G
CREAM TOPICAL 2 TIMES DAILY
Qty: 454 G | Refills: 1 | Status: SHIPPED | OUTPATIENT
Start: 2022-04-21 | End: 2022-09-21 | Stop reason: SDUPTHER

## 2022-04-21 RX ORDER — SYRINGE,SAFETY WITH NEEDLE,1ML 25GX1"
SYRINGE (EA) MISCELLANEOUS
Qty: 200 EACH | Refills: 3 | Status: SHIPPED | OUTPATIENT
Start: 2022-04-21 | End: 2022-11-02 | Stop reason: SDUPTHER

## 2022-04-21 RX ORDER — LOSARTAN POTASSIUM 100 MG/1
100 TABLET ORAL DAILY
Qty: 30 TABLET | Refills: 0 | Status: SHIPPED | OUTPATIENT
Start: 2022-04-21 | End: 2022-07-06 | Stop reason: SDUPTHER

## 2022-04-21 RX ORDER — HYDRALAZINE HYDROCHLORIDE 25 MG/1
25 TABLET, FILM COATED ORAL 3 TIMES DAILY
Qty: 90 TABLET | Refills: 1 | Status: SHIPPED | OUTPATIENT
Start: 2022-04-21 | End: 2022-04-26 | Stop reason: SDUPTHER

## 2022-04-21 NOTE — PROGRESS NOTES
Rx for Kenalog cream printed instead of sending thru escribe. Rx called in to [facility].

## 2022-04-26 ENCOUNTER — OFFICE VISIT (OUTPATIENT)
Dept: CARDIOLOGY | Facility: CLINIC | Age: 77
End: 2022-04-26
Payer: COMMERCIAL

## 2022-04-26 VITALS
BODY MASS INDEX: 33.74 KG/M2 | HEART RATE: 84 BPM | OXYGEN SATURATION: 92 % | HEIGHT: 67 IN | SYSTOLIC BLOOD PRESSURE: 142 MMHG | DIASTOLIC BLOOD PRESSURE: 70 MMHG | WEIGHT: 215 LBS

## 2022-04-26 DIAGNOSIS — I25.10 CORONARY ARTERY DISEASE, UNSPECIFIED VESSEL OR LESION TYPE, UNSPECIFIED WHETHER ANGINA PRESENT, UNSPECIFIED WHETHER NATIVE OR TRANSPLANTED HEART: Primary | ICD-10-CM

## 2022-04-26 DIAGNOSIS — I10 HYPERTENSION, UNSPECIFIED TYPE: ICD-10-CM

## 2022-04-26 PROCEDURE — 99215 OFFICE O/P EST HI 40 MIN: CPT | Mod: PBBFAC | Performed by: NURSE PRACTITIONER

## 2022-04-26 PROCEDURE — 3078F PR MOST RECENT DIASTOLIC BLOOD PRESSURE < 80 MM HG: ICD-10-PCS | Mod: CPTII,,, | Performed by: NURSE PRACTITIONER

## 2022-04-26 PROCEDURE — 3077F SYST BP >= 140 MM HG: CPT | Mod: CPTII,,, | Performed by: NURSE PRACTITIONER

## 2022-04-26 PROCEDURE — 1160F RVW MEDS BY RX/DR IN RCRD: CPT | Mod: CPTII,,, | Performed by: NURSE PRACTITIONER

## 2022-04-26 PROCEDURE — 3008F PR BODY MASS INDEX (BMI) DOCUMENTED: ICD-10-PCS | Mod: CPTII,,, | Performed by: NURSE PRACTITIONER

## 2022-04-26 PROCEDURE — 1159F PR MEDICATION LIST DOCUMENTED IN MEDICAL RECORD: ICD-10-PCS | Mod: CPTII,,, | Performed by: NURSE PRACTITIONER

## 2022-04-26 PROCEDURE — 3008F BODY MASS INDEX DOCD: CPT | Mod: CPTII,,, | Performed by: NURSE PRACTITIONER

## 2022-04-26 PROCEDURE — 99214 PR OFFICE/OUTPT VISIT, EST, LEVL IV, 30-39 MIN: ICD-10-PCS | Mod: S$PBB,,, | Performed by: NURSE PRACTITIONER

## 2022-04-26 PROCEDURE — 99214 OFFICE O/P EST MOD 30 MIN: CPT | Mod: S$PBB,,, | Performed by: NURSE PRACTITIONER

## 2022-04-26 PROCEDURE — 93005 ELECTROCARDIOGRAM TRACING: CPT | Mod: PBBFAC | Performed by: INTERNAL MEDICINE

## 2022-04-26 PROCEDURE — 93010 EKG 12-LEAD: ICD-10-PCS | Mod: S$PBB,,, | Performed by: INTERNAL MEDICINE

## 2022-04-26 PROCEDURE — 4010F ACE/ARB THERAPY RXD/TAKEN: CPT | Mod: CPTII,,, | Performed by: NURSE PRACTITIONER

## 2022-04-26 PROCEDURE — 3077F PR MOST RECENT SYSTOLIC BLOOD PRESSURE >= 140 MM HG: ICD-10-PCS | Mod: CPTII,,, | Performed by: NURSE PRACTITIONER

## 2022-04-26 PROCEDURE — 3078F DIAST BP <80 MM HG: CPT | Mod: CPTII,,, | Performed by: NURSE PRACTITIONER

## 2022-04-26 PROCEDURE — 4010F PR ACE/ARB THEARPY RXD/TAKEN: ICD-10-PCS | Mod: CPTII,,, | Performed by: NURSE PRACTITIONER

## 2022-04-26 PROCEDURE — 1160F PR REVIEW ALL MEDS BY PRESCRIBER/CLIN PHARMACIST DOCUMENTED: ICD-10-PCS | Mod: CPTII,,, | Performed by: NURSE PRACTITIONER

## 2022-04-26 PROCEDURE — 93010 ELECTROCARDIOGRAM REPORT: CPT | Mod: S$PBB,,, | Performed by: INTERNAL MEDICINE

## 2022-04-26 PROCEDURE — 1159F MED LIST DOCD IN RCRD: CPT | Mod: CPTII,,, | Performed by: NURSE PRACTITIONER

## 2022-04-26 RX ORDER — MELOXICAM 7.5 MG/1
7.5 TABLET ORAL DAILY
COMMUNITY
End: 2022-10-11 | Stop reason: SDUPTHER

## 2022-04-26 RX ORDER — HYDRALAZINE HYDROCHLORIDE 25 MG/1
25 TABLET, FILM COATED ORAL 3 TIMES DAILY
Qty: 270 TABLET | Refills: 1 | Status: SHIPPED | OUTPATIENT
Start: 2022-04-26 | End: 2022-07-06 | Stop reason: SDUPTHER

## 2022-04-26 NOTE — PROGRESS NOTES
[facility] note        DATE OF SERVICE: 04/26/2022       PCP: Fco Chaparro MD      CHIEF COMPLAINT:   Chief Complaint   Patient presents with    Follow-up     Patient denies any cardiac complaints today.        HISTORY OF PRESENT ILLNESS:  Delma Rae is a 76 y.o. 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 Chaidez    GERD (gastroesophageal reflux disease)     HHD (hypertensive heart disease)     Hyperlipidemia     Hypertension     MI (myocardial infarction)     OA (osteoarthritis)     Rheumatoid arthritis      who presents for routine follow up.   Chief Complaint   Patient presents with    Follow-up     Patient denies any cardiac complaints today.            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 Chaidez    GERD (gastroesophageal reflux disease)     HHD (hypertensive heart disease)     Hyperlipidemia     Hypertension     MI (myocardial infarction)     OA (osteoarthritis)     Rheumatoid arthritis        PAST SURGICAL HISTORY:  Past Surgical History:   Procedure Laterality Date    HYSTERECTOMY      OOPHORECTOMY      VAGINAL DELIVERY      x 1       SOCIAL HISTORY:  Social History     Socioeconomic History    Marital status:    Tobacco Use    Smoking status: Never Smoker    Smokeless tobacco: Never Used   Substance and Sexual Activity     Alcohol use: Never    Drug use: Never    Sexual activity: Not Currently     Partners: Male       FAMILY HISTORY:  Family History   Problem Relation Age of Onset    Diabetes Mother         age 60    Hypertension Mother     Diabetes Father     Prostate cancer Father     Cancer Father         Prostate, age 85    Coronary artery disease Sister         age 72    Cancer Brother     Coronary artery disease Brother     Hypertension Son         age 50         ALLERGIES:  Review of patient's allergies indicates:  No Known Allergies     MEDICATIONS:    Current Outpatient Medications:     amLODIPine (NORVASC) 5 MG tablet, Take 1 tablet (5 mg total) by mouth once daily., Disp: 90 tablet, Rfl: 1    aspirin (ECOTRIN) 81 MG EC tablet, Take 81 mg by mouth once daily., Disp: , Rfl:     atorvastatin (LIPITOR) 40 MG tablet, Take 1 tablet (40 mg total) by mouth once daily., Disp: 90 tablet, Rfl: 1    blood sugar diagnostic (ONETOUCH ULTRA BLUE TEST STRIP) Strp, 1 strip by abdominal subcutaneous route 2 (two) times a day. Check glucose twice a day, Disp: 200 strip, Rfl: 5    brimonidine 0.2% (ALPHAGAN) 0.2 % Drop, Place 1 drop into both eyes 2 (two) times daily., Disp: , Rfl:     cadexomer iodine (IODOSORB) 0.9 % gel, Apply topically once daily at 6am. Apply to wound daily, Disp: 40 g, Rfl: 0    folic acid (FOLVITE) 1 MG tablet, Take 1 mg by mouth once daily., Disp: , Rfl:     insulin asp prt-insulin aspart, NovoLOG 70/30, (NOVOLOG 70/30) 100 unit/mL (70-30) Soln, Inject 15 Units into the skin once daily. 15 units in am and 10 units in pm, Disp: 10 mL, Rfl: 2    insulin NPH-insulin regular, 70/30, (NOVOLIN 70/30 U-100 INSULIN) 100 unit/mL (70-30) injection, INJECT 40 UNITS UNDER SKIN IN THE MORNING AND 15 IN THE EVENING., Disp: 50 mL, Rfl: 6    insulin syringe-needle U-100 1 mL 31 gauge x 5/16 Syrg, Check blood glucose 4 times a day as needed., Disp: 200 each, Rfl: 3    insulin syringe-needle U-100 1/2 mL 31  "gauge x 15/64" Syrg, by Misc.(Non-Drug; Combo Route) route. Use as directed with insulin, Disp: , Rfl:     latanoprost 0.005 % ophthalmic solution, Place 1 drop into both eyes once daily., Disp: , Rfl:     loratadine (CLARITIN) 10 mg tablet, Take 1 tablet (10 mg total) by mouth once daily., Disp: 90 tablet, Rfl: 1    losartan (COZAAR) 100 MG tablet, Take 1 tablet (100 mg total) by mouth once daily., Disp: 30 tablet, Rfl: 0    methotrexate 2.5 MG Tab, Take 2.5 mg by mouth every 7 days. 5 tablets q7 days, Disp: , Rfl:     metoprolol tartrate (LOPRESSOR) 25 MG tablet, Take 1 tablet (25 mg total) by mouth 3 (three) times daily., Disp: 90 tablet, Rfl: 3    multivitamin/iron/folic acid (CENTRUM ULTRA WOMEN'S ORAL), Take 1 tablet by mouth once daily., Disp: , Rfl:     naproxen (NAPROSYN) 500 MG tablet, Take 500 mg by mouth 2 (two) times daily as needed. , Disp: , Rfl:     omeprazole (PRILOSEC) 20 MG capsule, Take 1 capsule (20 mg total) by mouth once daily., Disp: 90 capsule, Rfl: 1    pantoprazole (PROTONIX) 40 MG tablet, Take 1 tablet (40 mg total) by mouth once daily., Disp: 60 tablet, Rfl: 6    potassium chloride SA (K-DUR,KLOR-CON) 10 MEQ tablet, Take 1 tablet (10 mEq total) by mouth 3 (three) times daily., Disp: 90 tablet, Rfl: 1    predniSONE (DELTASONE) 5 MG tablet, Take 5 mg by mouth daily as needed. , Disp: , Rfl:     torsemide (DEMADEX) 20 MG Tab, Take 2 tablets (40 mg total) by mouth once daily., Disp: 60 tablet, Rfl: 3    triamcinolone acetonide 0.1% (KENALOG) 0.1 % cream, Apply topically 2 (two) times daily. Apply to affected area twice a day as needed, Disp: 454 g, Rfl: 1    venlafaxine (EFFEXOR-XR) 75 MG 24 hr capsule, Take 1 capsule (75 mg total) by mouth every other day., Disp: 45 capsule, Rfl: 1    zolpidem (AMBIEN) 5 MG Tab, Take 5 mg by mouth nightly as needed., Disp: , Rfl:     albuterol (PROVENTIL/VENTOLIN HFA) 90 mcg/actuation inhaler, Inhale 2 puffs into the lungs every 4 (four) " "hours as needed for Wheezing., Disp: , Rfl:     amitriptyline (ELAVIL) 10 MG tablet, Take 1 tablet by mouth every evening., Disp: , Rfl:     fluticasone furoate-vilanteroL (BREO ELLIPTA) 100-25 mcg/dose diskus inhaler, Inhale 1 puff into the lungs daily as needed., Disp: , Rfl:     FLUTICASONE PROPIONATE NASL, 1 spray by Nasal route once daily., Disp: , Rfl:     hydrALAZINE (APRESOLINE) 25 MG tablet, Take 1 tablet (25 mg total) by mouth 3 (three) times daily., Disp: 270 tablet, Rfl: 1    meloxicam (MOBIC) 7.5 MG tablet, Take 7.5 mg by mouth once daily., Disp: , Rfl:     pregabalin (LYRICA) 50 MG capsule, Take 1 capsule by mouth 3 (three) times daily., Disp: , Rfl:   Medications have been reviewed and reconciled.     PHYSICAL EXAM:  BP (!) 142/70 (BP Location: Left arm, Patient Position: Sitting)   Pulse 84   Ht 5' 7" (1.702 m)   Wt 97.5 kg (215 lb)   SpO2 (!) 92%   BMI 33.67 kg/m²   Wt Readings from Last 3 Encounters:   04/26/22 97.5 kg (215 lb)   04/20/22 95.4 kg (210 lb 6.4 oz)   12/01/21 93.9 kg (207 lb)      Body mass index is 33.67 kg/m².    Physical Exam  Vitals and nursing note reviewed.   Constitutional:       Appearance: Normal appearance. She is obese.      Comments: In a wheelchair   HENT:      Head: Normocephalic and atraumatic.   Eyes:      Pupils: Pupils are equal, round, and reactive to light.   Neck:      Vascular: No carotid bruit.   Cardiovascular:      Rate and Rhythm: Normal rate and regular rhythm.      Pulses: Normal pulses.      Heart sounds: Normal heart sounds.   Pulmonary:      Effort: Pulmonary effort is normal.      Breath sounds: Normal breath sounds.   Abdominal:      General: Bowel sounds are normal.      Palpations: Abdomen is soft.   Musculoskeletal:      Cervical back: Neck supple.      Right lower leg: No edema.      Left lower leg: No edema.   Skin:     General: Skin is warm and dry.      Capillary Refill: Capillary refill takes less than 2 seconds.   Neurological:      " General: No focal deficit present.      Mental Status: She is alert and oriented to person, place, and time.   Psychiatric:         Mood and Affect: Mood normal.         Behavior: Behavior normal.         LABS REVIEWED:  Lab Results   Component Value Date    WBC 9.56 08/06/2021    RBC 5.40 08/06/2021    HGB 13.3 08/06/2021    HCT 40.9 08/06/2021    MCV 75.7 (L) 08/06/2021    MCH 24.6 (L) 08/06/2021    MCHC 32.5 08/06/2021    RDW 15.4 (H) 08/06/2021     08/06/2021    MPV 10.8 08/06/2021    NRBC 0.3 (H) 08/06/2021    INR 1.05 08/04/2021     Lab Results   Component Value Date     09/14/2021    K 3.8 09/14/2021     09/14/2021    CO2 33 (H) 09/14/2021    BUN 26 (H) 09/14/2021    MG 1.8 08/03/2021     Lab Results   Component Value Date    CPK 66 07/15/2020    AST 15 08/10/2021    ALT 25 08/10/2021     Lab Results   Component Value Date    GLU 50 (L) 09/14/2021     No results found for: CHOL, HDL, LDL, TRIG, CHOLHDL    CARDIAC STUDIES REVIEWED:EKG: RSR with HR 80 bpm; LVH with repolarization abnormality;    echocardiogram  Results for orders placed during the hospital encounter of 08/03/21    Echo    Interpretation Summary  · The left ventricle is normal in size with moderately decreased systolic function.  · The estimated ejection fraction is 30%.  · Grade I left ventricular diastolic dysfunction.  · Mild right ventricular enlargement with normal right ventricular systolic function.  · Normal central venous pressure (3 mmHg).  · Mild mitral regurgitation.     Heart cath  No results found for this or any previous visit.           ASSESSMENT:   Patient Active Problem List   Diagnosis    COPD (chronic obstructive pulmonary disease)    HHD (hypertensive heart disease)    MI (myocardial infarction)    Coronary artery disease    Cardiomyopathy    Pulmonary hypertension    Rheumatoid arthritis    S/P 2-vessel coronary artery bypass    COVID-19 virus infection    Acute on chronic systolic congestive  heart failure    Essential hypertension    Diabetes mellitus, type 2    COVID-19    Venous stasis ulcer    PVD (peripheral vascular disease)    Venous insufficiency of both lower extremities            Problem List Items Addressed This Visit        Cardiac/Vascular    Coronary artery disease - Primary    Overview     CABG 2004           Relevant Orders    EKG 12-lead      Other Visit Diagnoses     Hypertension, unspecified type        Relevant Medications    hydrALAZINE (APRESOLINE) 25 MG tablet           PLAN: Patient's son reports that she was taking both lasix and torsemide until Sunday and ran out of lasix on Sunday. She has had no fluid retention since. We will stop the lasix and continue the torsemide. IF she begins having fluid retention her son is to increase the torsemide to bid dosing and call our office for further instructions.   Orders Placed This Encounter   Procedures    EKG 12-lead     Standing Status:   Future     Standing Expiration Date:   4/26/2023      RTC: 6 months

## 2022-05-04 NOTE — TELEPHONE ENCOUNTER
----- Message from Sugar Cazares sent at 5/4/2022 11:43 AM CDT -----  PT called requesting a refill on metoprolol tartrate     [phone]  Krishna moss     Informed son that refill request has been sent to Dr. Chaparro to send to pharmacy.

## 2022-05-05 ENCOUNTER — OFFICE VISIT (OUTPATIENT)
Dept: WOUND CARE | Facility: CLINIC | Age: 77
End: 2022-05-05
Attending: FAMILY MEDICINE
Payer: COMMERCIAL

## 2022-05-05 VITALS
HEART RATE: 108 BPM | SYSTOLIC BLOOD PRESSURE: 175 MMHG | TEMPERATURE: 98 F | DIASTOLIC BLOOD PRESSURE: 75 MMHG | RESPIRATION RATE: 20 BRPM

## 2022-05-05 DIAGNOSIS — L97.812 NON-PRESSURE CHRONIC ULCER OF OTHER PART OF RIGHT LOWER LEG WITH FAT LAYER EXPOSED: ICD-10-CM

## 2022-05-05 DIAGNOSIS — I83.019 VENOUS ULCER OF RIGHT LEG: Primary | ICD-10-CM

## 2022-05-05 DIAGNOSIS — L97.919 VENOUS ULCER OF RIGHT LEG: Primary | ICD-10-CM

## 2022-05-05 PROCEDURE — 99213 PR OFFICE/OUTPT VISIT, EST, LEVL III, 20-29 MIN: ICD-10-PCS | Mod: S$PBB,,, | Performed by: NURSE PRACTITIONER

## 2022-05-05 PROCEDURE — 3078F PR MOST RECENT DIASTOLIC BLOOD PRESSURE < 80 MM HG: ICD-10-PCS | Mod: CPTII,,, | Performed by: NURSE PRACTITIONER

## 2022-05-05 PROCEDURE — 1159F PR MEDICATION LIST DOCUMENTED IN MEDICAL RECORD: ICD-10-PCS | Mod: CPTII,,, | Performed by: NURSE PRACTITIONER

## 2022-05-05 PROCEDURE — 1159F MED LIST DOCD IN RCRD: CPT | Mod: CPTII,,, | Performed by: NURSE PRACTITIONER

## 2022-05-05 PROCEDURE — 99215 OFFICE O/P EST HI 40 MIN: CPT | Mod: PBBFAC | Performed by: NURSE PRACTITIONER

## 2022-05-05 PROCEDURE — 4010F ACE/ARB THERAPY RXD/TAKEN: CPT | Mod: CPTII,,, | Performed by: NURSE PRACTITIONER

## 2022-05-05 PROCEDURE — 3077F SYST BP >= 140 MM HG: CPT | Mod: CPTII,,, | Performed by: NURSE PRACTITIONER

## 2022-05-05 PROCEDURE — 3077F PR MOST RECENT SYSTOLIC BLOOD PRESSURE >= 140 MM HG: ICD-10-PCS | Mod: CPTII,,, | Performed by: NURSE PRACTITIONER

## 2022-05-05 PROCEDURE — 1160F RVW MEDS BY RX/DR IN RCRD: CPT | Mod: CPTII,,, | Performed by: NURSE PRACTITIONER

## 2022-05-05 PROCEDURE — 1160F PR REVIEW ALL MEDS BY PRESCRIBER/CLIN PHARMACIST DOCUMENTED: ICD-10-PCS | Mod: CPTII,,, | Performed by: NURSE PRACTITIONER

## 2022-05-05 PROCEDURE — 99213 OFFICE O/P EST LOW 20 MIN: CPT | Mod: S$PBB,,, | Performed by: NURSE PRACTITIONER

## 2022-05-05 PROCEDURE — 4010F PR ACE/ARB THEARPY RXD/TAKEN: ICD-10-PCS | Mod: CPTII,,, | Performed by: NURSE PRACTITIONER

## 2022-05-05 PROCEDURE — 3078F DIAST BP <80 MM HG: CPT | Mod: CPTII,,, | Performed by: NURSE PRACTITIONER

## 2022-05-05 RX ORDER — METOPROLOL TARTRATE 25 MG/1
25 TABLET, FILM COATED ORAL 3 TIMES DAILY
Qty: 90 TABLET | Refills: 1 | Status: SHIPPED | OUTPATIENT
Start: 2022-05-05 | End: 2022-07-12

## 2022-05-05 NOTE — PROGRESS NOTES
MYA Escobar   [facility] - WOUND CARE  [address] [facility] [place] [address]  [phone]      PATIENT NAME: Delma Rae  : 1945  DATE: 22  MRN: [id number]      Billing Provider: MYA Escobar  Level of Service:   Patient PCP Information     Provider PCP Type    Fco Chaparro MD General          Reason for Visit / Chief Complaint: Non-healing Wound Follow Up and Venous Ulcer (Right lower leg)       History of Present Illness / Problem Focused Workflow     Delma Rae is a 76 y.o. female who presents to clinic for follow up on chronic-non healing wound on the right lower leg. Patient is compliant with current treatment plan. Wound is healing well.  Pertinent factors that delay wound healing include multiple co-morbidities, poor vascular supply, diabetes, edema, heavy drainage, excessive wound moisture and macerated tissue, large surface area, advanced age and fragile skin. Denies fever and chills.        Review of Systems     Review of Systems   Constitutional: Negative for activity change, chills and fever.   Respiratory: Negative for chest tightness and shortness of breath.    Cardiovascular: Positive for leg swelling. Negative for chest pain and palpitations.   Musculoskeletal: Positive for arthralgias and joint swelling.   Skin: Positive for wound.        See wound assessment   Neurological: Positive for weakness and numbness.   Psychiatric/Behavioral: Negative for agitation, behavioral problems, confusion and self-injury.       Medical / Social / Family History     Past Medical History:   Diagnosis Date    Anxiety disorder 2014    Arthritis     Cardiomyopathy     COPD (chronic obstructive pulmonary disease)     Coronary artery disease     Diabetes mellitus, type 2     Dilated cardiomyopathy 2016    DJD of right shoulder 10/08/2014    Fibrosis of lung 2015    F/uU BY dr. Dm HYATT (gastroesophageal  reflux disease)     HHD (hypertensive heart disease)     Hyperlipidemia     Hypertension     MI (myocardial infarction)     OA (osteoarthritis)     Rheumatoid arthritis        Past Surgical History:   Procedure Laterality Date    HYSTERECTOMY      OOPHORECTOMY      VAGINAL DELIVERY      x 1       Social History  Ms. Delma aRe  reports that she has never smoked. She has never used smokeless tobacco. She reports that she does not drink alcohol and does not use drugs.    Family History  Ms.'s Delma Rae family history includes Cancer in her brother and father; Coronary artery disease in her brother and sister; Diabetes in her father and mother; Hypertension in her mother and son; Prostate cancer in her father.    Medications and Allergies     Medications  Outpatient Medications Marked as Taking for the 5/5/22 encounter (Office Visit) with MYA Escobar   Medication Sig Dispense Refill    albuterol (PROVENTIL/VENTOLIN HFA) 90 mcg/actuation inhaler Inhale 2 puffs into the lungs every 4 (four) hours as needed for Wheezing.      amitriptyline (ELAVIL) 10 MG tablet Take 1 tablet by mouth every evening.      amLODIPine (NORVASC) 5 MG tablet Take 1 tablet (5 mg total) by mouth once daily. 90 tablet 1    aspirin (ECOTRIN) 81 MG EC tablet Take 81 mg by mouth once daily.      atorvastatin (LIPITOR) 40 MG tablet Take 1 tablet (40 mg total) by mouth once daily. 90 tablet 1    blood sugar diagnostic (ONETOUCH ULTRA BLUE TEST STRIP) Strp 1 strip by abdominal subcutaneous route 2 (two) times a day. Check glucose twice a day 200 strip 5    brimonidine 0.2% (ALPHAGAN) 0.2 % Drop Place 1 drop into both eyes 2 (two) times daily.      cadexomer iodine (IODOSORB) 0.9 % gel Apply topically once daily at 6am. Apply to wound daily 40 g 0    fluticasone furoate-vilanteroL (BREO ELLIPTA) 100-25 mcg/dose diskus inhaler Inhale 1 puff into the lungs daily as needed.      FLUTICASONE PROPIONATE NASL 1 spray  "by Nasal route once daily.      folic acid (FOLVITE) 1 MG tablet Take 1 mg by mouth once daily.      hydrALAZINE (APRESOLINE) 25 MG tablet Take 1 tablet (25 mg total) by mouth 3 (three) times daily. 270 tablet 1    insulin NPH-insulin regular, 70/30, (NOVOLIN 70/30 U-100 INSULIN) 100 unit/mL (70-30) injection INJECT 40 UNITS UNDER SKIN IN THE MORNING AND 15 IN THE EVENING. 50 mL 6    insulin syringe-needle U-100 1 mL 31 gauge x 5/16 Syrg Check blood glucose 4 times a day as needed. 200 each 3    insulin syringe-needle U-100 1/2 mL 31 gauge x 15/64" Syrg by Misc.(Non-Drug; Combo Route) route. Use as directed with insulin      latanoprost 0.005 % ophthalmic solution Place 1 drop into both eyes once daily.      loratadine (CLARITIN) 10 mg tablet Take 1 tablet (10 mg total) by mouth once daily. 90 tablet 1    losartan (COZAAR) 100 MG tablet Take 1 tablet (100 mg total) by mouth once daily. 30 tablet 0    meloxicam (MOBIC) 7.5 MG tablet Take 7.5 mg by mouth once daily.      methotrexate 2.5 MG Tab Take 2.5 mg by mouth every 7 days. 5 tablets q7 days      metoprolol tartrate (LOPRESSOR) 25 MG tablet Take 1 tablet (25 mg total) by mouth 3 (three) times daily. 90 tablet 1    multivitamin/iron/folic acid (CENTRUM ULTRA WOMEN'S ORAL) Take 1 tablet by mouth once daily.      naproxen (NAPROSYN) 500 MG tablet Take 500 mg by mouth 2 (two) times daily as needed.       omeprazole (PRILOSEC) 20 MG capsule Take 1 capsule (20 mg total) by mouth once daily. 90 capsule 1    pantoprazole (PROTONIX) 40 MG tablet Take 1 tablet (40 mg total) by mouth once daily. 60 tablet 6    potassium chloride SA (K-DUR,KLOR-CON) 10 MEQ tablet Take 1 tablet (10 mEq total) by mouth 3 (three) times daily. 90 tablet 1    predniSONE (DELTASONE) 5 MG tablet Take 5 mg by mouth daily as needed.       pregabalin (LYRICA) 50 MG capsule Take 1 capsule by mouth 3 (three) times daily.      torsemide (DEMADEX) 20 MG Tab Take 2 tablets (40 mg total) " by mouth once daily. 60 tablet 3    triamcinolone acetonide 0.1% (KENALOG) 0.1 % cream Apply topically 2 (two) times daily. Apply to affected area twice a day as needed 454 g 1    venlafaxine (EFFEXOR-XR) 75 MG 24 hr capsule Take 1 capsule (75 mg total) by mouth every other day. 45 capsule 1    zolpidem (AMBIEN) 5 MG Tab Take 5 mg by mouth nightly as needed.         Allergies  Review of patient's allergies indicates:  No Known Allergies    Physical Examination     Vitals:    05/05/22 1348   BP: (!) 175/75   Pulse: 108   Resp: 20   Temp: 97.8 °F (36.6 °C)     Physical Exam  Vitals and nursing note reviewed.   Constitutional:       Appearance: Normal appearance.   HENT:      Head: Normocephalic.   Cardiovascular:      Rate and Rhythm: Normal rate and regular rhythm.      Pulses: Normal pulses.      Heart sounds: Normal heart sounds.   Pulmonary:      Effort: Pulmonary effort is normal. No respiratory distress.   Chest:      Chest wall: No tenderness.   Musculoskeletal:         General: Swelling present.      Right lower leg: Edema present.   Skin:     General: Skin is warm and dry.      Capillary Refill: Capillary refill takes 2 to 3 seconds.      Comments: See wound assessment    Neurological:      General: No focal deficit present.      Mental Status: She is alert and oriented to person, place, and time. Mental status is at baseline.   Psychiatric:         Mood and Affect: Mood normal.         Behavior: Behavior normal.         Thought Content: Thought content normal.         Judgment: Judgment normal.         Assessment and Plan      1. Venous ulcer of right leg    2. Non-pressure chronic ulcer of other part of right lower leg with fat layer exposed           Wound 09/27/21 Right lower;lateral Leg #3 (Active)   09/27/21     Pre-existing:    Primary Wound Type:    Side: Right   Orientation: lower;lateral   Location: Leg   Wound Number: #3   Ankle-Brachial Index:    Pulses:    Removal Indication and Assessment:     Wound Outcome:    (Retired) Wound Type:    (Retired) Wound Length (cm):    (Retired) Wound Width (cm):    (Retired) Depth (cm):    Wound Description (Comments):    Removal Indications:    Wound Image    05/05/22 1353   Dressing Appearance Intact;Moist drainage 05/05/22 1353   Drainage Amount Moderate 05/05/22 1353   Drainage Characteristics/Odor Serosanguineous 05/05/22 1353   Appearance Red;Moist;Granulating 05/05/22 1353   Tissue loss description Full thickness 05/05/22 1353   Black (%), Wound Tissue Color 0 % 05/05/22 1353   Red (%), Wound Tissue Color 100 % 05/05/22 1353   Yellow (%), Wound Tissue Color 0 % 05/05/22 1353   Periwound Area Dry 05/05/22 1353   Wound Edges Irregular 05/05/22 1353   Wound Length (cm) 2 cm 05/05/22 1353   Wound Width (cm) 2.1 cm 05/05/22 1353   Wound Depth (cm) 0.3 cm 05/05/22 1353   Wound Volume (cm^3) 1.26 cm^3 05/05/22 1353   Wound Surface Area (cm^2) 4.2 cm^2 05/05/22 1353   Care Cleansed with:;Antimicrobial agent;Applied:;Povidone iodine 05/05/22 1353   Dressing Applied;Gauze;Rolled gauze 05/05/22 1353   Periwound Care Moisture barrier applied 05/05/22 1353   Compression Tubular elasticized bandage 05/05/22 1353   Dressing Change Due 05/06/22 05/05/22 1353         Active Problem List with Overview Notes    Diagnosis Date Noted    Venous insufficiency of both lower extremities 10/20/2021     With a ulcer on her RLE x 2 months;   Being seen by wound care      Venous stasis ulcer 09/27/2021     infected.      PVD (peripheral vascular disease) 09/27/2021    COVID-19 08/06/2021    COVID-19 virus infection 08/03/2021    Acute on chronic systolic congestive heart failure 08/03/2021    Essential hypertension 08/03/2021    Diabetes mellitus, type 2 08/03/2021     Continue Accucheck AC&HS  Continue diabetic diet  Continune home diabetic medication      S/P 2-vessel coronary artery bypass 05/10/2021     7/2004 Dr. Leena TRIVEDI top the LAD and SVG to the OM1      COPD (chronic  obstructive pulmonary disease)      Requires continuous supplemental oxygen      HHD (hypertensive heart disease)     MI (myocardial infarction)      7/2004      Coronary artery disease      CABG 2004      Cardiomyopathy      EF 35% by LVgram 7/17/2020  35-40 % by echo 3/9/2020      Pulmonary hypertension      Severe by echo 3/9/2020      Rheumatoid arthritis         Plan:   Clean with baby shampoo and water  Moisturize legs every other day then apply Polymem WIC wound,cover with dry gauze,Wrap with kerlix and ace wrap from toes to just below knee  Change every other day and as needed  Elevate legs as much as possible  Avoid prolonged standing  Monitor closely for s/s of infection including fever, chills, increase in pain, odor from wound, and increased redness from foot. Go to ER if any complications develop.   Keep leg elevated and avoid pressure on wound.  Diabetes:  Monitor glucose closely. Check fasting glucose and 2 hours after meals. HgA1C goal <7, fasting glucose , and 2 hours after meals <180  Hypertension:  Check blood pressure twice daily, goal <120/80  Diet:   Increase protein intake, avoid fried, fatty foods and foods high in simple carbs.   Vitamins:  Take vitamin C 1000 mg, zinc 50mg, vitamin d 5000 units, and a daily multivitamin. Ernie is a good source of protein and nutrients to aid in wound healing.     Problem List Items Addressed This Visit    None     Visit Diagnoses     Venous ulcer of right leg    -  Primary    Non-pressure chronic ulcer of other part of right lower leg with fat layer exposed              Future Appointments   Date Time Provider Department Center   5/24/2022  1:00 PM MYA Escobar [facility]   6/6/2022  3:00 PM AWV NURSE, [facility] FAMILY MEDICINE [facility] SHELDON Simental   6/20/2022  2:00 PM Fco Chaparro MD [facility] S[place]   10/26/2022  2:00 PM CRISTINA Smith O[facility]   11/8/2022  1:30 PM [facility]  [facility]            Signature:  MYA Escobar  [facility] - WOUND CARE  [address] [facility] [place] [address]  [phone]    Date of encounter: 5/5/22

## 2022-05-05 NOTE — PATIENT INSTRUCTIONS
Clean with baby shampoo and water    Moisturize legs every other day then apply Polymem WIC wound,cover with dry gauze,Wrap with kerlix and ace wrap from toes to just below knee    Change every other day and as needed    Elevate legs as much as possible    No standing    Monitor closely for s/s of infection including fever, chills, increase in pain, odor from wound, and increased redness from foot. Go to ER if any complications develop.   Keep leg elevated and avoid pressure on wound.  Diabetes:  Monitor glucose closely. Check fasting glucose and 2 hours after meals. HgA1C goal <7, fasting glucose , and 2 hours after meals <180  Hypertension:  Check blood pressure twice daily, goal <120/80  Diet:   Increase protein intake, avoid fried, fatty foods and foods high in simple carbs.   Vitamins:  Take vitamin C 1000 mg, zinc 50mg, vitamin d 5000 units, and a daily multivitamin. Ernie is a good source of protein and nutrients to aid in wound healing.

## 2022-05-24 ENCOUNTER — OFFICE VISIT (OUTPATIENT)
Dept: WOUND CARE | Facility: CLINIC | Age: 77
End: 2022-05-24
Attending: FAMILY MEDICINE
Payer: COMMERCIAL

## 2022-05-24 VITALS
SYSTOLIC BLOOD PRESSURE: 121 MMHG | TEMPERATURE: 97 F | HEART RATE: 67 BPM | RESPIRATION RATE: 20 BRPM | DIASTOLIC BLOOD PRESSURE: 59 MMHG

## 2022-05-24 DIAGNOSIS — L97.919 VENOUS ULCER OF RIGHT LEG: Primary | ICD-10-CM

## 2022-05-24 DIAGNOSIS — I83.019 VENOUS ULCER OF RIGHT LEG: Primary | ICD-10-CM

## 2022-05-24 PROCEDURE — 1159F MED LIST DOCD IN RCRD: CPT | Mod: CPTII,,, | Performed by: NURSE PRACTITIONER

## 2022-05-24 PROCEDURE — 1160F RVW MEDS BY RX/DR IN RCRD: CPT | Mod: CPTII,,, | Performed by: NURSE PRACTITIONER

## 2022-05-24 PROCEDURE — 3078F PR MOST RECENT DIASTOLIC BLOOD PRESSURE < 80 MM HG: ICD-10-PCS | Mod: CPTII,,, | Performed by: NURSE PRACTITIONER

## 2022-05-24 PROCEDURE — 1159F PR MEDICATION LIST DOCUMENTED IN MEDICAL RECORD: ICD-10-PCS | Mod: CPTII,,, | Performed by: NURSE PRACTITIONER

## 2022-05-24 PROCEDURE — 3288F FALL RISK ASSESSMENT DOCD: CPT | Mod: CPTII,,, | Performed by: NURSE PRACTITIONER

## 2022-05-24 PROCEDURE — 99213 PR OFFICE/OUTPT VISIT, EST, LEVL III, 20-29 MIN: ICD-10-PCS | Mod: S$PBB,,, | Performed by: NURSE PRACTITIONER

## 2022-05-24 PROCEDURE — 99213 OFFICE O/P EST LOW 20 MIN: CPT | Mod: S$PBB,,, | Performed by: NURSE PRACTITIONER

## 2022-05-24 PROCEDURE — 3074F SYST BP LT 130 MM HG: CPT | Mod: CPTII,,, | Performed by: NURSE PRACTITIONER

## 2022-05-24 PROCEDURE — 4010F ACE/ARB THERAPY RXD/TAKEN: CPT | Mod: CPTII,,, | Performed by: NURSE PRACTITIONER

## 2022-05-24 PROCEDURE — 1160F PR REVIEW ALL MEDS BY PRESCRIBER/CLIN PHARMACIST DOCUMENTED: ICD-10-PCS | Mod: CPTII,,, | Performed by: NURSE PRACTITIONER

## 2022-05-24 PROCEDURE — 1101F PT FALLS ASSESS-DOCD LE1/YR: CPT | Mod: CPTII,,, | Performed by: NURSE PRACTITIONER

## 2022-05-24 PROCEDURE — 3078F DIAST BP <80 MM HG: CPT | Mod: CPTII,,, | Performed by: NURSE PRACTITIONER

## 2022-05-24 PROCEDURE — 3288F PR FALLS RISK ASSESSMENT DOCUMENTED: ICD-10-PCS | Mod: CPTII,,, | Performed by: NURSE PRACTITIONER

## 2022-05-24 PROCEDURE — 99213 OFFICE O/P EST LOW 20 MIN: CPT | Mod: PBBFAC | Performed by: NURSE PRACTITIONER

## 2022-05-24 PROCEDURE — 3074F PR MOST RECENT SYSTOLIC BLOOD PRESSURE < 130 MM HG: ICD-10-PCS | Mod: CPTII,,, | Performed by: NURSE PRACTITIONER

## 2022-05-24 PROCEDURE — 4010F PR ACE/ARB THEARPY RXD/TAKEN: ICD-10-PCS | Mod: CPTII,,, | Performed by: NURSE PRACTITIONER

## 2022-05-24 PROCEDURE — 1101F PR PT FALLS ASSESS DOC 0-1 FALLS W/OUT INJ PAST YR: ICD-10-PCS | Mod: CPTII,,, | Performed by: NURSE PRACTITIONER

## 2022-05-24 RX ORDER — FUROSEMIDE 40 MG/1
40 TABLET ORAL 2 TIMES DAILY
COMMUNITY
End: 2022-09-21

## 2022-05-24 NOTE — PROGRESS NOTES
MYA Escobar   [facility] - WOUND CARE  [address] [facility] [place] [address]  [phone]      PATIENT NAME: Delma Rae  : 1945  DATE: 22  MRN: [id number]      Billing Provider: MYA Escobar  Level of Service:   Patient PCP Information     Provider PCP Type    Fco Chaparro MD General          Reason for Visit / Chief Complaint: Venous Ulcer (RLE)       History of Present Illness / Problem Focused Workflow     Delma Rae is a 76 y.o. female who presents to clinic for follow up on chronic-non healing wound on the right lower leg. Patient is compliant with current treatment plan. Wound is healing well.  Pertinent factors that delay wound healing include multiple co-morbidities, poor vascular supply, diabetes, edema, heavy drainage, excessive wound moisture and macerated tissue, large surface area, advanced age and fragile skin. Denies fever and chills.        Review of Systems     Review of Systems   Constitutional: Negative for activity change, chills and fever.   Respiratory: Negative for chest tightness and shortness of breath.    Cardiovascular: Positive for leg swelling. Negative for chest pain and palpitations.   Musculoskeletal: Positive for arthralgias and joint swelling.   Skin: Positive for wound.        See wound assessment   Neurological: Positive for weakness and numbness.   Psychiatric/Behavioral: Negative for agitation, behavioral problems, confusion and self-injury.       Medical / Social / Family History     Past Medical History:   Diagnosis Date    Anxiety disorder 2014    Arthritis     Cardiomyopathy     COPD (chronic obstructive pulmonary disease)     Coronary artery disease     Diabetes mellitus, type 2     Dilated cardiomyopathy 2016    DJD of right shoulder 10/08/2014    Fibrosis of lung 2015    F/uU BY dr. Chaidez    GERD (gastroesophageal reflux disease)     HHD (hypertensive heart  disease)     Hyperlipidemia     Hypertension     MI (myocardial infarction)     OA (osteoarthritis)     Rheumatoid arthritis        Past Surgical History:   Procedure Laterality Date    HYSTERECTOMY      OOPHORECTOMY      VAGINAL DELIVERY      x 1       Social History  Ms. Delma Rae  reports that she has never smoked. She has never used smokeless tobacco. She reports that she does not drink alcohol and does not use drugs.    Family History  Ms.'s Delma Rae family history includes Cancer in her brother and father; Coronary artery disease in her brother and sister; Diabetes in her father and mother; Hypertension in her mother and son; Prostate cancer in her father.    Medications and Allergies     Medications  No outpatient medications have been marked as taking for the 5/24/22 encounter (Office Visit) with MYA Escobar.       Allergies  Review of patient's allergies indicates:  No Known Allergies    Physical Examination     Vitals:    05/24/22 1339   BP: (!) 121/59   Pulse: 67   Resp: 20   Temp: 97 °F (36.1 °C)     Physical Exam  Vitals and nursing note reviewed.   Constitutional:       Appearance: Normal appearance.   HENT:      Head: Normocephalic.   Cardiovascular:      Rate and Rhythm: Normal rate and regular rhythm.      Pulses: Normal pulses.      Heart sounds: Normal heart sounds.   Pulmonary:      Effort: Pulmonary effort is normal. No respiratory distress.   Chest:      Chest wall: No tenderness.   Musculoskeletal:         General: Swelling present.      Right lower leg: Edema present.   Skin:     General: Skin is warm and dry.      Capillary Refill: Capillary refill takes 2 to 3 seconds.      Comments: See wound assessment    Neurological:      General: No focal deficit present.      Mental Status: She is alert and oriented to person, place, and time. Mental status is at baseline.   Psychiatric:         Mood and Affect: Mood normal.         Behavior: Behavior normal.          Thought Content: Thought content normal.         Judgment: Judgment normal.         Assessment and Plan      1. Venous ulcer of right leg           Wound 09/27/21 Right lower;lateral Leg #3 (Active)   09/27/21     Pre-existing:    Primary Wound Type:    Side: Right   Orientation: lower;lateral   Location: Leg   Wound Number: #3   Ankle-Brachial Index:    Pulses:    Removal Indication and Assessment:    Wound Outcome:    (Retired) Wound Type:    (Retired) Wound Length (cm):    (Retired) Wound Width (cm):    (Retired) Depth (cm):    Wound Description (Comments):    Removal Indications:    Wound Image   05/24/22 1343   Dressing Appearance Moist drainage 05/24/22 1343   Drainage Amount Moderate 05/24/22 1343   Drainage Characteristics/Odor Serosanguineous 05/24/22 1343   Appearance Pink;Moist;Granulating 05/24/22 1343   Tissue loss description Full thickness 05/24/22 1343   Black (%), Wound Tissue Color 0 % 05/24/22 1343   Red (%), Wound Tissue Color 100 % 05/24/22 1343   Yellow (%), Wound Tissue Color 0 % 05/24/22 1343   Periwound Area Moist 05/24/22 1343   Wound Edges Irregular 05/24/22 1343   Wound Length (cm) 5.1 cm 05/24/22 1343   Wound Width (cm) 2.5 cm 05/24/22 1343   Wound Depth (cm) 0.3 cm 05/24/22 1343   Wound Volume (cm^3) 3.825 cm^3 05/24/22 1343   Wound Surface Area (cm^2) 12.75 cm^2 05/24/22 1343   Care Cleansed with:;Antimicrobial agent 05/24/22 1343   Dressing Applied;Hydrofiber;Gauze;Rolled gauze;Tubular bandage 05/24/22 1343   Periwound Care Moisture barrier applied 05/24/22 1343   Compression Tubular elasticized bandage 05/24/22 1343   Dressing Change Due 05/25/22 05/24/22 1343         Active Problem List with Overview Notes    Diagnosis Date Noted    Venous ulcer of right leg 05/31/2022    Venous insufficiency of both lower extremities 10/20/2021     With a ulcer on her RLE x 2 months;   Being seen by wound care      Venous stasis ulcer 09/27/2021     infected.      PVD (peripheral vascular  disease) 09/27/2021    COVID-19 08/06/2021    COVID-19 virus infection 08/03/2021    Acute on chronic systolic congestive heart failure 08/03/2021    Essential hypertension 08/03/2021    Diabetes mellitus, type 2 08/03/2021     Continue Accucheck AC&HS  Continue diabetic diet  Continune home diabetic medication      S/P 2-vessel coronary artery bypass 05/10/2021     7/2004 Dr. Leena TRIVEDI top the LAD and SVG to the OM1      COPD (chronic obstructive pulmonary disease)      Requires continuous supplemental oxygen      HHD (hypertensive heart disease)     MI (myocardial infarction)      7/2004      Coronary artery disease      CABG 2004      Cardiomyopathy      EF 35% by LVgram 7/17/2020  35-40 % by echo 3/9/2020      Pulmonary hypertension      Severe by echo 3/9/2020      Rheumatoid arthritis      Plan:   Clean with baby shampoo and water  Moisturize legs every other day then apply Polymem WIC wound,cover with dry gauze,Wrap with kerlix and ace wrap from toes to just below knee  Change every other day and as needed  Elevate legs as much as possible  Avoid prolonged standing  Monitor closely for s/s of infection including fever, chills, increase in pain, odor from wound, and increased redness from foot. Go to ER if any complications develop.   Keep leg elevated and avoid pressure on wound.  Diabetes:  Monitor glucose closely. Check fasting glucose and 2 hours after meals. HgA1C goal <7, fasting glucose , and 2 hours after meals <180  Hypertension:  Check blood pressure twice daily, goal <120/80  Diet:   Increase protein intake, avoid fried, fatty foods and foods high in simple carbs.   Vitamins:  Take vitamin C 1000 mg, zinc 50mg, vitamin d 5000 units, and a daily multivitamin. Ernie is a good source of protein and nutrients to aid in wound healing.     Problem List Items Addressed This Visit        Orthopedic    Venous ulcer of right leg - Primary          Future Appointments   Date Time  Provider Department Center   6/6/2022  3:00 PM AWV NURSE, [facility] FAMILY MEDICINE [facility] SHELDON Simental   6/14/2022  1:00 PM [facility]    6/20/2022  2:00 PM Fco Chaparro MD [facility] SHE[place]ON S[place]   10/26/2022  2:00 PM HANSA SmithP OB CARD [facility]   11/8/2022  1:30 PM [facility] MAMMO1 [facility] MMIC [facility] Shweta            Signature:  MYA Escobar  [facility] - WOUND CARE  [address]  [place] [address]  [phone]    Date of encounter: 5/24/22

## 2022-05-24 NOTE — PATIENT INSTRUCTIONS
Clean with baby shampoo and water  Moisturize legs every other day then apply Polymem WIC wound,cover with dry gauze,Wrap with kerlix and ace wrap from toes to just below knee  Change every other day and as needed  Elevate legs as much as possible  Avoid prolonged standing  Monitor closely for s/s of infection including fever, chills, increase in pain, odor from wound, and increased redness from foot. Go to ER if any complications develop.   Keep leg elevated and avoid pressure on wound.  Diabetes:  Monitor glucose closely. Check fasting glucose and 2 hours after meals. HgA1C goal <7, fasting glucose , and 2 hours after meals <180  Hypertension:  Check blood pressure twice daily, goal <120/80  Diet:   Increase protein intake, avoid fried, fatty foods and foods high in simple carbs.   Vitamins:  Take vitamin C 1000 mg, zinc 50mg, vitamin d 5000 units, and a daily multivitamin. Ernie is a good source of protein and nutrients to aid in wound healing.

## 2022-05-31 PROBLEM — I83.019 VENOUS ULCER OF RIGHT LEG: Status: ACTIVE | Noted: 2022-05-31

## 2022-05-31 PROBLEM — L97.919 VENOUS ULCER OF RIGHT LEG: Status: ACTIVE | Noted: 2022-05-31

## 2022-06-21 ENCOUNTER — OFFICE VISIT (OUTPATIENT)
Dept: WOUND CARE | Facility: CLINIC | Age: 77
End: 2022-06-21
Attending: FAMILY MEDICINE
Payer: COMMERCIAL

## 2022-06-21 VITALS
HEART RATE: 72 BPM | SYSTOLIC BLOOD PRESSURE: 109 MMHG | TEMPERATURE: 98 F | DIASTOLIC BLOOD PRESSURE: 66 MMHG | RESPIRATION RATE: 18 BRPM

## 2022-06-21 DIAGNOSIS — L97.919 VENOUS ULCER OF RIGHT LEG: Primary | ICD-10-CM

## 2022-06-21 DIAGNOSIS — I83.019 VENOUS ULCER OF RIGHT LEG: Primary | ICD-10-CM

## 2022-06-21 PROCEDURE — 4010F PR ACE/ARB THEARPY RXD/TAKEN: ICD-10-PCS | Mod: CPTII,,, | Performed by: NURSE PRACTITIONER

## 2022-06-21 PROCEDURE — 3078F DIAST BP <80 MM HG: CPT | Mod: CPTII,,, | Performed by: NURSE PRACTITIONER

## 2022-06-21 PROCEDURE — 1159F MED LIST DOCD IN RCRD: CPT | Mod: CPTII,,, | Performed by: NURSE PRACTITIONER

## 2022-06-21 PROCEDURE — 99213 OFFICE O/P EST LOW 20 MIN: CPT | Mod: S$PBB,,, | Performed by: NURSE PRACTITIONER

## 2022-06-21 PROCEDURE — 1160F RVW MEDS BY RX/DR IN RCRD: CPT | Mod: CPTII,,, | Performed by: NURSE PRACTITIONER

## 2022-06-21 PROCEDURE — 1160F PR REVIEW ALL MEDS BY PRESCRIBER/CLIN PHARMACIST DOCUMENTED: ICD-10-PCS | Mod: CPTII,,, | Performed by: NURSE PRACTITIONER

## 2022-06-21 PROCEDURE — 3074F PR MOST RECENT SYSTOLIC BLOOD PRESSURE < 130 MM HG: ICD-10-PCS | Mod: CPTII,,, | Performed by: NURSE PRACTITIONER

## 2022-06-21 PROCEDURE — 1159F PR MEDICATION LIST DOCUMENTED IN MEDICAL RECORD: ICD-10-PCS | Mod: CPTII,,, | Performed by: NURSE PRACTITIONER

## 2022-06-21 PROCEDURE — 3074F SYST BP LT 130 MM HG: CPT | Mod: CPTII,,, | Performed by: NURSE PRACTITIONER

## 2022-06-21 PROCEDURE — 4010F ACE/ARB THERAPY RXD/TAKEN: CPT | Mod: CPTII,,, | Performed by: NURSE PRACTITIONER

## 2022-06-21 PROCEDURE — 99213 PR OFFICE/OUTPT VISIT, EST, LEVL III, 20-29 MIN: ICD-10-PCS | Mod: S$PBB,,, | Performed by: NURSE PRACTITIONER

## 2022-06-21 PROCEDURE — 3078F PR MOST RECENT DIASTOLIC BLOOD PRESSURE < 80 MM HG: ICD-10-PCS | Mod: CPTII,,, | Performed by: NURSE PRACTITIONER

## 2022-06-21 PROCEDURE — 99214 OFFICE O/P EST MOD 30 MIN: CPT | Mod: PBBFAC | Performed by: NURSE PRACTITIONER

## 2022-06-21 NOTE — PROGRESS NOTES
See wound assessment. Cont polymem max/compression every other day. RTC 2 weeks. Orders faxed to [facility]. No complaints at this time. Monitor closely for s/s of infection including fever, chills, increase in pain, odor from wound, and increased redness from foot. Go to ER if any complications develop.   Keep leg elevated and avoid pressure on wound.  Diabetes:  Monitor glucose closely. Check fasting glucose and 2 hours after meals. HgA1C goal <7, fasting glucose , and 2 hours after meals <180  Hypertension:  Check blood pressure twice daily, goal <120/80  Diet:   Increase protein intake, avoid fried, fatty foods and foods high in simple carbs.   Vitamins:  Take vitamin C 1000 mg, zinc 50mg, vitamin d 5000 units, and a daily multivitamin. Ernie is a good source of protein and nutrients to aid in wound healing.

## 2022-06-21 NOTE — PROGRESS NOTES
MYA Escobar   [facility] - WOUND CARE  [address] [facility] [place] [address]  [phone]      PATIENT NAME: Delma Rae  : 1945  DATE: 22  MRN: [id number]      Billing Provider: MYA Escobar  Level of Service:   Patient PCP Information     Provider PCP Type    Fco Chaparro MD General          Reason for Visit / Chief Complaint: No chief complaint on file.       History of Present Illness / Problem Focused Workflow     Delma Rae is a 76 y.o. female who presents to clinic for follow up on chronic-non healing wound on the right lower leg. Patient is compliant with current treatment plan. Wound is healing well.  Pertinent factors that delay wound healing include multiple co-morbidities, poor vascular supply, diabetes, edema, heavy drainage, excessive wound moisture and macerated tissue, large surface area, advanced age and fragile skin. Denies fever and chills.        Review of Systems     Review of Systems   Constitutional: Negative for activity change, chills and fever.   Respiratory: Negative for chest tightness and shortness of breath.    Cardiovascular: Positive for leg swelling. Negative for chest pain and palpitations.   Musculoskeletal: Positive for arthralgias and joint swelling.   Skin: Positive for wound.        See wound assessment   Neurological: Positive for weakness and numbness.   Psychiatric/Behavioral: Negative for agitation, behavioral problems, confusion and self-injury.       Medical / Social / Family History     Past Medical History:   Diagnosis Date    Anxiety disorder 2014    Arthritis     Cardiomyopathy     COPD (chronic obstructive pulmonary disease)     Coronary artery disease     Diabetes mellitus, type 2     Dilated cardiomyopathy 2016    DJD of right shoulder 10/08/2014    Fibrosis of lung 2015    F/uU BY dr. Chaidez    GERD (gastroesophageal reflux disease)     HHD (hypertensive  heart disease)     Hyperlipidemia     Hypertension     MI (myocardial infarction)     OA (osteoarthritis)     Rheumatoid arthritis        Past Surgical History:   Procedure Laterality Date    HYSTERECTOMY      OOPHORECTOMY      VAGINAL DELIVERY      x 1       Social History  Ms. Delma Rae  reports that she has never smoked. She has never used smokeless tobacco. She reports that she does not drink alcohol and does not use drugs.    Family History  Ms.'s Delma Rae family history includes Cancer in her brother and father; Coronary artery disease in her brother and sister; Diabetes in her father and mother; Hypertension in her mother and son; Prostate cancer in her father.    Medications and Allergies     Medications  Outpatient Medications Marked as Taking for the 6/21/22 encounter (Office Visit) with MYA Escobar   Medication Sig Dispense Refill    albuterol (PROVENTIL/VENTOLIN HFA) 90 mcg/actuation inhaler Inhale 2 puffs into the lungs every 4 (four) hours as needed for Wheezing.      amitriptyline (ELAVIL) 10 MG tablet Take 1 tablet by mouth every evening.      aspirin (ECOTRIN) 81 MG EC tablet Take 81 mg by mouth once daily.      atorvastatin (LIPITOR) 40 MG tablet Take 1 tablet (40 mg total) by mouth once daily. 90 tablet 1    blood sugar diagnostic (ONETOUCH ULTRA BLUE TEST STRIP) Strp 1 strip by abdominal subcutaneous route 2 (two) times a day. Check glucose twice a day 200 strip 5    brimonidine 0.2% (ALPHAGAN) 0.2 % Drop Place 1 drop into both eyes 2 (two) times daily.      fluticasone furoate-vilanteroL (BREO ELLIPTA) 100-25 mcg/dose diskus inhaler Inhale 1 puff into the lungs daily as needed.      FLUTICASONE PROPIONATE NASL 1 spray by Nasal route once daily.      folic acid (FOLVITE) 1 MG tablet Take 1 mg by mouth once daily.      furosemide (LASIX) 40 MG tablet Take 40 mg by mouth 2 (two) times a day. Take two oral tablets in the mornings and one in the evening    "   hydrALAZINE (APRESOLINE) 25 MG tablet Take 1 tablet (25 mg total) by mouth 3 (three) times daily. 270 tablet 1    insulin NPH-insulin regular, 70/30, (NOVOLIN 70/30 U-100 INSULIN) 100 unit/mL (70-30) injection INJECT 40 UNITS UNDER SKIN IN THE MORNING AND 15 IN THE EVENING. 50 mL 6    insulin syringe-needle U-100 1 mL 31 gauge x 5/16 Syrg Check blood glucose 4 times a day as needed. 200 each 3    insulin syringe-needle U-100 1/2 mL 31 gauge x 15/64" Syrg by Misc.(Non-Drug; Combo Route) route. Use as directed with insulin      latanoprost 0.005 % ophthalmic solution Place 1 drop into both eyes once daily.      loratadine (CLARITIN) 10 mg tablet Take 1 tablet (10 mg total) by mouth once daily. 90 tablet 1    losartan (COZAAR) 100 MG tablet Take 1 tablet (100 mg total) by mouth once daily. 30 tablet 0    meloxicam (MOBIC) 7.5 MG tablet Take 7.5 mg by mouth once daily.      methotrexate 2.5 MG Tab Take 2.5 mg by mouth every 7 days. 5 tablets q7 days      metoprolol tartrate (LOPRESSOR) 25 MG tablet Take 1 tablet (25 mg total) by mouth 3 (three) times daily. 90 tablet 1    multivitamin/iron/folic acid (CENTRUM ULTRA WOMEN'S ORAL) Take 1 tablet by mouth once daily.      naproxen (NAPROSYN) 500 MG tablet Take 500 mg by mouth 2 (two) times daily as needed.       omeprazole (PRILOSEC) 20 MG capsule Take 1 capsule (20 mg total) by mouth once daily. 90 capsule 1    pantoprazole (PROTONIX) 40 MG tablet Take 1 tablet (40 mg total) by mouth once daily. 60 tablet 6    potassium chloride SA (K-DUR,KLOR-CON) 10 MEQ tablet Take 1 tablet (10 mEq total) by mouth 3 (three) times daily. 90 tablet 1    predniSONE (DELTASONE) 5 MG tablet Take 5 mg by mouth daily as needed.       pregabalin (LYRICA) 50 MG capsule Take 1 capsule by mouth 3 (three) times daily.      torsemide (DEMADEX) 20 MG Tab TAKE TWO TABLETS BY MOUTH EVERY DAY. 60 tablet 3    triamcinolone acetonide 0.1% (KENALOG) 0.1 % cream Apply topically 2 (two) " times daily. Apply to affected area twice a day as needed 454 g 1    venlafaxine (EFFEXOR-XR) 75 MG 24 hr capsule Take 1 capsule (75 mg total) by mouth every other day. 45 capsule 1    zolpidem (AMBIEN) 5 MG Tab Take 5 mg by mouth nightly as needed.         Allergies  Review of patient's allergies indicates:  No Known Allergies    Physical Examination     Vitals:    06/21/22 1444   BP: 109/66   Pulse: 72   Resp: 18   Temp: 97.9 °F (36.6 °C)     Physical Exam  Vitals and nursing note reviewed.   Constitutional:       Appearance: Normal appearance.   HENT:      Head: Normocephalic.   Cardiovascular:      Rate and Rhythm: Normal rate and regular rhythm.      Pulses: Normal pulses.      Heart sounds: Normal heart sounds.   Pulmonary:      Effort: Pulmonary effort is normal. No respiratory distress.   Chest:      Chest wall: No tenderness.   Musculoskeletal:         General: Swelling present.      Right lower leg: Edema present.   Skin:     General: Skin is warm and dry.      Capillary Refill: Capillary refill takes 2 to 3 seconds.      Comments: See LDA for photo and measurements   Neurological:      General: No focal deficit present.      Mental Status: She is alert and oriented to person, place, and time. Mental status is at baseline.   Psychiatric:         Mood and Affect: Mood normal.         Behavior: Behavior normal.         Thought Content: Thought content normal.         Judgment: Judgment normal.         Assessment and Plan      1. Venous ulcer of right leg           Wound 09/27/21 Right lower;lateral Leg #3 (Active)   09/27/21     Pre-existing:    Primary Wound Type:    Side: Right   Orientation: lower;lateral   Location: Leg   Wound Number: #3   Ankle-Brachial Index:    Pulses:    Removal Indication and Assessment:    Wound Outcome:    (Retired) Wound Type:    (Retired) Wound Length (cm):    (Retired) Wound Width (cm):    (Retired) Depth (cm):    Wound Description (Comments):    Removal Indications:     Wound Image    06/21/22 1442   Dressing Appearance Dry;Intact;Clean 06/21/22 1442   Drainage Amount Moderate 06/21/22 1442   Drainage Characteristics/Odor Yellow 06/21/22 1442   Appearance Intact;Pink;Red;Yellow;Slough;Moist;Granulating;Epithelialization 06/21/22 1442   Tissue loss description Full thickness 06/21/22 1442   Black (%), Wound Tissue Color 0 % 06/21/22 1442   Red (%), Wound Tissue Color 90 % 06/21/22 1442   Yellow (%), Wound Tissue Color 10 % 06/21/22 1442   Periwound Area Intact;Moist 06/21/22 1442   Wound Edges Defined 06/21/22 1442   Wound Length (cm) 5 cm 06/21/22 1442   Wound Width (cm) 8 cm 06/21/22 1442   Wound Depth (cm) 0.2 cm 06/21/22 1442   Wound Volume (cm^3) 8 cm^3 06/21/22 1442   Wound Surface Area (cm^2) 40 cm^2 06/21/22 1442   Care Cleansed with:;Antimicrobial agent 06/21/22 1442   Dressing Applied;Silver;Calcium alginate;Gauze;Rolled gauze 06/21/22 1442         Active Problem List with Overview Notes    Diagnosis Date Noted    Venous ulcer of right leg 05/31/2022    Venous insufficiency of both lower extremities 10/20/2021     With a ulcer on her RLE x 2 months;   Being seen by wound care      Venous stasis ulcer 09/27/2021     infected.      PVD (peripheral vascular disease) 09/27/2021    COVID-19 08/06/2021    COVID-19 virus infection 08/03/2021    Acute on chronic systolic congestive heart failure 08/03/2021    Essential hypertension 08/03/2021    Diabetes mellitus, type 2 08/03/2021     Continue Accucheck AC&HS  Continue diabetic diet  Continune home diabetic medication      S/P 2-vessel coronary artery bypass 05/10/2021     7/2004 Dr. Leena TRIVEDI top the LAD and SVG to the OM1      COPD (chronic obstructive pulmonary disease)      Requires continuous supplemental oxygen      HHD (hypertensive heart disease)     MI (myocardial infarction)      7/2004      Coronary artery disease      CABG 2004      Cardiomyopathy      EF 35% by LVgram 7/17/2020  35-40 % by echo  3/9/2020      Pulmonary hypertension      Severe by echo 3/9/2020      Rheumatoid arthritis       Plan:   Clean with baby shampoo and water  Moisturize legs every other day then apply Polymem WIC wound,cover with dry gauze,Wrap with kerlix and ace wrap from toes to just below knee  Change every other day and as needed  Elevate legs as much as possible  Avoid prolonged standing  Monitor closely for s/s of infection including fever, chills, increase in pain, odor from wound, and increased redness from foot. Go to ER if any complications develop.   Keep leg elevated and avoid pressure on wound.  Diabetes:  Monitor glucose closely. Check fasting glucose and 2 hours after meals. HgA1C goal <7, fasting glucose , and 2 hours after meals <180  Hypertension:  Check blood pressure twice daily, goal <120/80  Diet:   Increase protein intake, avoid fried, fatty foods and foods high in simple carbs.   Vitamins:  Take vitamin C 1000 mg, zinc 50mg, vitamin d 5000 units, and a daily multivitamin. Ernie is a good source of protein and nutrients to aid in wound healing.     Problem List Items Addressed This Visit        Orthopedic    Venous ulcer of right leg - Primary          Future Appointments   Date Time Provider Department Center   6/23/2022  2:30 PM Fco Chaparro MD [facility] SHELDON Simental   7/12/2022  1:00 PM MYA Escobar [facility]    9/20/2022  2:00 PM AWV NURSE, [facility] FAMILY MEDICINE [facility] SHELDON Simental   10/26/2022  2:00 PM CRISTINA Smith [facility] CARD [facility]   11/8/2022  1:30 PM [facility] MAMMO1 [facility] MMIC [facility] Shweta            Signature:  MYA Escobar  [facility] - WOUND CARE  [address]  [place] [address]  [phone]    Date of encounter: 6/21/22

## 2022-07-06 DIAGNOSIS — I10 HYPERTENSION, UNSPECIFIED TYPE: ICD-10-CM

## 2022-07-06 RX ORDER — LOSARTAN POTASSIUM 100 MG/1
100 TABLET ORAL DAILY
Qty: 30 TABLET | Refills: 1 | Status: SHIPPED | OUTPATIENT
Start: 2022-07-06 | End: 2022-08-10 | Stop reason: SDUPTHER

## 2022-07-06 RX ORDER — HYDRALAZINE HYDROCHLORIDE 25 MG/1
25 TABLET, FILM COATED ORAL 3 TIMES DAILY
Qty: 270 TABLET | Refills: 1 | Status: SHIPPED | OUTPATIENT
Start: 2022-07-06 | End: 2022-11-03 | Stop reason: ALTCHOICE

## 2022-08-10 RX ORDER — LOSARTAN POTASSIUM 100 MG/1
100 TABLET ORAL DAILY
Qty: 30 TABLET | Refills: 5 | Status: SHIPPED | OUTPATIENT
Start: 2022-08-10 | End: 2023-02-28 | Stop reason: SDUPTHER

## 2022-08-15 NOTE — PATIENT INSTRUCTIONS
Clean with baby shampoo and water  Apply aloe vesta anti fungal ointment to leg then apply vashe moisten drawtex (cut  larger than wound) cover with dry gauze,Wrap with kerlix and ace wrap from toes to just below knee  Change daily and as needed  Apply large edema wear to left leg  Elevate legs as much as possible  Avoid prolonged standing  Monitor closely for s/s of infection including fever, chills, increase in pain, odor from wound, and increased redness from foot. Go to ER if any complications develop.   Keep leg elevated and avoid pressure on wound.  Diabetes:  Monitor glucose closely. Check fasting glucose and 2 hours after meals. HgA1C goal <7, fasting glucose , and 2 hours after meals <180  Hypertension:  Check blood pressure twice daily, goal <120/80  Diet:   Increase protein intake, avoid fried, fatty foods and foods high in simple carbs.   Vitamins:  Take vitamin C 1000 mg, zinc 50mg, vitamin d 5000 units, and a daily multivitamin. Ernie is a good source of protein and nutrients to aid in wound healing.

## 2022-08-15 NOTE — PROGRESS NOTES
MYA Escobar   [facility]  [address] [facility] [place] [address]  [phone]      PATIENT NAME: Delma Rae  : 1945  DATE: 22  MRN: [id number]      Billing Provider: MYA Escobar  Level of Service:   Patient PCP Information     Provider PCP Type    Fco Chaparro MD General          Reason for Visit / Chief Complaint: Non-healing Wound Follow Up (Right lower leg)       History of Present Illness / Problem Focused Workflow     Delma Rae is a 76 y.o. female who presents to clinic for follow up on chronic-non healing wound on the right lower leg. Patient is compliant with current treatment plan. Wound continues to improve. Moderate amount of serosanguinous drainage noted. Left lower edematous. Denies pain in left lower extremity.  Pertinent factors that delay wound healing include multiple co-morbidities, poor vascular supply, diabetes, edema, heavy drainage, excessive wound moisture and macerated tissue, large surface area, advanced age and fragile skin. Denies fever and chills.        Review of Systems     Review of Systems   Constitutional: Negative for activity change, chills and fever.   Respiratory: Negative for chest tightness and shortness of breath.    Cardiovascular: Positive for leg swelling. Negative for chest pain and palpitations.   Musculoskeletal: Positive for arthralgias and joint swelling.   Skin: Positive for wound.        See wound assessment   Neurological: Positive for weakness and numbness.   Psychiatric/Behavioral: Negative for agitation, behavioral problems, confusion and self-injury.       Medical / Social / Family History     Past Medical History:   Diagnosis Date    Anxiety disorder 2014    Arthritis     Cardiomyopathy     COPD (chronic obstructive pulmonary disease)     Coronary artery disease     Diabetes mellitus, type 2     Dilated cardiomyopathy 2016    DJD of right  Documentation entered by Teresa Baxter SCRIBE, acting as scribe for Gwendolyn Reddy MD.








Gwendolyn Reddy MD:  This documentation has been prepared by the Vee rodriguez Brenda, SCRIBE, under my direction and personally reviewed by me in its 

entirety.  I confirm that the documentation accurately reflects all work, 

treatment, procedures, and medical decision making performed by me.  





Attending Attestation





- Resident


Resident Name: GuidoVictor Manuel





- ED Attending Attestation


I have performed the following: I have examined & evaluated the patient, The 

case was reviewed & discussed with the resident, I agree w/resident's findings &

plan, Exceptions are as noted





- HPI


HPI: 





07/24/20 23:47


The patient is a 8 year old male with no significant PMH who presents to the ED 

accompanied by mother for evaluation of right shin laceration. Per mother, the 

patient was at a family's member house, at which time he was outside running in 

the garden area when he hit a metal pot and fell. Mother denies any head trauma,

LOC, dizziness or significant pain. No glass involvement or debris. Tetanus is 

up to date. 





Allergies: NKA 





- Physicial Exam


PE: 





07/24/20 23:55


GENERAL: well-appearing, A/Ox4, no distress, answers questions appropriately, 

mother at bedside is supportive


HEENT: PERRLA, EOMI, moist mucous membranes, no e/o trauma


NECK/BACK: no midline ttp, no spinal step-off or deformity, no hematoma, full 

ROM, neck supple


CARDIOVASCULAR: strong peripheral pulses, capillary refill <2 seconds, 

extremities wwp


LUNGS/RESPIRATORY: no respiratory distress, no tachypnea


GI/ABDOMEN: symmetric side-to-side, normoactive BS, soft, no ttp, no midline 

pulsatile masses


: no CVA tenderness


MSK/EXTREMITIES: right mid-anterior tibia with 3.5 cm linear partial thickness 

laceration which is hemostatic, no tendon involvement, distally NV intact, no 

muscle atrophy, no acute deformity


SKIN: is otherwise warm and dry, no pallor, no jaundice, no rash, no pathologic-

appearing bruising, no skin breakdown, no cuts, no lesions


NEUROLOGICAL: GCS 15, CN II-XII grossly intact, 5/5 strength proximally and 

distally





- Medical Decision Making





07/24/20 23:58


8YOM p/w right anterior tibia skin laceration which is partial-thickness, 

uncomplicated, distal NV intact





                               Initial Vital Signs











Temp Pulse Resp BP Pulse Ox


 


 98 F   106 H  20   120/79   99 


 


 07/24/20 22:49  07/24/20 22:49  07/24/20 22:49  07/24/20 22:49  07/24/20 22:49








Most likely simple laceration without neurovascular injury, unlikely bony 

disruption as there are nodeformities or other more serious injury. Wound is 

hemostatic on presentation to the ED. Tdap UTD. Laceration repair as noted in 

resident note. He is appropriate for d/c home with outpatient f/u. Return 

precautions discussed, he will f/u for suture removal in 7 days.





Discharge





- Discharge Information


Problems reviewed: Yes


Clinical Impression/Diagnosis: 


 Laceration





Condition: Good


Disposition: HOME





- Admission


No





- Follow up/Referral


Referrals: 


ON STAFF,NOT [Primary Care Provider] - 





- Patient Discharge Instructions


Patient Printed Discharge Instructions:  DI for Laceration Repair


Additional Instructions: 


you are here for a laceration repair. 





7 stiches were placed. 7 days later, the stiches need to be removed by a health 

care provider in either PCP or EM or urgent care. 





Wound care was instructed to mother. Pain control tylenol OTC can be used if 

needed. 





If the wound got inflammed: red, swelling, pain, please come back. If the s

tiches are broken, please come back. 





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 Chaidez    GERD (gastroesophageal reflux disease)     HHD (hypertensive heart disease)     Hyperlipidemia     Hypertension     MI (myocardial infarction)     OA (osteoarthritis)     Rheumatoid arthritis        Past Surgical History:   Procedure Laterality Date    HYSTERECTOMY      OOPHORECTOMY      VAGINAL DELIVERY      x 1       Social History  Ms. Delma Rae  reports that she has never smoked. She has never used smokeless tobacco. She reports that she does not drink alcohol and does not use drugs.    Family History  Ms.'s Delma Rae family history includes Cancer in her brother and father; Coronary artery disease in her brother and sister; Diabetes in her father and mother; Hypertension in her mother and son; Prostate cancer in her father.    Medications and Allergies     Medications  Outpatient Medications Marked as Taking for the 8/16/22 encounter (Office Visit) with MYA Escobar   Medication Sig Dispense Refill    albuterol (PROVENTIL/VENTOLIN HFA) 90 mcg/actuation inhaler Inhale 2 puffs into the lungs every 4 (four) hours as needed for Wheezing.      amitriptyline (ELAVIL) 10 MG tablet Take 1 tablet by mouth every evening.      aspirin (ECOTRIN) 81 MG EC tablet Take 81 mg by mouth once daily.      atorvastatin (LIPITOR) 40 MG tablet Take 1 tablet (40 mg total) by mouth once daily. 90 tablet 1    blood sugar diagnostic (ONETOUCH ULTRA BLUE TEST STRIP) Strp 1 strip by abdominal subcutaneous route 2 (two) times a day. Check glucose twice a day 200 strip 5    brimonidine 0.2% (ALPHAGAN) 0.2 % Drop Place 1 drop into both eyes 2 (two) times daily.      fluticasone furoate-vilanteroL (BREO ELLIPTA) 100-25 mcg/dose diskus inhaler Inhale 1 puff into the lungs daily as needed.      FLUTICASONE PROPIONATE NASL 1 spray by Nasal route once daily.      folic acid (FOLVITE) 1 MG tablet Take 1 mg by mouth once daily.  "     furosemide (LASIX) 40 MG tablet Take 40 mg by mouth 2 (two) times a day. Take two oral tablets in the mornings and one in the evening      hydrALAZINE (APRESOLINE) 25 MG tablet Take 1 tablet (25 mg total) by mouth 3 (three) times daily. 270 tablet 1    hydrOXYchloroQUINE (PLAQUENIL) 200 mg tablet Take 200 mg by mouth once daily at 6am.      insulin NPH-insulin regular, 70/30, (NOVOLIN 70/30 U-100 INSULIN) 100 unit/mL (70-30) injection INJECT 40 UNITS UNDER SKIN IN THE MORNING AND 15 IN THE EVENING. 50 mL 6    insulin syringe-needle U-100 1 mL 31 gauge x 5/16 Syrg Check blood glucose 4 times a day as needed. 200 each 3    insulin syringe-needle U-100 1/2 mL 31 gauge x 15/64" Syrg by Misc.(Non-Drug; Combo Route) route. Use as directed with insulin      latanoprost 0.005 % ophthalmic solution Place 1 drop into both eyes once daily.      loratadine (CLARITIN) 10 mg tablet Take 1 tablet (10 mg total) by mouth once daily. 90 tablet 1    losartan (COZAAR) 100 MG tablet Take 1 tablet (100 mg total) by mouth once daily. 30 tablet 5    meloxicam (MOBIC) 7.5 MG tablet Take 7.5 mg by mouth once daily.      methotrexate 2.5 MG Tab Take 2.5 mg by mouth every 7 days. 5 tablets q7 days      metoprolol tartrate (LOPRESSOR) 25 MG tablet TAKE ONE TABLET BY MOUTH THREE TIMES DAILY 90 tablet 1    multivitamin/iron/folic acid (CENTRUM ULTRA WOMEN'S ORAL) Take 1 tablet by mouth once daily.      naproxen (NAPROSYN) 500 MG tablet Take 500 mg by mouth 2 (two) times daily as needed.       omeprazole (PRILOSEC) 20 MG capsule Take 1 capsule (20 mg total) by mouth once daily. 90 capsule 1    pantoprazole (PROTONIX) 40 MG tablet Take 1 tablet (40 mg total) by mouth once daily. 60 tablet 6    potassium chloride SA (K-DUR,KLOR-CON) 10 MEQ tablet Take 1 tablet (10 mEq total) by mouth 3 (three) times daily. 90 tablet 1    predniSONE (DELTASONE) 2.5 MG tablet Take 2.5 mg by mouth once daily at 6am.      pregabalin (LYRICA) 50 " MG capsule Take 1 capsule by mouth 3 (three) times daily.      torsemide (DEMADEX) 20 MG Tab TAKE TWO TABLETS BY MOUTH EVERY DAY. 60 tablet 3    triamcinolone acetonide 0.1% (KENALOG) 0.1 % cream Apply topically 2 (two) times daily. Apply to affected area twice a day as needed 454 g 1    venlafaxine (EFFEXOR-XR) 75 MG 24 hr capsule Take 1 capsule (75 mg total) by mouth every other day. 45 capsule 1    zolpidem (AMBIEN) 5 MG Tab Take 5 mg by mouth nightly as needed.         Allergies  Review of patient's allergies indicates:  No Known Allergies    Physical Examination     Vitals:    08/16/22 1339   BP: 114/61   Pulse: 68   Resp: 20   Temp: 97 °F (36.1 °C)     Physical Exam  Vitals and nursing note reviewed.   Constitutional:       Appearance: Normal appearance.   HENT:      Head: Normocephalic.   Cardiovascular:      Rate and Rhythm: Normal rate and regular rhythm.      Pulses: Normal pulses.      Heart sounds: Normal heart sounds.   Pulmonary:      Effort: Pulmonary effort is normal. No respiratory distress.   Chest:      Chest wall: No tenderness.   Musculoskeletal:         General: Swelling present.      Right lower leg: Edema present.      Left lower leg: Edema present.   Skin:     General: Skin is warm and dry.      Capillary Refill: Capillary refill takes 2 to 3 seconds.      Findings: Erythema present.      Comments: See LDA for photo and measurements   Neurological:      General: No focal deficit present.      Mental Status: She is alert and oriented to person, place, and time. Mental status is at baseline.   Psychiatric:         Mood and Affect: Mood normal.         Behavior: Behavior normal.         Thought Content: Thought content normal.         Judgment: Judgment normal.         Assessment and Plan      1. Venous ulcer of right leg    2. Non-pressure chronic ulcer of other part of right lower leg with fat layer exposed    3. PVD (peripheral vascular disease)           Wound 09/27/21 Right  lower;lateral Leg #3 (Active)   09/27/21     Pre-existing:    Primary Wound Type:    Side: Right   Orientation: lower;lateral   Location: Leg   Wound Number: #3   Ankle-Brachial Index:    Pulses:    Removal Indication and Assessment:    Wound Outcome:    (Retired) Wound Type:    (Retired) Wound Length (cm):    (Retired) Wound Width (cm):    (Retired) Depth (cm):    Wound Description (Comments):    Removal Indications:    Wound Image    08/16/22 1343   Dressing Appearance Moist drainage 08/16/22 1343   Drainage Amount Moderate 08/16/22 1343   Drainage Characteristics/Odor Serosanguineous 08/16/22 1343   Appearance Pink;Moist;Granulating 08/16/22 1343   Tissue loss description Full thickness 08/16/22 1343   Black (%), Wound Tissue Color 0 % 08/16/22 1343   Red (%), Wound Tissue Color 100 % 08/16/22 1343   Yellow (%), Wound Tissue Color 0 % 08/16/22 1343   Periwound Area Moist 08/16/22 1343   Wound Length (cm) 0.6 cm 08/16/22 1343   Wound Width (cm) 0.4 cm 08/16/22 1343   Wound Depth (cm) 0.3 cm 08/16/22 1343   Wound Volume (cm^3) 0.072 cm^3 08/16/22 1343   Wound Surface Area (cm^2) 0.24 cm^2 08/16/22 1343   Care Cleansed with:;Antimicrobial agent;Soap and water 08/16/22 1343   Dressing Applied;Calcium alginate;Silver;Gauze;Rolled gauze;Compression wrap 08/16/22 1343   Periwound Care Moisture barrier applied 08/16/22 1343   Dressing Change Due 08/17/22 08/16/22 1343         Active Problem List with Overview Notes    Diagnosis Date Noted    Venous ulcer of right leg 05/31/2022               Venous insufficiency of both lower extremities 10/20/2021     With a ulcer on her RLE x 2 months;   Being seen by wound care      Venous stasis ulcer 09/27/2021     infected.      PVD (peripheral vascular disease) 09/27/2021     Last arterial doppler 9/2021  Ankle brachial indices are 1.24 on the right and 1.2 on the left.     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 Leena TRIVEDI top the LAD and SVG to the OM1      COPD (chronic obstructive pulmonary disease)      Requires continuous supplemental oxygen      HHD (hypertensive heart disease)     MI (myocardial infarction)      7/2004      Coronary artery disease      CABG 2004      Cardiomyopathy      EF 35% by LVgram 7/17/2020  35-40 % by echo 3/9/2020      Pulmonary hypertension      Severe by echo 3/9/2020      Rheumatoid arthritis         Plan:   Clean with baby shampoo and water  Apply aloe vesta anti fungal ointment to leg then apply vashe moisten drawtex (cut  larger than wound) cover with dry gauze,Wrap with kerlix and ace wrap from toes to just below knee  Change daily and as needed  Apply large edema wear to left leg  Elevate legs as much as possible  Avoid prolonged standing  Monitor closely for s/s of infection including fever, chills, increase in pain, odor from wound, and increased redness from foot. Go to ER if any complications develop.   Keep leg elevated and avoid pressure on wound.  Diabetes:  Monitor glucose closely. Check fasting glucose and 2 hours after meals. HgA1C goal <7, fasting glucose , and 2 hours after meals <180  Hypertension:  Check blood pressure twice daily, goal <120/80  Diet:   Increase protein intake, avoid fried, fatty foods and foods high in simple carbs.   Vitamins:  Take vitamin C 1000 mg, zinc 50mg, vitamin d 5000 units, and a daily multivitamin. Ernie is a good source of protein and nutrients to aid in wound healing.  Problem List Items Addressed This Visit        Cardiac/Vascular    PVD (peripheral vascular disease)    Overview     Last arterial doppler 9/2021  Ankle brachial indices are 1.24 on the right and 1.2  on the left.           Current Assessment & Plan     Heart Healthy Diet-reduce sodium intake to 1,500  mg/day and limit alcohol  Smoking Cessation - if smoke set goals to quit  Exercise daily  Weight loss -  Maintain healthy weight.  If overweight, set goal to  lose about 5% to 10% of baseline weight within six months  Keep blood pressure well controlled - take medications as prescribed   Ensure diabetes is controlled - diabetic diet, medications as prescribed, check blood glucose routinely              Relevant Orders    VAS US Venous Leg Left       Orthopedic    Venous ulcer of right leg - Primary    Overview                    Current Assessment & Plan     Clean with baby shampoo and water  Apply aloe vesta anti fungal ointment to leg then apply vashe moisten drawtex (cut  larger than wound) cover with dry gauze,Wrap with kerlix and ace wrap from toes to just below knee  Change daily and as needed  Apply large edema wear to left leg  Elevate legs as much as possible  Avoid prolonged standing  Monitor closely for s/s of infection including fever, chills, increase in pain, odor from wound, and increased redness from foot. Go to ER if any complications develop.   Keep leg elevated and avoid pressure on wound.  Diabetes:  Monitor glucose closely. Check fasting glucose and 2 hours after meals. HgA1C goal <7, fasting glucose , and 2 hours after meals <180  Hypertension:  Check blood pressure twice daily, goal <120/80  Diet:   Increase protein intake, avoid fried, fatty foods and foods high in simple carbs.   Vitamins:  Take vitamin C 1000 mg, zinc 50mg, vitamin d 5000 units, and a daily multivitamin. Ernie is a good source of protein and nutrients to aid in wound healing.             Other Visit Diagnoses     Non-pressure chronic ulcer of other part of right lower leg with fat layer exposed              Future Appointments   Date Time Provider Department Center   8/30/2022  1:00 PM MYA Escobar [facility]  [facility]   10/26/2022  2:00 PM Lianet Bryson, ACNP [facility]   11/8/2022  1:30 PM [facility]            Signature:  MYA Escobar  [facility]  [address]  [place] [address]  [phone]    Date of encounter: 8/16/22

## 2022-08-16 ENCOUNTER — OFFICE VISIT (OUTPATIENT)
Dept: WOUND CARE | Facility: CLINIC | Age: 77
End: 2022-08-16
Attending: FAMILY MEDICINE
Payer: COMMERCIAL

## 2022-08-16 VITALS
RESPIRATION RATE: 20 BRPM | HEART RATE: 68 BPM | DIASTOLIC BLOOD PRESSURE: 61 MMHG | TEMPERATURE: 97 F | SYSTOLIC BLOOD PRESSURE: 114 MMHG

## 2022-08-16 DIAGNOSIS — L97.812 NON-PRESSURE CHRONIC ULCER OF OTHER PART OF RIGHT LOWER LEG WITH FAT LAYER EXPOSED: ICD-10-CM

## 2022-08-16 DIAGNOSIS — I83.019 VENOUS ULCER OF RIGHT LEG: Primary | ICD-10-CM

## 2022-08-16 DIAGNOSIS — I73.9 PVD (PERIPHERAL VASCULAR DISEASE): ICD-10-CM

## 2022-08-16 DIAGNOSIS — L97.919 VENOUS ULCER OF RIGHT LEG: Primary | ICD-10-CM

## 2022-08-16 PROCEDURE — 1126F PR PAIN SEVERITY QUANTIFIED, NO PAIN PRESENT: ICD-10-PCS | Mod: CPTII,,, | Performed by: NURSE PRACTITIONER

## 2022-08-16 PROCEDURE — 1160F RVW MEDS BY RX/DR IN RCRD: CPT | Mod: CPTII,,, | Performed by: NURSE PRACTITIONER

## 2022-08-16 PROCEDURE — 4010F PR ACE/ARB THEARPY RXD/TAKEN: ICD-10-PCS | Mod: CPTII,,, | Performed by: NURSE PRACTITIONER

## 2022-08-16 PROCEDURE — 3078F PR MOST RECENT DIASTOLIC BLOOD PRESSURE < 80 MM HG: ICD-10-PCS | Mod: CPTII,,, | Performed by: NURSE PRACTITIONER

## 2022-08-16 PROCEDURE — 1159F MED LIST DOCD IN RCRD: CPT | Mod: CPTII,,, | Performed by: NURSE PRACTITIONER

## 2022-08-16 PROCEDURE — 1126F AMNT PAIN NOTED NONE PRSNT: CPT | Mod: CPTII,,, | Performed by: NURSE PRACTITIONER

## 2022-08-16 PROCEDURE — 3074F SYST BP LT 130 MM HG: CPT | Mod: CPTII,,, | Performed by: NURSE PRACTITIONER

## 2022-08-16 PROCEDURE — 3078F DIAST BP <80 MM HG: CPT | Mod: CPTII,,, | Performed by: NURSE PRACTITIONER

## 2022-08-16 PROCEDURE — 1159F PR MEDICATION LIST DOCUMENTED IN MEDICAL RECORD: ICD-10-PCS | Mod: CPTII,,, | Performed by: NURSE PRACTITIONER

## 2022-08-16 PROCEDURE — 4010F ACE/ARB THERAPY RXD/TAKEN: CPT | Mod: CPTII,,, | Performed by: NURSE PRACTITIONER

## 2022-08-16 PROCEDURE — 3074F PR MOST RECENT SYSTOLIC BLOOD PRESSURE < 130 MM HG: ICD-10-PCS | Mod: CPTII,,, | Performed by: NURSE PRACTITIONER

## 2022-08-16 PROCEDURE — 99214 OFFICE O/P EST MOD 30 MIN: CPT | Mod: S$PBB,,, | Performed by: NURSE PRACTITIONER

## 2022-08-16 PROCEDURE — 1160F PR REVIEW ALL MEDS BY PRESCRIBER/CLIN PHARMACIST DOCUMENTED: ICD-10-PCS | Mod: CPTII,,, | Performed by: NURSE PRACTITIONER

## 2022-08-16 PROCEDURE — 99214 PR OFFICE/OUTPT VISIT, EST, LEVL IV, 30-39 MIN: ICD-10-PCS | Mod: S$PBB,,, | Performed by: NURSE PRACTITIONER

## 2022-08-16 PROCEDURE — 99215 OFFICE O/P EST HI 40 MIN: CPT | Mod: PBBFAC | Performed by: NURSE PRACTITIONER

## 2022-08-16 RX ORDER — PREDNISONE 2.5 MG/1
2.5 TABLET ORAL DAILY
COMMUNITY
Start: 2022-07-27 | End: 2023-11-02 | Stop reason: SDUPTHER

## 2022-08-16 RX ORDER — HYDROXYCHLOROQUINE SULFATE 200 MG/1
200 TABLET, FILM COATED ORAL DAILY
COMMUNITY
Start: 2022-07-27 | End: 2022-11-02 | Stop reason: SDUPTHER

## 2022-08-17 NOTE — ASSESSMENT & PLAN NOTE
Heart Healthy Diet-reduce sodium intake to 1,500  mg/day and limit alcohol  Smoking Cessation - if smoke set goals to quit  Exercise daily  Weight loss -  Maintain healthy weight.  If overweight, set goal to  lose about 5% to 10% of baseline weight within six months  Keep blood pressure well controlled - take medications as prescribed   Ensure diabetes is controlled - diabetic diet, medications as prescribed, check blood glucose routinely

## 2022-09-12 RX ORDER — POTASSIUM CHLORIDE 750 MG/1
TABLET, EXTENDED RELEASE ORAL
Qty: 90 TABLET | Refills: 1 | OUTPATIENT
Start: 2022-09-12

## 2022-09-12 NOTE — TELEPHONE ENCOUNTER
----- Message from Allison Duong sent at 9/12/2022 12:02 PM CDT -----  Patient son called stating that Mrs. Rae need two of her prescription refilled. metoprolol tartrate (LOPRESSOR) 25 MG tablet and potassium chloride SA 10 MEQ tablet

## 2022-09-12 NOTE — TELEPHONE ENCOUNTER
Pt has cancelled 4 appts and has not had f/u from April 2022 visit. Pt will need to schedule appt for refills and f/u.    Call made to pt and son.  No answer on either number. Son's voicemail has a different name in message. No message left.

## 2022-09-13 ENCOUNTER — TELEPHONE (OUTPATIENT)
Dept: FAMILY MEDICINE | Facility: CLINIC | Age: 77
End: 2022-09-13
Payer: COMMERCIAL

## 2022-09-13 NOTE — TELEPHONE ENCOUNTER
----- Message from Mj Mendoza sent at 9/13/2022 12:39 PM CDT -----  Pt has her other medicine but did not get her    potassium chloride SA (KNOEL,KLOR-CON) 10 MEQ tablet    Krishna pathak

## 2022-09-13 NOTE — TELEPHONE ENCOUNTER
Call returned to pt to schedule appt for f/u and refills. No answer on 3 numbers on pt's profile. Left message on home number.

## 2022-09-16 RX ORDER — POTASSIUM CHLORIDE 750 MG/1
10 TABLET, EXTENDED RELEASE ORAL 3 TIMES DAILY
Qty: 30 TABLET | Refills: 0 | Status: SHIPPED | OUTPATIENT
Start: 2022-09-16 | End: 2022-09-21

## 2022-09-21 ENCOUNTER — OFFICE VISIT (OUTPATIENT)
Dept: FAMILY MEDICINE | Facility: CLINIC | Age: 77
End: 2022-09-21
Payer: COMMERCIAL

## 2022-09-21 VITALS
HEART RATE: 71 BPM | SYSTOLIC BLOOD PRESSURE: 120 MMHG | RESPIRATION RATE: 20 BRPM | OXYGEN SATURATION: 93 % | DIASTOLIC BLOOD PRESSURE: 60 MMHG

## 2022-09-21 DIAGNOSIS — E11.65 TYPE 2 DIABETES MELLITUS WITH HYPERGLYCEMIA, WITH LONG-TERM CURRENT USE OF INSULIN: ICD-10-CM

## 2022-09-21 DIAGNOSIS — L30.9 DERMATITIS: ICD-10-CM

## 2022-09-21 DIAGNOSIS — I10 PRIMARY HYPERTENSION: Primary | ICD-10-CM

## 2022-09-21 DIAGNOSIS — M06.9 RHEUMATOID ARTHRITIS, INVOLVING UNSPECIFIED SITE, UNSPECIFIED WHETHER RHEUMATOID FACTOR PRESENT: ICD-10-CM

## 2022-09-21 DIAGNOSIS — Z79.4 TYPE 2 DIABETES MELLITUS WITH HYPERGLYCEMIA, WITH LONG-TERM CURRENT USE OF INSULIN: ICD-10-CM

## 2022-09-21 LAB
ALBUMIN SERPL BCP-MCNC: 3.3 G/DL (ref 3.5–5)
ALP SERPL-CCNC: 105 U/L (ref 55–142)
ALT SERPL W P-5'-P-CCNC: 21 U/L (ref 13–56)
ANION GAP SERPL CALCULATED.3IONS-SCNC: 8 MMOL/L (ref 7–16)
AST SERPL W P-5'-P-CCNC: 16 U/L (ref 15–37)
BASOPHILS # BLD AUTO: 0.03 K/UL (ref 0–0.2)
BASOPHILS NFR BLD AUTO: 0.3 % (ref 0–1)
BILIRUB DIRECT SERPL-MCNC: 0.1 MG/DL (ref 0–0.2)
BILIRUB SERPL-MCNC: 0.4 MG/DL (ref ?–1.2)
BUN SERPL-MCNC: 21 MG/DL (ref 7–18)
BUN/CREAT SERPL: 12 (ref 6–20)
CALCIUM SERPL-MCNC: 9.5 MG/DL (ref 8.5–10.1)
CHLORIDE SERPL-SCNC: 102 MMOL/L (ref 98–107)
CO2 SERPL-SCNC: 33 MMOL/L (ref 21–32)
CREAT SERPL-MCNC: 1.76 MG/DL (ref 0.55–1.02)
DIFFERENTIAL METHOD BLD: ABNORMAL
EGFR (NO RACE VARIABLE) (RUSH/TITUS): 30 ML/MIN/1.73M²
EOSINOPHIL # BLD AUTO: 0.1 K/UL (ref 0–0.5)
EOSINOPHIL NFR BLD AUTO: 0.9 % (ref 1–4)
ERYTHROCYTE [DISTWIDTH] IN BLOOD BY AUTOMATED COUNT: 14.5 % (ref 11.5–14.5)
GLUCOSE SERPL-MCNC: 91 MG/DL (ref 74–106)
HCT VFR BLD AUTO: 33.8 % (ref 38–47)
HGB BLD-MCNC: 10.7 G/DL (ref 12–16)
IMM GRANULOCYTES # BLD AUTO: 0.06 K/UL (ref 0–0.04)
IMM GRANULOCYTES NFR BLD: 0.5 % (ref 0–0.4)
LYMPHOCYTES # BLD AUTO: 0.65 K/UL (ref 1–4.8)
LYMPHOCYTES NFR BLD AUTO: 5.9 % (ref 27–41)
MCH RBC QN AUTO: 24.2 PG (ref 27–31)
MCHC RBC AUTO-ENTMCNC: 31.7 G/DL (ref 32–36)
MCV RBC AUTO: 76.5 FL (ref 80–96)
MONOCYTES # BLD AUTO: 0.42 K/UL (ref 0–0.8)
MONOCYTES NFR BLD AUTO: 3.8 % (ref 2–6)
MPC BLD CALC-MCNC: 11.9 FL (ref 9.4–12.4)
NEUTROPHILS # BLD AUTO: 9.74 K/UL (ref 1.8–7.7)
NEUTROPHILS NFR BLD AUTO: 88.6 % (ref 53–65)
NRBC # BLD AUTO: 0 X10E3/UL
NRBC, AUTO (.00): 0 %
PLATELET # BLD AUTO: 381 K/UL (ref 150–400)
POTASSIUM SERPL-SCNC: 4.8 MMOL/L (ref 3.5–5.1)
PROT SERPL-MCNC: 8.5 G/DL (ref 6.4–8.2)
RBC # BLD AUTO: 4.42 M/UL (ref 4.2–5.4)
SODIUM SERPL-SCNC: 138 MMOL/L (ref 136–145)
WBC # BLD AUTO: 11 K/UL (ref 4.5–11)

## 2022-09-21 PROCEDURE — 80076 HEPATIC FUNCTION PANEL: ICD-10-PCS | Mod: ,,, | Performed by: CLINICAL MEDICAL LABORATORY

## 2022-09-21 PROCEDURE — 3078F PR MOST RECENT DIASTOLIC BLOOD PRESSURE < 80 MM HG: ICD-10-PCS | Mod: ,,, | Performed by: INTERNAL MEDICINE

## 2022-09-21 PROCEDURE — 4010F PR ACE/ARB THEARPY RXD/TAKEN: ICD-10-PCS | Mod: ,,, | Performed by: INTERNAL MEDICINE

## 2022-09-21 PROCEDURE — 83036 HEMOGLOBIN GLYCOSYLATED A1C: CPT | Mod: ,,, | Performed by: CLINICAL MEDICAL LABORATORY

## 2022-09-21 PROCEDURE — 80076 HEPATIC FUNCTION PANEL: CPT | Mod: ,,, | Performed by: CLINICAL MEDICAL LABORATORY

## 2022-09-21 PROCEDURE — 83036 HEMOGLOBIN A1C: ICD-10-PCS | Mod: ,,, | Performed by: CLINICAL MEDICAL LABORATORY

## 2022-09-21 PROCEDURE — 1160F RVW MEDS BY RX/DR IN RCRD: CPT | Mod: ,,, | Performed by: INTERNAL MEDICINE

## 2022-09-21 PROCEDURE — 3074F PR MOST RECENT SYSTOLIC BLOOD PRESSURE < 130 MM HG: ICD-10-PCS | Mod: ,,, | Performed by: INTERNAL MEDICINE

## 2022-09-21 PROCEDURE — 80048 BASIC METABOLIC PANEL: ICD-10-PCS | Mod: ,,, | Performed by: CLINICAL MEDICAL LABORATORY

## 2022-09-21 PROCEDURE — 3074F SYST BP LT 130 MM HG: CPT | Mod: ,,, | Performed by: INTERNAL MEDICINE

## 2022-09-21 PROCEDURE — 1160F PR REVIEW ALL MEDS BY PRESCRIBER/CLIN PHARMACIST DOCUMENTED: ICD-10-PCS | Mod: ,,, | Performed by: INTERNAL MEDICINE

## 2022-09-21 PROCEDURE — 3051F HG A1C>EQUAL 7.0%<8.0%: CPT | Mod: ,,, | Performed by: INTERNAL MEDICINE

## 2022-09-21 PROCEDURE — 4010F ACE/ARB THERAPY RXD/TAKEN: CPT | Mod: ,,, | Performed by: INTERNAL MEDICINE

## 2022-09-21 PROCEDURE — 85025 COMPLETE CBC W/AUTO DIFF WBC: CPT | Mod: ,,, | Performed by: CLINICAL MEDICAL LABORATORY

## 2022-09-21 PROCEDURE — 85025 CBC WITH DIFFERENTIAL: ICD-10-PCS | Mod: ,,, | Performed by: CLINICAL MEDICAL LABORATORY

## 2022-09-21 PROCEDURE — 3051F PR MOST RECENT HEMOGLOBIN A1C LEVEL 7.0 - < 8.0%: ICD-10-PCS | Mod: ,,, | Performed by: INTERNAL MEDICINE

## 2022-09-21 PROCEDURE — G0008 FLU VACCINE - QUADRIVALENT - ADJUVANTED: ICD-10-PCS | Mod: ,,, | Performed by: INTERNAL MEDICINE

## 2022-09-21 PROCEDURE — 99214 PR OFFICE/OUTPT VISIT, EST, LEVL IV, 30-39 MIN: ICD-10-PCS | Mod: ,,, | Performed by: INTERNAL MEDICINE

## 2022-09-21 PROCEDURE — 90694 FLU VACCINE - QUADRIVALENT - ADJUVANTED: ICD-10-PCS | Mod: ,,, | Performed by: INTERNAL MEDICINE

## 2022-09-21 PROCEDURE — 1159F PR MEDICATION LIST DOCUMENTED IN MEDICAL RECORD: ICD-10-PCS | Mod: ,,, | Performed by: INTERNAL MEDICINE

## 2022-09-21 PROCEDURE — 90694 VACC AIIV4 NO PRSRV 0.5ML IM: CPT | Mod: ,,, | Performed by: INTERNAL MEDICINE

## 2022-09-21 PROCEDURE — G0008 ADMIN INFLUENZA VIRUS VAC: HCPCS | Mod: ,,, | Performed by: INTERNAL MEDICINE

## 2022-09-21 PROCEDURE — 1159F MED LIST DOCD IN RCRD: CPT | Mod: ,,, | Performed by: INTERNAL MEDICINE

## 2022-09-21 PROCEDURE — 99214 OFFICE O/P EST MOD 30 MIN: CPT | Mod: ,,, | Performed by: INTERNAL MEDICINE

## 2022-09-21 PROCEDURE — 3078F DIAST BP <80 MM HG: CPT | Mod: ,,, | Performed by: INTERNAL MEDICINE

## 2022-09-21 PROCEDURE — 80048 BASIC METABOLIC PNL TOTAL CA: CPT | Mod: ,,, | Performed by: CLINICAL MEDICAL LABORATORY

## 2022-09-21 RX ORDER — FUROSEMIDE 40 MG/1
40 TABLET ORAL DAILY
Qty: 7 TABLET | Refills: 0 | Status: SHIPPED | OUTPATIENT
Start: 2022-09-21 | End: 2022-10-11 | Stop reason: SDUPTHER

## 2022-09-21 RX ORDER — ZOLPIDEM TARTRATE 5 MG/1
5 TABLET ORAL NIGHTLY PRN
Qty: 1 TABLET | Refills: 0 | Status: SHIPPED | OUTPATIENT
Start: 2022-09-21

## 2022-09-21 RX ORDER — TRIAMCINOLONE ACETONIDE 1 MG/G
CREAM TOPICAL 2 TIMES DAILY
Qty: 454 G | Refills: 1 | Status: SHIPPED | OUTPATIENT
Start: 2022-09-21 | End: 2022-09-21

## 2022-09-21 RX ORDER — POTASSIUM CHLORIDE 600 MG/1
8 TABLET, FILM COATED, EXTENDED RELEASE ORAL DAILY
Qty: 7 TABLET | Refills: 0 | Status: SHIPPED | OUTPATIENT
Start: 2022-09-21 | End: 2022-10-11

## 2022-09-22 ENCOUNTER — TELEPHONE (OUTPATIENT)
Dept: FAMILY MEDICINE | Facility: CLINIC | Age: 77
End: 2022-09-22
Payer: COMMERCIAL

## 2022-09-22 ENCOUNTER — HOSPITAL ENCOUNTER (OUTPATIENT)
Dept: RADIOLOGY | Facility: HOSPITAL | Age: 77
Discharge: HOME | End: 2022-09-22
Attending: NURSE PRACTITIONER
Payer: COMMERCIAL

## 2022-09-22 DIAGNOSIS — I73.9 PVD (PERIPHERAL VASCULAR DISEASE): ICD-10-CM

## 2022-09-22 DIAGNOSIS — E11.65 TYPE 2 DIABETES MELLITUS WITH HYPERGLYCEMIA, WITH LONG-TERM CURRENT USE OF INSULIN: ICD-10-CM

## 2022-09-22 DIAGNOSIS — L97.812 NON-PRESSURE CHRONIC ULCER OF OTHER PART OF RIGHT LOWER LEG WITH FAT LAYER EXPOSED: ICD-10-CM

## 2022-09-22 DIAGNOSIS — I10 PRIMARY HYPERTENSION: Primary | ICD-10-CM

## 2022-09-22 DIAGNOSIS — Z79.4 TYPE 2 DIABETES MELLITUS WITH HYPERGLYCEMIA, WITH LONG-TERM CURRENT USE OF INSULIN: ICD-10-CM

## 2022-09-22 LAB
EST. AVERAGE GLUCOSE BLD GHB EST-MCNC: 147 MG/DL
HBA1C MFR BLD HPLC: 7 % (ref 4.5–6.6)

## 2022-09-22 PROCEDURE — 93925 LOWER EXTREMITY STUDY: CPT | Mod: TC

## 2022-09-22 PROCEDURE — 93925 LOWER EXTREMITY STUDY: CPT | Mod: 26,,, | Performed by: RADIOLOGY

## 2022-09-22 PROCEDURE — 93922 UPR/L XTREMITY ART 2 LEVELS: CPT | Mod: 26,,, | Performed by: RADIOLOGY

## 2022-09-22 PROCEDURE — 93925 US ARTERIAL LOWER EXTREMITY BILAT WITH ABI (XPD): ICD-10-PCS | Mod: 26,,, | Performed by: RADIOLOGY

## 2022-09-22 PROCEDURE — 93922 US ARTERIAL LOWER EXTREMITY BILAT WITH ABI (XPD): ICD-10-PCS | Mod: 26,,, | Performed by: RADIOLOGY

## 2022-09-22 NOTE — TELEPHONE ENCOUNTER
----- Message from Fco Chaparro MD sent at 9/22/2022  2:31 PM CDT -----  Spep and upep in 1 week         Called patient's son to let him know above above order, to come in for lab visit only. Will Rae voiced understanding.

## 2022-09-22 NOTE — PROGRESS NOTES
Subjective:       Patient ID: Delma Rae is a 77 y.o. female.    Chief Complaint: Follow-up and Medication Refill    Patient is here today for a follow up evaluation. Patient blood pressures is stable today on intake, 120/60. Patient is accompanied by family member. Patient has a complaint of bilateral foot swelling. Family member states patient was recommended to discontinue taking Lasix. Will order Lasix 40mg PO qAM #7. Will order Kcl 8 mEq PO QD #7. Lower Extremity Swelling on exam. Patient is requesting medication refills. Will refill today. Patient is requesting flu shot. Will give flu shot today in clinic. Will order lab work today. Will follow in 3 weeks.     Current Medications:    Current Outpatient Medications:     albuterol (PROVENTIL/VENTOLIN HFA) 90 mcg/actuation inhaler, Inhale 2 puffs into the lungs every 4 (four) hours as needed for Wheezing., Disp: , Rfl:     aspirin (ECOTRIN) 81 MG EC tablet, Take 81 mg by mouth once daily., Disp: , Rfl:     atorvastatin (LIPITOR) 40 MG tablet, Take 1 tablet (40 mg total) by mouth once daily., Disp: 90 tablet, Rfl: 1    blood sugar diagnostic (ONETOUCH ULTRA BLUE TEST STRIP) Strp, 1 strip by abdominal subcutaneous route 2 (two) times a day. Check glucose twice a day, Disp: 200 strip, Rfl: 5    brimonidine 0.2% (ALPHAGAN) 0.2 % Drop, Place 1 drop into both eyes 2 (two) times daily., Disp: , Rfl:     fluticasone furoate-vilanteroL (BREO ELLIPTA) 100-25 mcg/dose diskus inhaler, Inhale 1 puff into the lungs daily as needed., Disp: , Rfl:     folic acid (FOLVITE) 1 MG tablet, Take 1 mg by mouth once daily., Disp: , Rfl:     hydrALAZINE (APRESOLINE) 25 MG tablet, Take 1 tablet (25 mg total) by mouth 3 (three) times daily., Disp: 270 tablet, Rfl: 1    hydrOXYchloroQUINE (PLAQUENIL) 200 mg tablet, Take 200 mg by mouth once daily at 6am., Disp: , Rfl:     insulin NPH-insulin regular, 70/30, (NOVOLIN 70/30 U-100 INSULIN) 100 unit/mL (70-30) injection, INJECT 40 UNITS  "UNDER SKIN IN THE MORNING AND 15 IN THE EVENING., Disp: 50 mL, Rfl: 6    insulin syringe-needle U-100 1 mL 31 gauge x 5/16 Syrg, Check blood glucose 4 times a day as needed., Disp: 200 each, Rfl: 3    insulin syringe-needle U-100 1/2 mL 31 gauge x 15/64" Syrg, by Misc.(Non-Drug; Combo Route) route. Use as directed with insulin, Disp: , Rfl:     latanoprost 0.005 % ophthalmic solution, Place 1 drop into both eyes once daily., Disp: , Rfl:     loratadine (CLARITIN) 10 mg tablet, Take 1 tablet (10 mg total) by mouth once daily., Disp: 90 tablet, Rfl: 1    losartan (COZAAR) 100 MG tablet, Take 1 tablet (100 mg total) by mouth once daily., Disp: 30 tablet, Rfl: 5    meloxicam (MOBIC) 7.5 MG tablet, Take 7.5 mg by mouth once daily., Disp: , Rfl:     methotrexate 2.5 MG Tab, Take 2.5 mg by mouth every 7 days. 5 tablets q7 days, Disp: , Rfl:     metoprolol tartrate (LOPRESSOR) 25 MG tablet, TAKE ONE TABLET BY MOUTH THREE TIMES DAILY, Disp: 90 tablet, Rfl: 1    multivitamin/iron/folic acid (CENTRUM ULTRA WOMEN'S ORAL), Take 1 tablet by mouth once daily., Disp: , Rfl:     naproxen (NAPROSYN) 500 MG tablet, Take 500 mg by mouth 2 (two) times daily as needed. , Disp: , Rfl:     omeprazole (PRILOSEC) 20 MG capsule, Take 1 capsule (20 mg total) by mouth once daily., Disp: 90 capsule, Rfl: 1    pantoprazole (PROTONIX) 40 MG tablet, Take 1 tablet (40 mg total) by mouth once daily., Disp: 60 tablet, Rfl: 6    predniSONE (DELTASONE) 2.5 MG tablet, Take 2.5 mg by mouth once daily at 6am., Disp: , Rfl:     torsemide (DEMADEX) 20 MG Tab, TAKE TWO TABLETS BY MOUTH EVERY DAY., Disp: 60 tablet, Rfl: 3    venlafaxine (EFFEXOR-XR) 75 MG 24 hr capsule, Take 1 capsule (75 mg total) by mouth every other day., Disp: 45 capsule, Rfl: 1    amitriptyline (ELAVIL) 10 MG tablet, Take 1 tablet by mouth every evening., Disp: , Rfl:     amLODIPine (NORVASC) 5 MG tablet, Take 1 tablet (5 mg total) by mouth once daily., Disp: 90 tablet, Rfl: 1    " FLUTICASONE PROPIONATE NASL, 1 spray by Nasal route once daily., Disp: , Rfl:     furosemide (LASIX) 40 MG tablet, Take 1 tablet (40 mg total) by mouth once daily., Disp: 7 tablet, Rfl: 0    potassium chloride (KLOR-CON) 8 MEQ TbSR, Take 1 tablet (8 mEq total) by mouth once daily., Disp: 7 tablet, Rfl: 0    pregabalin (LYRICA) 50 MG capsule, Take 1 capsule by mouth 3 (three) times daily., Disp: , Rfl:     triamcinolone acetonide 0.1% (KENALOG) 0.1 % cream, Apply topically 2 (two) times daily. Apply to affected area twice a day as needed, Disp: 454 g, Rfl: 1    zolpidem (AMBIEN) 5 MG Tab, Take 1 tablet (5 mg total) by mouth nightly as needed., Disp: 1 tablet, Rfl: 0    Last Labs:     Office Visit on 09/21/2022   Component Date Value    Sodium 09/21/2022 138     Potassium 09/21/2022 4.8     Chloride 09/21/2022 102     CO2 09/21/2022 33 (H)     Anion Gap 09/21/2022 8     Glucose 09/21/2022 91     BUN 09/21/2022 21 (H)     Creatinine 09/21/2022 1.76 (H)     BUN/Creatinine Ratio 09/21/2022 12     Calcium 09/21/2022 9.5     eGFR 09/21/2022 30 (L)     Hemoglobin A1C 09/21/2022 7.0 (H)     Estimated Average Glucose 09/21/2022 147     Total Protein 09/21/2022 8.5 (H)     Albumin 09/21/2022 3.3 (L)     Bilirubin, Total 09/21/2022 0.4     Bilirubin, Direct 09/21/2022 0.1     AST 09/21/2022 16     ALT 09/21/2022 21     Alk Phos 09/21/2022 105     WBC 09/21/2022 11.00     RBC 09/21/2022 4.42     Hemoglobin 09/21/2022 10.7 (L)     Hematocrit 09/21/2022 33.8 (L)     MCV 09/21/2022 76.5 (L)     MCH 09/21/2022 24.2 (L)     MCHC 09/21/2022 31.7 (L)     RDW 09/21/2022 14.5     Platelet Count 09/21/2022 381     MPV 09/21/2022 11.9     Neutrophils % 09/21/2022 88.6 (H)     Lymphocytes % 09/21/2022 5.9 (L)     Monocytes % 09/21/2022 3.8     Eosinophils % 09/21/2022 0.9 (L)     Basophils % 09/21/2022 0.3     Immature Granulocytes % 09/21/2022 0.5 (H)     nRBC, Auto 09/21/2022 0.0     Neutrophils, Abs 09/21/2022 9.74 (H)     Lymphocytes,  Absolute 09/21/2022 0.65 (L)     Monocytes, Absolute 09/21/2022 0.42     Eosinophils, Absolute 09/21/2022 0.10     Basophils, Absolute 09/21/2022 0.03     Immature Granulocytes, A* 09/21/2022 0.06 (H)     nRBC, Absolute 09/21/2022 0.00     Diff Type 09/21/2022 Auto        Last Imaging:  Mammo Digital Screening Bilat  Addendum: Repeat LCC and LMLO views performed.     No change from the previous study.     IMPRESSION: Bening findings  Narrative: Result:   Mammo Digital Screening Bilat     History:  Patient is 76 y.o. and is seen for a screening mammogram.    Films Compared:  Prior images (if available) were compared.     Findings:  The breasts have scattered areas of fibroglandular density. There is no   evidence of suspicious masses, calcifications, or other abnormal findings.  Impression: Bilateral  There is no mammographic evidence of malignancy.    Need repeat of LCC and LMLO because of motion artifact.    BI-RADS Category:   Overall: 0 - Incomplete: Needs Additional Imaging Evaluation     Recommendation:    Repeat imaging is recommended for technical reasons.    Your estimated lifetime risk of breast cancer (to age 85) based on   Tyrer-Cuzick risk assessment model is Tyrer-Cuzick: 3.65 %. According to   the [employer], patients with a lifetime breast cancer risk   of 20% or higher might benefit from supplemental screening tests.         Review of Systems   Cardiovascular:  Positive for leg swelling.   Musculoskeletal:  Positive for joint swelling.   All other systems reviewed and are negative.      Objective:      Physical Exam  Vitals reviewed.   Constitutional:       Appearance: Normal appearance. She is normal weight.   Cardiovascular:      Rate and Rhythm: Normal rate and regular rhythm.      Pulses: Normal pulses.      Heart sounds: Normal heart sounds.   Pulmonary:      Effort: Pulmonary effort is normal.      Breath sounds: Normal breath sounds.   Abdominal:      General: Abdomen is flat.  Bowel sounds are normal.      Palpations: Abdomen is soft.   Musculoskeletal:         General: Swelling present. Normal range of motion.      Cervical back: Normal range of motion and neck supple.   Skin:     General: Skin is warm and dry.   Neurological:      General: No focal deficit present.      Mental Status: She is alert and oriented to person, place, and time. Mental status is at baseline.       Assessment:       1. Primary hypertension  Basic Metabolic Panel    CBC Auto Differential    Hepatic Function Panel    Basic Metabolic Panel    CBC Auto Differential    Hepatic Function Panel      2. Dermatitis Active triamcinolone acetonide 0.1% (KENALOG) 0.1 % cream      3. Type 2 diabetes mellitus with hyperglycemia, with long-term current use of insulin  Hemoglobin A1C    Hemoglobin A1C      4. Rheumatoid arthritis, involving unspecified site, unspecified whether rheumatoid factor present             Plan:         Delma was seen today for follow-up and medication refill.    Diagnoses and all orders for this visit:    Primary hypertension  -     Basic Metabolic Panel; Future  -     CBC Auto Differential; Future  -     Hepatic Function Panel; Future  -     Basic Metabolic Panel  -     CBC Auto Differential  -     Hepatic Function Panel    Dermatitis  -     triamcinolone acetonide 0.1% (KENALOG) 0.1 % cream; Apply topically 2 (two) times daily. Apply to affected area twice a day as needed  The following orders have not been finalized:  -     triamcinolone acetonide 0.1% (KENALOG) 0.1 % cream    Type 2 diabetes mellitus with hyperglycemia, with long-term current use of insulin  -     Hemoglobin A1C; Future  -     Hemoglobin A1C    Rheumatoid arthritis, involving unspecified site, unspecified whether rheumatoid factor present    Other orders  -     zolpidem (AMBIEN) 5 MG Tab; Take 1 tablet (5 mg total) by mouth nightly as needed.  -     Influenza (FLUAD) - Quadrivalent (Adjuvanted) *Preferred* (65+) (PF)  -      furosemide (LASIX) 40 MG tablet; Take 1 tablet (40 mg total) by mouth once daily.  -     potassium chloride (KLOR-CON) 8 MEQ TbSR; Take 1 tablet (8 mEq total) by mouth once daily.

## 2022-09-22 NOTE — PATIENT INSTRUCTIONS
Delma was seen today for follow-up and medication refill.    Diagnoses and all orders for this visit:    Primary hypertension  -     Basic Metabolic Panel; Future  -     CBC Auto Differential; Future  -     Hepatic Function Panel; Future  -     Basic Metabolic Panel  -     CBC Auto Differential  -     Hepatic Function Panel    Dermatitis  -     triamcinolone acetonide 0.1% (KENALOG) 0.1 % cream; Apply topically 2 (two) times daily. Apply to affected area twice a day as needed  The following orders have not been finalized:  -     triamcinolone acetonide 0.1% (KENALOG) 0.1 % cream    Type 2 diabetes mellitus with hyperglycemia, with long-term current use of insulin  -     Hemoglobin A1C; Future  -     Hemoglobin A1C    Rheumatoid arthritis, involving unspecified site, unspecified whether rheumatoid factor present    Other orders  -     zolpidem (AMBIEN) 5 MG Tab; Take 1 tablet (5 mg total) by mouth nightly as needed.  -     Influenza (FLUAD) - Quadrivalent (Adjuvanted) *Preferred* (65+) (PF)  -     furosemide (LASIX) 40 MG tablet; Take 1 tablet (40 mg total) by mouth once daily.  -     potassium chloride (KLOR-CON) 8 MEQ TbSR; Take 1 tablet (8 mEq total) by mouth once daily.

## 2022-09-25 RX ORDER — TRIAMCINOLONE ACETONIDE 1 MG/G
CREAM TOPICAL 2 TIMES DAILY
Qty: 454 G | Refills: 1 | Status: SHIPPED | OUTPATIENT
Start: 2022-09-25 | End: 2023-03-06 | Stop reason: SDUPTHER

## 2022-10-11 ENCOUNTER — OFFICE VISIT (OUTPATIENT)
Dept: FAMILY MEDICINE | Facility: CLINIC | Age: 77
End: 2022-10-11
Payer: COMMERCIAL

## 2022-10-11 VITALS
OXYGEN SATURATION: 97 % | SYSTOLIC BLOOD PRESSURE: 124 MMHG | DIASTOLIC BLOOD PRESSURE: 66 MMHG | HEART RATE: 70 BPM | WEIGHT: 219.63 LBS | HEIGHT: 67 IN | RESPIRATION RATE: 18 BRPM | TEMPERATURE: 97 F | BODY MASS INDEX: 34.47 KG/M2

## 2022-10-11 DIAGNOSIS — R60.0 BILATERAL LEG EDEMA: ICD-10-CM

## 2022-10-11 DIAGNOSIS — M25.474 BILATERAL SWELLING OF FEET AND ANKLES: ICD-10-CM

## 2022-10-11 DIAGNOSIS — J44.9 CHRONIC OBSTRUCTIVE PULMONARY DISEASE, UNSPECIFIED COPD TYPE: ICD-10-CM

## 2022-10-11 DIAGNOSIS — M25.471 BILATERAL SWELLING OF FEET AND ANKLES: ICD-10-CM

## 2022-10-11 DIAGNOSIS — M25.475 BILATERAL SWELLING OF FEET AND ANKLES: ICD-10-CM

## 2022-10-11 DIAGNOSIS — M25.472 BILATERAL SWELLING OF FEET AND ANKLES: ICD-10-CM

## 2022-10-11 DIAGNOSIS — I10 PRIMARY HYPERTENSION: Primary | ICD-10-CM

## 2022-10-11 PROCEDURE — 1126F PR PAIN SEVERITY QUANTIFIED, NO PAIN PRESENT: ICD-10-PCS | Mod: ,,, | Performed by: INTERNAL MEDICINE

## 2022-10-11 PROCEDURE — 3078F DIAST BP <80 MM HG: CPT | Mod: ,,, | Performed by: INTERNAL MEDICINE

## 2022-10-11 PROCEDURE — 3288F FALL RISK ASSESSMENT DOCD: CPT | Mod: ,,, | Performed by: INTERNAL MEDICINE

## 2022-10-11 PROCEDURE — 3078F PR MOST RECENT DIASTOLIC BLOOD PRESSURE < 80 MM HG: ICD-10-PCS | Mod: ,,, | Performed by: INTERNAL MEDICINE

## 2022-10-11 PROCEDURE — 1101F PT FALLS ASSESS-DOCD LE1/YR: CPT | Mod: ,,, | Performed by: INTERNAL MEDICINE

## 2022-10-11 PROCEDURE — 1160F RVW MEDS BY RX/DR IN RCRD: CPT | Mod: ,,, | Performed by: INTERNAL MEDICINE

## 2022-10-11 PROCEDURE — 4010F ACE/ARB THERAPY RXD/TAKEN: CPT | Mod: ,,, | Performed by: INTERNAL MEDICINE

## 2022-10-11 PROCEDURE — 99214 OFFICE O/P EST MOD 30 MIN: CPT | Mod: ,,, | Performed by: INTERNAL MEDICINE

## 2022-10-11 PROCEDURE — 3288F PR FALLS RISK ASSESSMENT DOCUMENTED: ICD-10-PCS | Mod: ,,, | Performed by: INTERNAL MEDICINE

## 2022-10-11 PROCEDURE — 1101F PR PT FALLS ASSESS DOC 0-1 FALLS W/OUT INJ PAST YR: ICD-10-PCS | Mod: ,,, | Performed by: INTERNAL MEDICINE

## 2022-10-11 PROCEDURE — 1159F MED LIST DOCD IN RCRD: CPT | Mod: ,,, | Performed by: INTERNAL MEDICINE

## 2022-10-11 PROCEDURE — 3074F SYST BP LT 130 MM HG: CPT | Mod: ,,, | Performed by: INTERNAL MEDICINE

## 2022-10-11 PROCEDURE — 3051F HG A1C>EQUAL 7.0%<8.0%: CPT | Mod: ,,, | Performed by: INTERNAL MEDICINE

## 2022-10-11 PROCEDURE — 4010F PR ACE/ARB THEARPY RXD/TAKEN: ICD-10-PCS | Mod: ,,, | Performed by: INTERNAL MEDICINE

## 2022-10-11 PROCEDURE — 3074F PR MOST RECENT SYSTOLIC BLOOD PRESSURE < 130 MM HG: ICD-10-PCS | Mod: ,,, | Performed by: INTERNAL MEDICINE

## 2022-10-11 PROCEDURE — 99214 PR OFFICE/OUTPT VISIT, EST, LEVL IV, 30-39 MIN: ICD-10-PCS | Mod: ,,, | Performed by: INTERNAL MEDICINE

## 2022-10-11 PROCEDURE — 1160F PR REVIEW ALL MEDS BY PRESCRIBER/CLIN PHARMACIST DOCUMENTED: ICD-10-PCS | Mod: ,,, | Performed by: INTERNAL MEDICINE

## 2022-10-11 PROCEDURE — 1159F PR MEDICATION LIST DOCUMENTED IN MEDICAL RECORD: ICD-10-PCS | Mod: ,,, | Performed by: INTERNAL MEDICINE

## 2022-10-11 PROCEDURE — 1126F AMNT PAIN NOTED NONE PRSNT: CPT | Mod: ,,, | Performed by: INTERNAL MEDICINE

## 2022-10-11 PROCEDURE — 3051F PR MOST RECENT HEMOGLOBIN A1C LEVEL 7.0 - < 8.0%: ICD-10-PCS | Mod: ,,, | Performed by: INTERNAL MEDICINE

## 2022-10-11 NOTE — PROGRESS NOTES
Subjective:       Patient ID: Delma Rae is a 77 y.o. female.    Chief Complaint: Hypertension and Medication Refill    Patient is here today for check up evaluation. Patient has severe bilateral foot, ankle, and leg edema. Patient and son states she has been out of Torsemide. Will refill today. Will also refill Lasix 40 mg PO BID and KCl 10 mEq PO BID. Will also stop Norvasc as it can cause snoring. Crackles heard in lungs on exam. Will order chest xray and bmp to be done in 2 weeks. Will follow in 2 weeks.     Current Medications:    Current Outpatient Medications:     albuterol (PROVENTIL/VENTOLIN HFA) 90 mcg/actuation inhaler, Inhale 2 puffs into the lungs every 4 (four) hours as needed for Wheezing., Disp: , Rfl:     amitriptyline (ELAVIL) 10 MG tablet, Take 1 tablet by mouth every evening., Disp: , Rfl:     aspirin (ECOTRIN) 81 MG EC tablet, Take 81 mg by mouth once daily., Disp: , Rfl:     atorvastatin (LIPITOR) 40 MG tablet, Take 1 tablet (40 mg total) by mouth once daily., Disp: 90 tablet, Rfl: 1    blood sugar diagnostic (ONETOUCH ULTRA BLUE TEST STRIP) Strp, 1 strip by abdominal subcutaneous route 2 (two) times a day. Check glucose twice a day, Disp: 200 strip, Rfl: 5    brimonidine 0.2% (ALPHAGAN) 0.2 % Drop, Place 1 drop into both eyes 2 (two) times daily., Disp: , Rfl:     fluticasone furoate-vilanteroL (BREO ELLIPTA) 100-25 mcg/dose diskus inhaler, Inhale 1 puff into the lungs daily as needed., Disp: , Rfl:     FLUTICASONE PROPIONATE NASL, 1 spray by Nasal route once daily., Disp: , Rfl:     folic acid (FOLVITE) 1 MG tablet, Take 1 mg by mouth once daily., Disp: , Rfl:     furosemide (LASIX) 40 MG tablet, Take 1 tablet (40 mg total) by mouth 2 (two) times a day., Disp: 60 tablet, Rfl: 0    hydrALAZINE (APRESOLINE) 25 MG tablet, Take 1 tablet (25 mg total) by mouth 3 (three) times daily., Disp: 270 tablet, Rfl: 1    hydrOXYchloroQUINE (PLAQUENIL) 200 mg tablet, Take 200 mg by mouth once daily at  "6am., Disp: , Rfl:     insulin NPH-insulin regular, 70/30, (NOVOLIN 70/30 U-100 INSULIN) 100 unit/mL (70-30) injection, INJECT 40 UNITS UNDER SKIN IN THE MORNING AND 15 IN THE EVENING., Disp: 50 mL, Rfl: 6    insulin syringe-needle U-100 1 mL 31 gauge x 5/16 Syrg, Check blood glucose 4 times a day as needed., Disp: 200 each, Rfl: 3    insulin syringe-needle U-100 1/2 mL 31 gauge x 15/64" Syrg, by Misc.(Non-Drug; Combo Route) route. Use as directed with insulin, Disp: , Rfl:     latanoprost 0.005 % ophthalmic solution, Place 1 drop into both eyes once daily., Disp: , Rfl:     loratadine (CLARITIN) 10 mg tablet, Take 1 tablet (10 mg total) by mouth once daily., Disp: 90 tablet, Rfl: 1    losartan (COZAAR) 100 MG tablet, Take 1 tablet (100 mg total) by mouth once daily., Disp: 30 tablet, Rfl: 5    meloxicam (MOBIC) 7.5 MG tablet, Take 1 tablet (7.5 mg total) by mouth once daily., Disp: 30 tablet, Rfl: 1    methotrexate 2.5 MG Tab, Take 2.5 mg by mouth every 7 days. 5 tablets q7 days, Disp: , Rfl:     metoprolol tartrate (LOPRESSOR) 25 MG tablet, TAKE ONE TABLET BY MOUTH THREE TIMES DAILY, Disp: 90 tablet, Rfl: 1    multivitamin/iron/folic acid (CENTRUM ULTRA WOMEN'S ORAL), Take 1 tablet by mouth once daily., Disp: , Rfl:     naproxen (NAPROSYN) 500 MG tablet, Take 1 tablet (500 mg total) by mouth 2 (two) times daily as needed., Disp: 20 tablet, Rfl: 1    omeprazole (PRILOSEC) 20 MG capsule, Take 1 capsule (20 mg total) by mouth once daily., Disp: 90 capsule, Rfl: 1    pantoprazole (PROTONIX) 40 MG tablet, Take 1 tablet (40 mg total) by mouth once daily., Disp: 60 tablet, Rfl: 6    potassium chloride SA (K-DUR,KLOR-CON M) 10 MEQ tablet, Take 1 tablet (10 mEq total) by mouth 2 (two) times daily., Disp: 60 tablet, Rfl: 0    predniSONE (DELTASONE) 2.5 MG tablet, Take 2.5 mg by mouth once daily at 6am., Disp: , Rfl:     pregabalin (LYRICA) 50 MG capsule, Take 1 capsule by mouth 3 (three) times daily., Disp: , Rfl:     " torsemide (DEMADEX) 20 MG Tab, Take 2 tablets (40 mg total) by mouth every morning., Disp: 60 tablet, Rfl: 1    triamcinolone acetonide 0.1% (KENALOG) 0.1 % cream, Apply topically 2 (two) times daily. Apply to affected area twice a day as needed, Disp: 454 g, Rfl: 1    venlafaxine (EFFEXOR-XR) 75 MG 24 hr capsule, Take 1 capsule (75 mg total) by mouth every other day., Disp: 45 capsule, Rfl: 1    zolpidem (AMBIEN) 5 MG Tab, Take 1 tablet (5 mg total) by mouth nightly as needed., Disp: 1 tablet, Rfl: 0    Last Labs:     Office Visit on 09/21/2022   Component Date Value    Sodium 09/21/2022 138     Potassium 09/21/2022 4.8     Chloride 09/21/2022 102     CO2 09/21/2022 33 (H)     Anion Gap 09/21/2022 8     Glucose 09/21/2022 91     BUN 09/21/2022 21 (H)     Creatinine 09/21/2022 1.76 (H)     BUN/Creatinine Ratio 09/21/2022 12     Calcium 09/21/2022 9.5     eGFR 09/21/2022 30 (L)     Hemoglobin A1C 09/21/2022 7.0 (H)     Estimated Average Glucose 09/21/2022 147     Total Protein 09/21/2022 8.5 (H)     Albumin 09/21/2022 3.3 (L)     Bilirubin, Total 09/21/2022 0.4     Bilirubin, Direct 09/21/2022 0.1     AST 09/21/2022 16     ALT 09/21/2022 21     Alk Phos 09/21/2022 105     WBC 09/21/2022 11.00     RBC 09/21/2022 4.42     Hemoglobin 09/21/2022 10.7 (L)     Hematocrit 09/21/2022 33.8 (L)     MCV 09/21/2022 76.5 (L)     MCH 09/21/2022 24.2 (L)     MCHC 09/21/2022 31.7 (L)     RDW 09/21/2022 14.5     Platelet Count 09/21/2022 381     MPV 09/21/2022 11.9     Neutrophils % 09/21/2022 88.6 (H)     Lymphocytes % 09/21/2022 5.9 (L)     Monocytes % 09/21/2022 3.8     Eosinophils % 09/21/2022 0.9 (L)     Basophils % 09/21/2022 0.3     Immature Granulocytes % 09/21/2022 0.5 (H)     nRBC, Auto 09/21/2022 0.0     Neutrophils, Abs 09/21/2022 9.74 (H)     Lymphocytes, Absolute 09/21/2022 0.65 (L)     Monocytes, Absolute 09/21/2022 0.42     Eosinophils, Absolute 09/21/2022 0.10     Basophils, Absolute 09/21/2022 0.03     Immature  Granulocytes, A* 09/21/2022 0.06 (H)     nRBC, Absolute 09/21/2022 0.00     Diff Type 09/21/2022 Auto        Last Imaging:  US Lower Extrem Arteries Bilat with NILDA (xpd)  Narrative: EXAMINATION:  US ARTERIAL LOWER EXTREMITY BILAT WITH NILDA (XPD)    CLINICAL HISTORY:  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  Peripheral vascular disease    TECHNIQUE:  Grayscale, pulsed-wave Doppler, and color flow Doppler images of both lower extremities were obtained. Real-time ultrasound images are captured and archived. Blood pressures were also obtained at the upper arms and lower extremities bilaterally.    COMPARISON:  October 7, 2021 images available    FINDINGS:  Right brachial pressure: 136 mmHg    Right low thigh NILDA: Could not obtain pressure at this level    Right posterior tibialis NILDA: 1.27    Right dorsalis pedis NILDA: 1.15    Right digit NILDA: 0.93    Left brachial pressure: 135 mmHg    Left low thigh NILDA: Could not obtain pressure at this level    Left posterior tibialis NILDA: 1.09    Left dorsalis pedis NILDA: 1.19    Left digit NILDA: 0.83    PVR waveforms are mildly abnormal at the posterior tibial level on the left.    Peak systolic arterial velocities are as follows:    Right CFA: 220 cm/s    Right profundus: 118 cm/s    Right SFA proximal: 144 cm/s    Right SFA mid: 107 cm/s    Right SFA distal: 57 cm/s    Right popliteal proximal: 100 cm/s    Right popliteal mid: 55 cm/s    Right popliteal distal: 57 cm/s    Right PTA: 43 cm/s    Right DPA: 39 cm/s    Left CFA: 202 cm/s    Left profundus: 127 cm/s    Left SFA proximal: 125 cm/s    Left SFA mid: 116 cm/s    Left SFA distal: 83 cm/s    Left popliteal proximal: 88 cm/s    Left popliteal mid: 92 cm/s    Left popliteal distal: 122 cm/s    Left PTA: 54 cm/s    Left DPA: 91 cm/s    No areas of occlusion are identified on the color Doppler images.    Analysis at the SFA level was suboptimal due to body habitus.  Other than the SFA  level, waveforms are generally biphasic.  Impression: No areas of occlusion    No doubling of peak systolic velocity between adjacent stations to specifically suggest high-grade stenosis of a focal nature    Ankle brachial indices measure normal at 1.27 right and 1.19 left    NILDA index values    >1.0 = normal    >0.9-1.0= may be within normal limits    0.8 and-0.9 = mild arterial disease    0.5-0.8= claudication moderate disease    <0.5 = rest pain severe arterial disease    The Ultrasound images were captured and stored.    Place of service: [facility]    Electronically signed by: Daniel Watt  Date:    09/22/2022  Time:    16:03         Review of Systems   Respiratory:  Positive for wheezing.    Cardiovascular:  Positive for leg swelling.   All other systems reviewed and are negative.      Objective:      Physical Exam  Vitals reviewed.   Constitutional:       Appearance: Normal appearance.   Cardiovascular:      Rate and Rhythm: Normal rate and regular rhythm.      Pulses: Normal pulses.      Heart sounds: Normal heart sounds.   Pulmonary:      Effort: Pulmonary effort is normal.      Breath sounds: Normal breath sounds.   Abdominal:      General: Abdomen is flat. Bowel sounds are normal.      Palpations: Abdomen is soft.   Musculoskeletal:         General: Normal range of motion.      Cervical back: Normal range of motion and neck supple.      Right lower leg: Edema present.      Left lower leg: Edema present.   Skin:     General: Skin is warm and dry.   Neurological:      General: No focal deficit present.      Mental Status: She is alert and oriented to person, place, and time. Mental status is at baseline.       Assessment:       1. Primary hypertension  X-Ray Chest PA And Lateral    Basic Metabolic Panel      2. Bilateral swelling of feet and ankles        3. Bilateral leg edema  X-Ray Chest PA And Lateral      4. Chronic obstructive pulmonary disease, unspecified COPD type  X-Ray Chest PA  And Lateral           Plan:         Delma was seen today for hypertension and medication refill.    Diagnoses and all orders for this visit:    Primary hypertension  -     X-Ray Chest PA And Lateral; Future  -     Basic Metabolic Panel; Future    Bilateral swelling of feet and ankles    Bilateral leg edema  -     X-Ray Chest PA And Lateral; Future    Chronic obstructive pulmonary disease, unspecified COPD type  -     X-Ray Chest PA And Lateral; Future    Other orders  -     naproxen (NAPROSYN) 500 MG tablet; Take 1 tablet (500 mg total) by mouth 2 (two) times daily as needed.  -     furosemide (LASIX) 40 MG tablet; Take 1 tablet (40 mg total) by mouth 2 (two) times a day.  -     torsemide (DEMADEX) 20 MG Tab; Take 2 tablets (40 mg total) by mouth every morning.  -     meloxicam (MOBIC) 7.5 MG tablet; Take 1 tablet (7.5 mg total) by mouth once daily.  -     potassium chloride SA (K-DUR,KLOR-CON M) 10 MEQ tablet; Take 1 tablet (10 mEq total) by mouth 2 (two) times daily.

## 2022-10-12 RX ORDER — POTASSIUM CHLORIDE 750 MG/1
10 TABLET, EXTENDED RELEASE ORAL 2 TIMES DAILY
Qty: 60 TABLET | Refills: 0 | Status: SHIPPED | OUTPATIENT
Start: 2022-10-12 | End: 2022-11-02 | Stop reason: SDUPTHER

## 2022-10-12 RX ORDER — TORSEMIDE 20 MG/1
40 TABLET ORAL EVERY MORNING
Qty: 60 TABLET | Refills: 1 | Status: SHIPPED | OUTPATIENT
Start: 2022-10-12 | End: 2022-11-02 | Stop reason: SDUPTHER

## 2022-10-12 RX ORDER — MELOXICAM 7.5 MG/1
7.5 TABLET ORAL DAILY
Qty: 30 TABLET | Refills: 1 | Status: SHIPPED | OUTPATIENT
Start: 2022-10-12

## 2022-10-12 RX ORDER — FUROSEMIDE 40 MG/1
40 TABLET ORAL 2 TIMES DAILY
Qty: 60 TABLET | Refills: 0 | Status: SHIPPED | OUTPATIENT
Start: 2022-10-12 | End: 2023-02-08 | Stop reason: SDUPTHER

## 2022-10-12 RX ORDER — NAPROXEN 500 MG/1
500 TABLET ORAL 2 TIMES DAILY PRN
Qty: 20 TABLET | Refills: 1 | Status: SHIPPED | OUTPATIENT
Start: 2022-10-12 | End: 2023-04-11 | Stop reason: SDUPTHER

## 2022-10-12 NOTE — PATIENT INSTRUCTIONS
Delma was seen today for hypertension and medication refill.    Diagnoses and all orders for this visit:    Primary hypertension  -     X-Ray Chest PA And Lateral; Future  -     Basic Metabolic Panel; Future    Bilateral swelling of feet and ankles    Bilateral leg edema  -     X-Ray Chest PA And Lateral; Future    Chronic obstructive pulmonary disease, unspecified COPD type  -     X-Ray Chest PA And Lateral; Future    Other orders  -     naproxen (NAPROSYN) 500 MG tablet; Take 1 tablet (500 mg total) by mouth 2 (two) times daily as needed.  -     furosemide (LASIX) 40 MG tablet; Take 1 tablet (40 mg total) by mouth 2 (two) times a day.  -     torsemide (DEMADEX) 20 MG Tab; Take 2 tablets (40 mg total) by mouth every morning.  -     meloxicam (MOBIC) 7.5 MG tablet; Take 1 tablet (7.5 mg total) by mouth once daily.  -     potassium chloride SA (K-DUR,KLOR-CON M) 10 MEQ tablet; Take 1 tablet (10 mEq total) by mouth 2 (two) times daily.

## 2022-10-26 ENCOUNTER — TELEPHONE (OUTPATIENT)
Dept: WOUND CARE | Facility: CLINIC | Age: 77
End: 2022-10-26
Payer: COMMERCIAL

## 2022-10-26 NOTE — TELEPHONE ENCOUNTER
Received message from  stating that the patient's son called with questions about dressings/if the patient still needed to dress her wound. Returned phone call, but patient/family did not answer. Left voicemail. Patient had to cancel her appointment scheduled for yesterday (10/25/2022) and rescheduled appointment for next week on 11/3/2022. Will attempt to contact again, but patient is to continue to dress her wounds as ordered.

## 2022-11-02 ENCOUNTER — OFFICE VISIT (OUTPATIENT)
Dept: FAMILY MEDICINE | Facility: CLINIC | Age: 77
End: 2022-11-02
Payer: COMMERCIAL

## 2022-11-02 VITALS
HEART RATE: 68 BPM | BODY MASS INDEX: 33.04 KG/M2 | WEIGHT: 210.5 LBS | TEMPERATURE: 97 F | DIASTOLIC BLOOD PRESSURE: 63 MMHG | HEIGHT: 67 IN | SYSTOLIC BLOOD PRESSURE: 120 MMHG | OXYGEN SATURATION: 95 % | RESPIRATION RATE: 18 BRPM

## 2022-11-02 DIAGNOSIS — J30.1 SEASONAL ALLERGIC RHINITIS DUE TO POLLEN: ICD-10-CM

## 2022-11-02 DIAGNOSIS — N18.4 CKD (CHRONIC KIDNEY DISEASE), STAGE IV: ICD-10-CM

## 2022-11-02 DIAGNOSIS — D64.9 ANEMIA, UNSPECIFIED TYPE: ICD-10-CM

## 2022-11-02 DIAGNOSIS — E08.00 DIABETES MELLITUS DUE TO UNDERLYING CONDITION WITH HYPEROSMOLARITY WITHOUT COMA, WITHOUT LONG-TERM CURRENT USE OF INSULIN: ICD-10-CM

## 2022-11-02 DIAGNOSIS — I50.9 CONGESTIVE HEART FAILURE, UNSPECIFIED HF CHRONICITY, UNSPECIFIED HEART FAILURE TYPE: Primary | ICD-10-CM

## 2022-11-02 DIAGNOSIS — K21.9 GASTROESOPHAGEAL REFLUX DISEASE, UNSPECIFIED WHETHER ESOPHAGITIS PRESENT: ICD-10-CM

## 2022-11-02 PROCEDURE — 1159F MED LIST DOCD IN RCRD: CPT | Mod: ,,, | Performed by: INTERNAL MEDICINE

## 2022-11-02 PROCEDURE — 3074F PR MOST RECENT SYSTOLIC BLOOD PRESSURE < 130 MM HG: ICD-10-PCS | Mod: ,,, | Performed by: INTERNAL MEDICINE

## 2022-11-02 PROCEDURE — 4010F ACE/ARB THERAPY RXD/TAKEN: CPT | Mod: ,,, | Performed by: INTERNAL MEDICINE

## 2022-11-02 PROCEDURE — 3078F DIAST BP <80 MM HG: CPT | Mod: ,,, | Performed by: INTERNAL MEDICINE

## 2022-11-02 PROCEDURE — 1160F PR REVIEW ALL MEDS BY PRESCRIBER/CLIN PHARMACIST DOCUMENTED: ICD-10-PCS | Mod: ,,, | Performed by: INTERNAL MEDICINE

## 2022-11-02 PROCEDURE — 3288F FALL RISK ASSESSMENT DOCD: CPT | Mod: ,,, | Performed by: INTERNAL MEDICINE

## 2022-11-02 PROCEDURE — 3051F PR MOST RECENT HEMOGLOBIN A1C LEVEL 7.0 - < 8.0%: ICD-10-PCS | Mod: ,,, | Performed by: INTERNAL MEDICINE

## 2022-11-02 PROCEDURE — 3051F HG A1C>EQUAL 7.0%<8.0%: CPT | Mod: ,,, | Performed by: INTERNAL MEDICINE

## 2022-11-02 PROCEDURE — 99214 PR OFFICE/OUTPT VISIT, EST, LEVL IV, 30-39 MIN: ICD-10-PCS | Mod: ,,, | Performed by: INTERNAL MEDICINE

## 2022-11-02 PROCEDURE — 1159F PR MEDICATION LIST DOCUMENTED IN MEDICAL RECORD: ICD-10-PCS | Mod: ,,, | Performed by: INTERNAL MEDICINE

## 2022-11-02 PROCEDURE — 3074F SYST BP LT 130 MM HG: CPT | Mod: ,,, | Performed by: INTERNAL MEDICINE

## 2022-11-02 PROCEDURE — 1160F RVW MEDS BY RX/DR IN RCRD: CPT | Mod: ,,, | Performed by: INTERNAL MEDICINE

## 2022-11-02 PROCEDURE — 3288F PR FALLS RISK ASSESSMENT DOCUMENTED: ICD-10-PCS | Mod: ,,, | Performed by: INTERNAL MEDICINE

## 2022-11-02 PROCEDURE — 1101F PR PT FALLS ASSESS DOC 0-1 FALLS W/OUT INJ PAST YR: ICD-10-PCS | Mod: ,,, | Performed by: INTERNAL MEDICINE

## 2022-11-02 PROCEDURE — 3078F PR MOST RECENT DIASTOLIC BLOOD PRESSURE < 80 MM HG: ICD-10-PCS | Mod: ,,, | Performed by: INTERNAL MEDICINE

## 2022-11-02 PROCEDURE — 1126F AMNT PAIN NOTED NONE PRSNT: CPT | Mod: ,,, | Performed by: INTERNAL MEDICINE

## 2022-11-02 PROCEDURE — 99214 OFFICE O/P EST MOD 30 MIN: CPT | Mod: ,,, | Performed by: INTERNAL MEDICINE

## 2022-11-02 PROCEDURE — 1101F PT FALLS ASSESS-DOCD LE1/YR: CPT | Mod: ,,, | Performed by: INTERNAL MEDICINE

## 2022-11-02 PROCEDURE — 1126F PR PAIN SEVERITY QUANTIFIED, NO PAIN PRESENT: ICD-10-PCS | Mod: ,,, | Performed by: INTERNAL MEDICINE

## 2022-11-02 PROCEDURE — 4010F PR ACE/ARB THEARPY RXD/TAKEN: ICD-10-PCS | Mod: ,,, | Performed by: INTERNAL MEDICINE

## 2022-11-02 RX ORDER — SYRINGE AND NEEDLE,INSULIN,1ML 31GX15/64"
1 SYRINGE, EMPTY DISPOSABLE MISCELLANEOUS 2 TIMES DAILY
Qty: 200 EACH | Refills: 3 | Status: CANCELLED | OUTPATIENT
Start: 2022-11-02

## 2022-11-02 NOTE — PROGRESS NOTES
MYA Escobar   [facility]  [address] [facility] [address]  [phone]      PATIENT NAME: Delma Rae  : 1945  DATE: 11/3/22  MRN: [id number]      Billing Provider: MYA Escobar  Level of Service:   Patient PCP Information       Provider PCP Type    Fco Chaparro MD General            Reason for Visit / Chief Complaint: Non-healing Wound Follow Up (RLE)       History of Present Illness / Problem Focused Workflow     Delma Rae is a 76 y.o. female who presents to clinic for follow up on chronic-non healing wound on the right lower leg. Patient is compliant with current treatment plan. Skin to right lower extremity is macerated from weeping due to edema. Change current POC to prevent fragile skin from peeling. Moderate amount of serosanguinous drainage noted. Left lower extremity continues to have significant edema. Left extremity is twiced the size of RLE. Denies pain in left lower extremity.  Arterial doppler reviewed today, No areas of occlusion. No doubling of peak systolic velocity between adjacent stations to specifically suggest high-grade stenosis of a focal nature. Ankle brachial indices measure normal at 1.27 right and 1.19 left. Significant PMH of CHF, COPD, hypertension, MI, rheumatoid arthritis, hyperlipidemia, GERD, and diabetes. Pertinent factors that delay wound healing include multiple co-morbidities, poor vascular supply, diabetes, edema, heavy drainage, excessive wound moisture and macerated tissue, large surface area, advanced age and fragile skin. Denies fever and chills.      Review of Systems     Review of Systems   Constitutional:  Positive for activity change. Negative for chills and fever.   Respiratory:  Negative for chest tightness and shortness of breath.    Cardiovascular:  Positive for leg swelling. Negative for chest pain and palpitations.   Musculoskeletal:  Positive for arthralgias and  joint swelling.   Skin:  Positive for wound.        See wound assessment   Neurological:  Positive for weakness and numbness.   Psychiatric/Behavioral:  Negative for agitation, behavioral problems, confusion and self-injury.      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 Chaidez    GERD (gastroesophageal reflux disease)     HHD (hypertensive heart disease)     Hyperlipidemia     Hypertension     MI (myocardial infarction)     OA (osteoarthritis)     Rheumatoid arthritis        Past Surgical History:   Procedure Laterality Date    HYSTERECTOMY      OOPHORECTOMY      VAGINAL DELIVERY      x 1       Social History  Ms. Delma Rae  reports that she has never smoked. She has never used smokeless tobacco. She reports that she does not drink alcohol and does not use drugs.    Family History  Ms.'s Delma Rae family history includes Cancer in her brother and father; Coronary artery disease in her brother and sister; Diabetes in her father and mother; Hypertension in her mother and son; Prostate cancer in her father.    Medications and Allergies     Medications  Outpatient Medications Marked as Taking for the 11/3/22 encounter (Office Visit) with MYA Escobar   Medication Sig Dispense Refill    albuterol (PROVENTIL/VENTOLIN HFA) 90 mcg/actuation inhaler Inhale 2 puffs into the lungs every 4 (four) hours as needed for Wheezing.      amitriptyline (ELAVIL) 10 MG tablet Take 1 tablet by mouth every evening.      aspirin (ECOTRIN) 81 MG EC tablet Take 81 mg by mouth once daily.      atorvastatin (LIPITOR) 40 MG tablet Take 1 tablet (40 mg total) by mouth once  "daily. 90 tablet 1    blood sugar diagnostic (ONETOUCH ULTRA BLUE TEST STRIP) Strp 1 strip by Other route 4 (four) times daily before meals and nightly. Dx e11.9 200 strip 5    brimonidine 0.2% (ALPHAGAN) 0.2 % Drop Place 1 drop into both eyes 2 (two) times daily.      ferrous sulfate 325 (65 FE) MG EC tablet Take 1 tablet (325 mg total) by mouth once daily. 90 tablet 1    fluticasone furoate-vilanteroL (BREO ELLIPTA) 100-25 mcg/dose diskus inhaler Inhale 1 puff into the lungs daily as needed.      FLUTICASONE PROPIONATE NASL 1 spray by Nasal route once daily.      folic acid (FOLVITE) 1 MG tablet Take 1 mg by mouth once daily.      furosemide (LASIX) 40 MG tablet Take 1 tablet (40 mg total) by mouth 2 (two) times a day. 60 tablet 0    hydrOXYchloroQUINE (PLAQUENIL) 200 mg tablet Take 1 tablet (200 mg total) by mouth once daily. 90 tablet 1    insulin NPH-insulin regular, 70/30, (NOVOLIN 70/30 U-100 INSULIN) 100 unit/mL (70-30) injection INJECT 40 UNITS UNDER SKIN IN THE MORNING AND 15 IN THE EVENING. 50 mL 6    insulin syringe-needle U-100 1 mL 31 gauge x 5/16 Syrg Inject 1 each into the skin 2 (two) times a day. Dx e11.9 200 each 3    insulin syringe-needle U-100 1/2 mL 31 gauge x 15/64" Syrg by Misc.(Non-Drug; Combo Route) route. Use as directed with insulin      lancets Misc 1 each by skin prick route 4 (four) times daily before meals and nightly. 200 each 5    latanoprost 0.005 % ophthalmic solution Place 1 drop into both eyes once daily.      loratadine (CLARITIN) 10 mg tablet Take 1 tablet (10 mg total) by mouth once daily. 90 tablet 1    losartan (COZAAR) 100 MG tablet Take 1 tablet (100 mg total) by mouth once daily. 30 tablet 5    meloxicam (MOBIC) 7.5 MG tablet Take 1 tablet (7.5 mg total) by mouth once daily. 30 tablet 1    methotrexate 2.5 MG Tab Take 2.5 mg by mouth every 7 days. 5 tablets q7 days      metoprolol tartrate (LOPRESSOR) 25 MG tablet TAKE ONE TABLET BY MOUTH THREE TIMES " DAILY 90 tablet 1    multivit-min-FA-lycopen-lutein (CENTRUM SILVER) 0.4 mg-300 mcg- 250 mcg Tab Take 1 tablet by mouth once daily.      multivitamin/iron/folic acid (CENTRUM ULTRA WOMEN'S ORAL) Take 1 tablet by mouth once daily.      naproxen (NAPROSYN) 500 MG tablet Take 1 tablet (500 mg total) by mouth 2 (two) times daily as needed. 20 tablet 1    omeprazole (PRILOSEC) 20 MG capsule Take 1 capsule (20 mg total) by mouth once daily. 90 capsule 1    pantoprazole (PROTONIX) 40 MG tablet Take 1 tablet (40 mg total) by mouth once daily. 60 tablet 6    potassium chloride SA (K-DUR,KLOR-CON M) 10 MEQ tablet Take 1 tablet (10 mEq total) by mouth 2 (two) times daily. 60 tablet 3    predniSONE (DELTASONE) 2.5 MG tablet Take 2.5 mg by mouth once daily at 6am.      pregabalin (LYRICA) 50 MG capsule Take 1 capsule by mouth 3 (three) times daily.      torsemide (DEMADEX) 20 MG Tab Take 2 tablets (40 mg total) by mouth every morning. 180 tablet 1    triamcinolone acetonide 0.1% (KENALOG) 0.1 % cream Apply topically 2 (two) times daily. Apply to affected area twice a day as needed 454 g 1    venlafaxine (EFFEXOR-XR) 75 MG 24 hr capsule Take 1 capsule (75 mg total) by mouth every other day. 45 capsule 1    zolpidem (AMBIEN) 5 MG Tab Take 1 tablet (5 mg total) by mouth nightly as needed. 1 tablet 0       Allergies  Review of patient's allergies indicates:  No Known Allergies    Physical Examination     Vitals:    11/03/22 1330   BP: (!) 178/81   Pulse: 75   Resp: 20   Temp: 97.4 °F (36.3 °C)     Physical Exam  Vitals and nursing note reviewed.   Constitutional:       Appearance: Normal appearance.   HENT:      Head: Normocephalic.   Cardiovascular:      Rate and Rhythm: Normal rate and regular rhythm.      Pulses: Normal pulses.      Heart sounds: Normal heart sounds.   Pulmonary:      Effort: Pulmonary effort is normal. No respiratory distress.   Chest:      Chest wall: No tenderness.   Musculoskeletal:          General: Swelling present.      Right lower leg: Edema present.      Left lower leg: Edema present.   Skin:     General: Skin is warm and dry.      Capillary Refill: Capillary refill takes 2 to 3 seconds.      Findings: Erythema present.      Comments: See LDA for photo and measurements   Neurological:      General: No focal deficit present.      Mental Status: She is alert and oriented to person, place, and time. Mental status is at baseline.   Psychiatric:         Mood and Affect: Mood normal.         Behavior: Behavior normal.         Thought Content: Thought content normal.         Judgment: Judgment normal.     Assessment and Plan             Altered Skin Integrity 11/03/22 Right posterior Calf #2 Venous Ulcer Full thickness tissue loss. Subcutaneous fat may be visible but bone, tendon or muscle are not exposed (Active)   11/03/22    Altered Skin Integrity Present on Admission: yes   Side: Right   Orientation: posterior   Location: Calf   Wound Number: #2   Is this injury device related?: No   Primary Wound Type: Venous ulcer   Description of Altered Skin Integrity: Full thickness tissue loss. Subcutaneous fat may be visible but bone, tendon or muscle are not exposed   Ankle-Brachial Index:    Pulses:    Removal Indication and Assessment:    Wound Outcome:    (Retired) Wound Length (cm):    (Retired) Wound Width (cm):    (Retired) Depth (cm):    Wound Description (Comments):    Removal Indications:    Wound Image    11/03/22 1459   Description of Altered Skin Integrity Full thickness tissue loss. Subcutaneous fat may be visible but bone, tendon or muscle are not exposed 11/03/22 1459   Dressing Appearance Moist drainage 11/03/22 1459   Drainage Amount Moderate 11/03/22 1459   Drainage Characteristics/Odor Serosanguineous 11/03/22 1459   Appearance Pink;Moist;Granulating 11/03/22 1459   Tissue loss description Full thickness 11/03/22 1459   Black (%), Wound Tissue Color 0 % 11/03/22 1459   Red (%), Wound Tissue  Color 100 % 11/03/22 1459   Yellow (%), Wound Tissue Color 0 % 11/03/22 1459   Periwound Area Dry 11/03/22 1459   Wound Edges Open 11/03/22 1459   Wound Length (cm) 2.5 cm 11/03/22 1459   Wound Width (cm) 1.4 cm 11/03/22 1459   Wound Depth (cm) 0.4 cm 11/03/22 1459   Wound Volume (cm^3) 1.4 cm^3 11/03/22 1459   Wound Surface Area (cm^2) 3.5 cm^2 11/03/22 1459   Care Cleansed with:;Antimicrobial agent 11/03/22 1459   Dressing Applied;Silicone;Hydrofiber;Gauze;Rolled gauze 11/03/22 1459   Periwound Care Moisture barrier applied 11/03/22 1459   Compression Tubular elasticized bandage 11/03/22 1459   Dressing Change Due 11/04/22 11/03/22 1459            Wound 09/27/21 Right lower;lateral Leg #3 (Active)   09/27/21     Pre-existing:    Primary Wound Type:    Side: Right   Orientation: lower;lateral   Location: Leg   Wound Number: #3   Ankle-Brachial Index:    Pulses:    Removal Indication and Assessment:    Wound Outcome:    (Retired) Wound Type:    (Retired) Wound Length (cm):    (Retired) Wound Width (cm):    (Retired) Depth (cm):    Wound Description (Comments):    Removal Indications:    Wound Image    11/03/22 1354   Dressing Appearance Dry 11/03/22 1354   Drainage Amount Moderate 11/03/22 1354   Drainage Characteristics/Odor Serosanguineous 11/03/22 1354   Appearance Pink;Moist;Granulating 11/03/22 1354   Tissue loss description Full thickness 11/03/22 1354   Black (%), Wound Tissue Color 0 % 11/03/22 1354   Red (%), Wound Tissue Color 100 % 11/03/22 1354   Yellow (%), Wound Tissue Color 0 % 11/03/22 1354   Periwound Area Moist 11/03/22 1354   Wound Edges Open 11/03/22 1354   Wound Length (cm) 6 cm 11/03/22 1354   Wound Width (cm) 1.5 cm 11/03/22 1354   Wound Depth (cm) 0.2 cm 11/03/22 1354   Wound Volume (cm^3) 1.8 cm^3 11/03/22 1354   Wound Surface Area (cm^2) 9 cm^2 11/03/22 1354   Care Cleansed with:;Antimicrobial agent 11/03/22 1354   Periwound Care Moisturizer applied 11/03/22 1354   Compression Two layer  compression 11/03/22 1354   Dressing Change Due 11/04/22 11/03/22 1354     Problem List Items Addressed This Visit          Cardiac/Vascular    PVD (peripheral vascular disease)    Overview     Last arterial doppler 9/2021  Ankle brachial indices are 1.24 on the right and 1.2 on the left.            Orthopedic    Venous ulcer of right leg - Primary    Overview                          Current Assessment & Plan     Clean with baby shampoo and water  Apply aloe vesta anti fungal ointment to leg then apply  mepitel over wound then apply vashe moisten drawtex (cut  larger than wound) cover with dry gauze,Wrap with kerlix and ace wrap or sure press from toes to just below knee  Change daily and as needed  Apply large edema wear to left leg  Elevate legs as much as possible  Avoid prolonged standing  Monitor closely for s/s of infection including fever, chills, increase in pain, odor from wound, and increased redness from foot. Go to ER if any complications develop.   Keep leg elevated and avoid pressure on wound.  Diabetes:  Monitor glucose closely. Check fasting glucose and 2 hours after meals. HgA1C goal <7, fasting glucose , and 2 hours after meals <180  Hypertension:  Check blood pressure twice daily, goal <120/80  Diet:   Increase protein intake, avoid fried, fatty foods and foods high in simple carbs.   Vitamins:  Take vitamin C 1000 mg, zinc 50mg, vitamin d 5000 units, and a daily multivitamin. Ernie is a good source of protein and nutrients to aid in wound healing.          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 Go to ER if any complications develop.   Keep leg elevated and avoid pressure on wound.  Diabetes:  Monitor glucose closely. Check fasting glucose and 2 hours after meals. HgA1C goal <7, fasting glucose , and 2 hours after meals <180  Hypertension:  Check blood pressure twice daily, goal <120/80  Diet:   Increase protein intake, avoid fried, fatty foods and foods high in simple carbs.   Vitamins:  Take vitamin C 1000 mg, zinc 50mg, vitamin d 5000 units, and a daily multivitamin. Ernie is a good source of protein and nutrients to aid in wound healing.             Future Appointments   Date Time Provider Department Center   11/8/2022  1:30 PM [facility] MAMMO1 [facility] MMIC [facility]   11/8/2022  1:45 PM CRISTINA Smith [facility] CARD [facility]   11/17/2022  1:00 PM [facility]   12/2/2022  3:00 PM AWV NURSE, [facility] FAMILY MEDICINE [facility] SHELDON Simental   1/2/2023  3:00 PM Fco Chaparro MD [facility] [place] S[place]            Signature:  MYA Escobar  [facility]  [address] [facility] [address]  [phone]    Date of encounter: 11/3/22

## 2022-11-02 NOTE — PATIENT INSTRUCTIONS
Clean with baby shampoo and water  Apply aloe vesta anti fungal ointment to leg then apply  mepitel over wound then apply vashe moisten drawtex (cut  larger than wound) cover with dry gauze,Wrap with kerlix and ace wrap or sure press from toes to just below knee  Change daily and as needed  Apply large edema wear to left leg  Elevate legs as much as possible  Avoid prolonged standing  Monitor closely for s/s of infection including fever, chills, increase in pain, odor from wound, and increased redness from foot. Go to ER if any complications develop.   Keep leg elevated and avoid pressure on wound.  Diabetes:  Monitor glucose closely. Check fasting glucose and 2 hours after meals. HgA1C goal <7, fasting glucose , and 2 hours after meals <180  Hypertension:  Check blood pressure twice daily, goal <120/80  Diet:   Increase protein intake, avoid fried, fatty foods and foods high in simple carbs.   Vitamins:  Take vitamin C 1000 mg, zinc 50mg, vitamin d 5000 units, and a daily multivitamin. Ernie is a good source of protein and nutrients to aid in wound healing.

## 2022-11-03 ENCOUNTER — OFFICE VISIT (OUTPATIENT)
Dept: WOUND CARE | Facility: CLINIC | Age: 77
End: 2022-11-03
Attending: FAMILY MEDICINE
Payer: COMMERCIAL

## 2022-11-03 VITALS
DIASTOLIC BLOOD PRESSURE: 81 MMHG | TEMPERATURE: 97 F | HEART RATE: 75 BPM | SYSTOLIC BLOOD PRESSURE: 178 MMHG | RESPIRATION RATE: 20 BRPM

## 2022-11-03 DIAGNOSIS — I73.9 PVD (PERIPHERAL VASCULAR DISEASE): ICD-10-CM

## 2022-11-03 DIAGNOSIS — I83.019 VENOUS ULCER OF RIGHT LEG: Primary | ICD-10-CM

## 2022-11-03 DIAGNOSIS — L97.919 VENOUS ULCER OF RIGHT LEG: Primary | ICD-10-CM

## 2022-11-03 DIAGNOSIS — L97.812 NON-PRESSURE CHRONIC ULCER OF OTHER PART OF RIGHT LOWER LEG WITH FAT LAYER EXPOSED: ICD-10-CM

## 2022-11-03 PROCEDURE — 1160F RVW MEDS BY RX/DR IN RCRD: CPT | Mod: CPTII,,, | Performed by: NURSE PRACTITIONER

## 2022-11-03 PROCEDURE — 1160F PR REVIEW ALL MEDS BY PRESCRIBER/CLIN PHARMACIST DOCUMENTED: ICD-10-PCS | Mod: CPTII,,, | Performed by: NURSE PRACTITIONER

## 2022-11-03 PROCEDURE — 99213 OFFICE O/P EST LOW 20 MIN: CPT | Mod: S$PBB,,, | Performed by: NURSE PRACTITIONER

## 2022-11-03 PROCEDURE — 3079F DIAST BP 80-89 MM HG: CPT | Mod: CPTII,,, | Performed by: NURSE PRACTITIONER

## 2022-11-03 PROCEDURE — 99215 OFFICE O/P EST HI 40 MIN: CPT | Mod: PBBFAC | Performed by: NURSE PRACTITIONER

## 2022-11-03 PROCEDURE — 1126F PR PAIN SEVERITY QUANTIFIED, NO PAIN PRESENT: ICD-10-PCS | Mod: CPTII,,, | Performed by: NURSE PRACTITIONER

## 2022-11-03 PROCEDURE — 99213 PR OFFICE/OUTPT VISIT, EST, LEVL III, 20-29 MIN: ICD-10-PCS | Mod: S$PBB,,, | Performed by: NURSE PRACTITIONER

## 2022-11-03 PROCEDURE — 1159F PR MEDICATION LIST DOCUMENTED IN MEDICAL RECORD: ICD-10-PCS | Mod: CPTII,,, | Performed by: NURSE PRACTITIONER

## 2022-11-03 PROCEDURE — 3051F PR MOST RECENT HEMOGLOBIN A1C LEVEL 7.0 - < 8.0%: ICD-10-PCS | Mod: CPTII,,, | Performed by: NURSE PRACTITIONER

## 2022-11-03 PROCEDURE — 1159F MED LIST DOCD IN RCRD: CPT | Mod: CPTII,,, | Performed by: NURSE PRACTITIONER

## 2022-11-03 PROCEDURE — 4010F PR ACE/ARB THEARPY RXD/TAKEN: ICD-10-PCS | Mod: CPTII,,, | Performed by: NURSE PRACTITIONER

## 2022-11-03 PROCEDURE — 4010F ACE/ARB THERAPY RXD/TAKEN: CPT | Mod: CPTII,,, | Performed by: NURSE PRACTITIONER

## 2022-11-03 PROCEDURE — 3077F SYST BP >= 140 MM HG: CPT | Mod: CPTII,,, | Performed by: NURSE PRACTITIONER

## 2022-11-03 PROCEDURE — 1126F AMNT PAIN NOTED NONE PRSNT: CPT | Mod: CPTII,,, | Performed by: NURSE PRACTITIONER

## 2022-11-03 PROCEDURE — 3051F HG A1C>EQUAL 7.0%<8.0%: CPT | Mod: CPTII,,, | Performed by: NURSE PRACTITIONER

## 2022-11-03 PROCEDURE — 3077F PR MOST RECENT SYSTOLIC BLOOD PRESSURE >= 140 MM HG: ICD-10-PCS | Mod: CPTII,,, | Performed by: NURSE PRACTITIONER

## 2022-11-03 PROCEDURE — 3079F PR MOST RECENT DIASTOLIC BLOOD PRESSURE 80-89 MM HG: ICD-10-PCS | Mod: CPTII,,, | Performed by: NURSE PRACTITIONER

## 2022-11-03 RX ORDER — SYRINGE,SAFETY WITH NEEDLE,1ML 25GX1"
1 SYRINGE (EA) MISCELLANEOUS 2 TIMES DAILY
Qty: 200 EACH | Refills: 3 | Status: SHIPPED | OUTPATIENT
Start: 2022-11-03 | End: 2023-11-02 | Stop reason: SDUPTHER

## 2022-11-03 RX ORDER — ATORVASTATIN CALCIUM 40 MG/1
40 TABLET, FILM COATED ORAL DAILY
Qty: 90 TABLET | Refills: 1 | Status: SHIPPED | OUTPATIENT
Start: 2022-11-03 | End: 2023-06-01

## 2022-11-03 RX ORDER — LORATADINE 10 MG/1
10 TABLET ORAL DAILY
Qty: 90 TABLET | Refills: 1 | Status: SHIPPED | OUTPATIENT
Start: 2022-11-03 | End: 2023-04-11 | Stop reason: SDUPTHER

## 2022-11-03 RX ORDER — VENLAFAXINE HYDROCHLORIDE 75 MG/1
75 CAPSULE, EXTENDED RELEASE ORAL EVERY OTHER DAY
Qty: 45 CAPSULE | Refills: 1 | Status: SHIPPED | OUTPATIENT
Start: 2022-11-03 | End: 2023-03-14 | Stop reason: SDUPTHER

## 2022-11-03 RX ORDER — POTASSIUM CHLORIDE 750 MG/1
10 TABLET, EXTENDED RELEASE ORAL 2 TIMES DAILY
Qty: 60 TABLET | Refills: 3 | Status: SHIPPED | OUTPATIENT
Start: 2022-11-03 | End: 2023-03-14 | Stop reason: SDUPTHER

## 2022-11-03 RX ORDER — LANCETS
1 EACH MISCELLANEOUS
Qty: 200 EACH | Refills: 5 | Status: SHIPPED | OUTPATIENT
Start: 2022-11-03

## 2022-11-03 RX ORDER — TORSEMIDE 20 MG/1
40 TABLET ORAL EVERY MORNING
Qty: 180 TABLET | Refills: 1 | Status: SHIPPED | OUTPATIENT
Start: 2022-11-03 | End: 2023-04-11 | Stop reason: SDUPTHER

## 2022-11-03 RX ORDER — MULTIVIT-MIN/FA/LYCOPEN/LUTEIN .4-300-25
1 TABLET ORAL DAILY
COMMUNITY

## 2022-11-03 RX ORDER — HYDROXYCHLOROQUINE SULFATE 200 MG/1
200 TABLET, FILM COATED ORAL DAILY
Qty: 90 TABLET | Refills: 1 | Status: SHIPPED | OUTPATIENT
Start: 2022-11-03 | End: 2023-10-25 | Stop reason: SDUPTHER

## 2022-11-03 RX ORDER — FERROUS SULFATE 325(65) MG
325 TABLET, DELAYED RELEASE (ENTERIC COATED) ORAL DAILY
Qty: 90 TABLET | Refills: 1 | Status: SHIPPED | OUTPATIENT
Start: 2022-11-03 | End: 2023-04-11 | Stop reason: SDUPTHER

## 2022-11-03 NOTE — PROGRESS NOTES
Subjective:       Patient ID: Delma Rae is a 77 y.o. female.    Chief Complaint: Hypertension    Patient is here today for a follow up evaluation. Patient blood pressure is stable today on intake, 120/63. Patient has a complaint of fatigue and tiredness. Patient states she has lack of energy. Will order Ferrous Sulfate 325mg PO QD #90 RF1. Will order Kcl 10 mEq PO BID #60 RF3. Recent labs reviewed, results show anemia. Patient is requesting medication refills. Will refill medications. Congestion heard of the left lung on ascultation. Patient states she does experience shortness of breath. Will order chest x-ray. Will follow with patient in 2 months.     Current Medications:    Current Outpatient Medications:     albuterol (PROVENTIL/VENTOLIN HFA) 90 mcg/actuation inhaler, Inhale 2 puffs into the lungs every 4 (four) hours as needed for Wheezing., Disp: , Rfl:     amitriptyline (ELAVIL) 10 MG tablet, Take 1 tablet by mouth every evening., Disp: , Rfl:     aspirin (ECOTRIN) 81 MG EC tablet, Take 81 mg by mouth once daily., Disp: , Rfl:     atorvastatin (LIPITOR) 40 MG tablet, Take 1 tablet (40 mg total) by mouth once daily., Disp: 90 tablet, Rfl: 1    blood sugar diagnostic (ONETOUCH ULTRA BLUE TEST STRIP) Strp, 1 strip by abdominal subcutaneous route 2 (two) times a day. Check glucose twice a day, Disp: 200 strip, Rfl: 5    brimonidine 0.2% (ALPHAGAN) 0.2 % Drop, Place 1 drop into both eyes 2 (two) times daily., Disp: , Rfl:     fluticasone furoate-vilanteroL (BREO ELLIPTA) 100-25 mcg/dose diskus inhaler, Inhale 1 puff into the lungs daily as needed., Disp: , Rfl:     FLUTICASONE PROPIONATE NASL, 1 spray by Nasal route once daily., Disp: , Rfl:     folic acid (FOLVITE) 1 MG tablet, Take 1 mg by mouth once daily., Disp: , Rfl:     furosemide (LASIX) 40 MG tablet, Take 1 tablet (40 mg total) by mouth 2 (two) times a day., Disp: 60 tablet, Rfl: 0    hydrALAZINE (APRESOLINE) 25 MG tablet, Take 1 tablet (25 mg  "total) by mouth 3 (three) times daily., Disp: 270 tablet, Rfl: 1    hydrOXYchloroQUINE (PLAQUENIL) 200 mg tablet, Take 200 mg by mouth once daily at 6am., Disp: , Rfl:     insulin NPH-insulin regular, 70/30, (NOVOLIN 70/30 U-100 INSULIN) 100 unit/mL (70-30) injection, INJECT 40 UNITS UNDER SKIN IN THE MORNING AND 15 IN THE EVENING., Disp: 50 mL, Rfl: 6    insulin syringe-needle U-100 1 mL 31 gauge x 5/16 Syrg, Check blood glucose 4 times a day as needed., Disp: 200 each, Rfl: 3    insulin syringe-needle U-100 1/2 mL 31 gauge x 15/64" Syrg, by Misc.(Non-Drug; Combo Route) route. Use as directed with insulin, Disp: , Rfl:     latanoprost 0.005 % ophthalmic solution, Place 1 drop into both eyes once daily., Disp: , Rfl:     loratadine (CLARITIN) 10 mg tablet, Take 1 tablet (10 mg total) by mouth once daily., Disp: 90 tablet, Rfl: 1    losartan (COZAAR) 100 MG tablet, Take 1 tablet (100 mg total) by mouth once daily., Disp: 30 tablet, Rfl: 5    meloxicam (MOBIC) 7.5 MG tablet, Take 1 tablet (7.5 mg total) by mouth once daily., Disp: 30 tablet, Rfl: 1    methotrexate 2.5 MG Tab, Take 2.5 mg by mouth every 7 days. 5 tablets q7 days, Disp: , Rfl:     metoprolol tartrate (LOPRESSOR) 25 MG tablet, TAKE ONE TABLET BY MOUTH THREE TIMES DAILY, Disp: 90 tablet, Rfl: 1    multivitamin/iron/folic acid (CENTRUM ULTRA WOMEN'S ORAL), Take 1 tablet by mouth once daily., Disp: , Rfl:     naproxen (NAPROSYN) 500 MG tablet, Take 1 tablet (500 mg total) by mouth 2 (two) times daily as needed., Disp: 20 tablet, Rfl: 1    omeprazole (PRILOSEC) 20 MG capsule, Take 1 capsule (20 mg total) by mouth once daily., Disp: 90 capsule, Rfl: 1    pantoprazole (PROTONIX) 40 MG tablet, Take 1 tablet (40 mg total) by mouth once daily., Disp: 60 tablet, Rfl: 6    potassium chloride SA (K-DUR,KLOR-CON M) 10 MEQ tablet, Take 1 tablet (10 mEq total) by mouth 2 (two) times daily., Disp: 60 tablet, Rfl: 0    predniSONE (DELTASONE) 2.5 MG tablet, Take 2.5 mg by " mouth once daily at 6am., Disp: , Rfl:     pregabalin (LYRICA) 50 MG capsule, Take 1 capsule by mouth 3 (three) times daily., Disp: , Rfl:     torsemide (DEMADEX) 20 MG Tab, Take 2 tablets (40 mg total) by mouth every morning., Disp: 60 tablet, Rfl: 1    triamcinolone acetonide 0.1% (KENALOG) 0.1 % cream, Apply topically 2 (two) times daily. Apply to affected area twice a day as needed, Disp: 454 g, Rfl: 1    venlafaxine (EFFEXOR-XR) 75 MG 24 hr capsule, TAKE ONE CAPSULE BY MOUTH EVERY OTHER DAY, Disp: 45 capsule, Rfl: 1    zolpidem (AMBIEN) 5 MG Tab, Take 1 tablet (5 mg total) by mouth nightly as needed., Disp: 1 tablet, Rfl: 0    Last Labs:     No visits with results within 1 Month(s) from this visit.   Latest known visit with results is:   Office Visit on 09/21/2022   Component Date Value    Sodium 09/21/2022 138     Potassium 09/21/2022 4.8     Chloride 09/21/2022 102     CO2 09/21/2022 33 (H)     Anion Gap 09/21/2022 8     Glucose 09/21/2022 91     BUN 09/21/2022 21 (H)     Creatinine 09/21/2022 1.76 (H)     BUN/Creatinine Ratio 09/21/2022 12     Calcium 09/21/2022 9.5     eGFR 09/21/2022 30 (L)     Hemoglobin A1C 09/21/2022 7.0 (H)     Estimated Average Glucose 09/21/2022 147     Total Protein 09/21/2022 8.5 (H)     Albumin 09/21/2022 3.3 (L)     Bilirubin, Total 09/21/2022 0.4     Bilirubin, Direct 09/21/2022 0.1     AST 09/21/2022 16     ALT 09/21/2022 21     Alk Phos 09/21/2022 105     WBC 09/21/2022 11.00     RBC 09/21/2022 4.42     Hemoglobin 09/21/2022 10.7 (L)     Hematocrit 09/21/2022 33.8 (L)     MCV 09/21/2022 76.5 (L)     MCH 09/21/2022 24.2 (L)     MCHC 09/21/2022 31.7 (L)     RDW 09/21/2022 14.5     Platelet Count 09/21/2022 381     MPV 09/21/2022 11.9     Neutrophils % 09/21/2022 88.6 (H)     Lymphocytes % 09/21/2022 5.9 (L)     Monocytes % 09/21/2022 3.8     Eosinophils % 09/21/2022 0.9 (L)     Basophils % 09/21/2022 0.3     Immature Granulocytes % 09/21/2022 0.5 (H)     nRBC, Auto 09/21/2022  0.0     Neutrophils, Abs 09/21/2022 9.74 (H)     Lymphocytes, Absolute 09/21/2022 0.65 (L)     Monocytes, Absolute 09/21/2022 0.42     Eosinophils, Absolute 09/21/2022 0.10     Basophils, Absolute 09/21/2022 0.03     Immature Granulocytes, A* 09/21/2022 0.06 (H)     nRBC, Absolute 09/21/2022 0.00     Diff Type 09/21/2022 Auto        Last Imaging:  X-Ray Chest PA And Lateral  Narrative: EXAMINATION:  XR CHEST PA AND LATERAL    CLINICAL HISTORY:  Heart failure, unspecified    TECHNIQUE:  XR CHEST PA AND LATERAL    COMPARISON:  2021    FINDINGS:  No lines or tubes.    Perihilar and lower lobe airspace opacities, edema versus infection.    Normal pleura.    Cardiac silhouette is similar to comparison exam.    No obvious acute bone findings.  Impression: Perihilar and lower lobe airspace opacities, edema versus infection.    Electronically signed by: Yuan Carlton  Date:    11/03/2022  Time:    07:57         Review of Systems   Constitutional:  Positive for fatigue.   All other systems reviewed and are negative.      Objective:      Physical Exam  Vitals reviewed.   Constitutional:       Appearance: Normal appearance. She is normal weight.   Cardiovascular:      Rate and Rhythm: Normal rate and regular rhythm.      Pulses: Normal pulses.      Heart sounds: Normal heart sounds.   Pulmonary:      Effort: Pulmonary effort is normal.      Breath sounds: Normal breath sounds.   Abdominal:      General: Abdomen is flat. Bowel sounds are normal.      Palpations: Abdomen is soft.   Musculoskeletal:         General: Normal range of motion.      Cervical back: Normal range of motion and neck supple.   Skin:     General: Skin is warm and dry.   Neurological:      General: No focal deficit present.      Mental Status: She is alert and oriented to person, place, and time. Mental status is at baseline.       Assessment:       1. Congestive heart failure, unspecified HF chronicity, unspecified heart failure type  X-Ray Chest PA And  "Lateral      2. Diabetes mellitus due to underlying condition with hyperosmolarity without coma, without long-term current use of insulin        3. Seasonal allergic rhinitis due to pollen        4. Gastroesophageal reflux disease, unspecified whether esophagitis present Stable     advise to sleep with 3 pillows. diet and exercise discussed.       5. Anemia, unspecified type        6. CKD (chronic kidney disease), stage IV             Plan:         Delma was seen today for hypertension.    Diagnoses and all orders for this visit:    Congestive heart failure, unspecified HF chronicity, unspecified heart failure type  -     X-Ray Chest PA And Lateral; Future    Diabetes mellitus due to underlying condition with hyperosmolarity without coma, without long-term current use of insulin  The following orders have not been finalized:  -     blood sugar diagnostic (ONETOUCH ULTRA BLUE TEST STRIP) Strp    Seasonal allergic rhinitis due to pollen  The following orders have not been finalized:  -     loratadine (CLARITIN) 10 mg tablet    Gastroesophageal reflux disease, unspecified whether esophagitis present  Comments:  advise to sleep with 3 pillows. diet and exercise discussed.     Anemia, unspecified type    CKD (chronic kidney disease), stage IV    Other orders  The following orders have not been finalized:  -     hydrOXYchloroQUINE (PLAQUENIL) 200 mg tablet  -     insulin NPH-insulin regular, 70/30, (NOVOLIN 70/30 U-100 INSULIN) 100 unit/mL (70-30) injection  -     insulin syringe-needle U-100 1 mL 31 gauge x 5/16 Syrg  -     Cancel: insulin syringe-needle U-100 1/2 mL 31 gauge x 15/64" Syrg  -     venlafaxine (EFFEXOR-XR) 75 MG 24 hr capsule  -     ferrous sulfate 325 (65 FE) MG EC tablet  -     potassium chloride SA (K-DUR,KLOR-CON M) 10 MEQ tablet  -     lancets Misc  -     torsemide (DEMADEX) 20 MG Tab  -     atorvastatin (LIPITOR) 40 MG tablet                 "

## 2022-11-03 NOTE — PATIENT INSTRUCTIONS
"Delma was seen today for hypertension.    Diagnoses and all orders for this visit:    Congestive heart failure, unspecified HF chronicity, unspecified heart failure type  -     X-Ray Chest PA And Lateral; Future    Diabetes mellitus due to underlying condition with hyperosmolarity without coma, without long-term current use of insulin  The following orders have not been finalized:  -     blood sugar diagnostic (ONETOUCH ULTRA BLUE TEST STRIP) Strp    Seasonal allergic rhinitis due to pollen  The following orders have not been finalized:  -     loratadine (CLARITIN) 10 mg tablet    Gastroesophageal reflux disease, unspecified whether esophagitis present  Comments:  advise to sleep with 3 pillows. diet and exercise discussed.     Anemia, unspecified type    CKD (chronic kidney disease), stage IV    Other orders  The following orders have not been finalized:  -     hydrOXYchloroQUINE (PLAQUENIL) 200 mg tablet  -     insulin NPH-insulin regular, 70/30, (NOVOLIN 70/30 U-100 INSULIN) 100 unit/mL (70-30) injection  -     insulin syringe-needle U-100 1 mL 31 gauge x 5/16 Syrg  -     Cancel: insulin syringe-needle U-100 1/2 mL 31 gauge x 15/64" Syrg  -     venlafaxine (EFFEXOR-XR) 75 MG 24 hr capsule  -     ferrous sulfate 325 (65 FE) MG EC tablet  -     potassium chloride SA (K-DUR,KLOR-CON M) 10 MEQ tablet  -     lancets Misc  -     torsemide (DEMADEX) 20 MG Tab  -     atorvastatin (LIPITOR) 40 MG tablet     "

## 2022-11-04 PROBLEM — L97.812 NON-PRESSURE CHRONIC ULCER OF OTHER PART OF RIGHT LOWER LEG WITH FAT LAYER EXPOSED: Status: ACTIVE | Noted: 2022-11-04

## 2022-11-09 DIAGNOSIS — Z71.89 COMPLEX CARE COORDINATION: ICD-10-CM

## 2022-11-16 NOTE — TELEPHONE ENCOUNTER
----- Message from Li Soriano sent at 11/16/2022  2:32 PM CST -----  Patient's son is asking for the following medication to be called in for his mom    metoprolol tartrate (LOPRESSOR) 25 MG tablet    Will's phone number - [phone]

## 2022-11-17 RX ORDER — METOPROLOL TARTRATE 25 MG/1
25 TABLET, FILM COATED ORAL 3 TIMES DAILY
Qty: 90 TABLET | Refills: 1 | Status: SHIPPED | OUTPATIENT
Start: 2022-11-17 | End: 2023-01-17 | Stop reason: SDUPTHER

## 2022-12-30 ENCOUNTER — EXTERNAL HOME HEALTH (OUTPATIENT)
Dept: HOME HEALTH SERVICES | Facility: HOSPITAL | Age: 77
End: 2022-12-30

## 2023-01-01 ENCOUNTER — EXTERNAL CHRONIC CARE MANAGEMENT (OUTPATIENT)
Dept: FAMILY MEDICINE | Facility: CLINIC | Age: 78
End: 2023-01-01
Payer: COMMERCIAL

## 2023-01-01 DIAGNOSIS — J30.1 SEASONAL ALLERGIC RHINITIS DUE TO POLLEN: ICD-10-CM

## 2023-01-01 PROCEDURE — G0511 CCM/BHI BY RHC/FQHC 20MIN MO: HCPCS | Mod: ,,, | Performed by: INTERNAL MEDICINE

## 2023-01-01 RX ORDER — FERROUS SULFATE 325(65) MG
325 TABLET, DELAYED RELEASE (ENTERIC COATED) ORAL DAILY
Qty: 90 TABLET | Refills: 1 | Status: CANCELLED | OUTPATIENT
Start: 2023-01-01

## 2023-01-01 RX ORDER — LORATADINE 10 MG/1
10 TABLET ORAL DAILY
Qty: 90 TABLET | Refills: 1 | Status: CANCELLED | OUTPATIENT
Start: 2023-01-01

## 2023-01-06 NOTE — PROGRESS NOTES
MYA Escobar   [facility]  [address] [facility] [place] [address]  [phone]      PATIENT NAME: Delma Rae  : 1945  DATE: 23  MRN: [id number]      Billing Provider: MYA Escobar  Level of Service:   Patient PCP Information       Provider PCP Type    Fco Chaparro MD General            Reason for Visit / Chief Complaint: Venous ulcer of right leg and Venous Ulcer (RLE, multiple ulcers)       History of Present Illness / Problem Focused Workflow     Delma Rae is a 76 y.o. female who presents to clinic for follow up on chronic-non healing wound on the right lower leg. Wound is worse since last visit. Patient missed last follow up appointment. Right leg is bleeding today when dressing removed. Skin is thin and fragile with multiple wounds. Wounds are suspicious for vasculitis. Discussed benefits/risks of prednisone taper with patient and family.  Arterial doppler 2022, No areas of occlusion. No doubling of peak systolic velocity between adjacent stations to specifically suggest high-grade stenosis of a focal nature. Ankle brachial indices measure normal at 1.27 right and 1.19 left.   Significant PMH of CHF, COPD, hypertension, MI, rheumatoid arthritis, hyperlipidemia, GERD, and diabetes. Last HgA1C 7 in , diabetes managed by PCP.  Pertinent factors that delay wound healing include multiple co-morbidities, poor vascular supply, diabetes, edema, heavy drainage, excessive wound moisture and macerated tissue, large surface area, advanced age and fragile skin. Denies fever and chills.      Review of Systems     Review of Systems   Constitutional:  Positive for activity change. Negative for chills and fever.   Respiratory:  Negative for chest tightness and shortness of breath.    Cardiovascular:  Positive for leg swelling. Negative for chest pain and palpitations.   Musculoskeletal:  Positive for arthralgias  and joint swelling.   Skin:  Positive for wound.        See wound assessment   Neurological:  Positive for weakness and numbness.   Psychiatric/Behavioral:  Negative for agitation, behavioral problems, confusion and self-injury.      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 Chaidez    GERD (gastroesophageal reflux disease)     HHD (hypertensive heart disease)     Hyperlipidemia     Hypertension     MI (myocardial infarction)     OA (osteoarthritis)     Rheumatoid arthritis        Past Surgical History:   Procedure Laterality Date    HYSTERECTOMY      OOPHORECTOMY      VAGINAL DELIVERY      x 1       Social History  Ms. Delma Rae  reports that she has never smoked. She has never used smokeless tobacco. She reports that she does not drink alcohol and does not use drugs.    Family History  Ms.'s Delma Rae family history includes Cancer in her brother and father; Coronary artery disease in her brother and sister; Diabetes in her father and mother; Hypertension in her mother and son; Prostate cancer in her father.    Medications and Allergies     Medications  Outpatient Medications Marked as Taking for the 1/9/23 encounter (Office Visit) with MYA Escobar   Medication Sig Dispense Refill    amitriptyline (ELAVIL) 10 MG tablet Take 1 tablet by mouth every evening.      aspirin (ECOTRIN) 81 MG EC tablet Take 81 mg by mouth once daily.      atorvastatin (LIPITOR) 40 MG tablet Take 1 tablet (40 mg total) by mouth once daily. 90 tablet 1    blood sugar diagnostic (ONETOUCH ULTRA BLUE TEST STRIP) Strp 1 strip by Other route 4 (four) times daily before meals  "and nightly. Dx e11.9 200 strip 5    brimonidine 0.2% (ALPHAGAN) 0.2 % Drop Place 1 drop into both eyes 2 (two) times daily.      ferrous sulfate 325 (65 FE) MG EC tablet Take 1 tablet (325 mg total) by mouth once daily. 90 tablet 1    fluticasone furoate-vilanteroL (BREO ELLIPTA) 100-25 mcg/dose diskus inhaler Inhale 1 puff into the lungs daily as needed.      FLUTICASONE PROPIONATE NASL 1 spray by Nasal route once daily.      FLUTICASONE PROPIONATE NASL 1 spray by Each Nostril route daily as needed.      folic acid (FOLVITE) 1 MG tablet Take 1 mg by mouth once daily.      furosemide (LASIX) 40 MG tablet Take 1 tablet (40 mg total) by mouth 2 (two) times a day. 60 tablet 0    hydrALAZINE (APRESOLINE) 25 MG tablet TAKE ONE TABLET BY MOUTH THREE TIMES DAILY 270 tablet 1    hydrOXYchloroQUINE (PLAQUENIL) 200 mg tablet Take 1 tablet (200 mg total) by mouth once daily. 90 tablet 1    insulin NPH-insulin regular, 70/30, (NOVOLIN 70/30 U-100 INSULIN) 100 unit/mL (70-30) injection INJECT 40 UNITS UNDER SKIN IN THE MORNING AND 15 IN THE EVENING. 50 mL 6    insulin syringe-needle U-100 1 mL 31 gauge x 5/16 Syrg Inject 1 each into the skin 2 (two) times a day. Dx e11.9 200 each 3    insulin syringe-needle U-100 1/2 mL 31 gauge x 15/64" Syrg by Misc.(Non-Drug; Combo Route) route. Use as directed with insulin      lancets Misc 1 each by skin prick route 4 (four) times daily before meals and nightly. 200 each 5    latanoprost 0.005 % ophthalmic solution Place 1 drop into both eyes once daily.      loratadine (CLARITIN) 10 mg tablet Take 1 tablet (10 mg total) by mouth once daily. 90 tablet 1    losartan (COZAAR) 100 MG tablet Take 1 tablet (100 mg total) by mouth once daily. 30 tablet 5    meloxicam (MOBIC) 7.5 MG tablet Take 1 tablet (7.5 mg total) by mouth once daily. 30 tablet 1    methotrexate 2.5 MG Tab Take 2.5 mg by mouth every 7 days. 5 tablets q7 days      metoprolol tartrate (LOPRESSOR) 25 MG tablet " Take 1 tablet (25 mg total) by mouth 3 (three) times daily. 90 tablet 1    multivit-min-FA-lycopen-lutein (CENTRUM SILVER) 0.4 mg-300 mcg- 250 mcg Tab Take 1 tablet by mouth once daily.      multivitamin/iron/folic acid (CENTRUM ULTRA WOMEN'S ORAL) Take 1 tablet by mouth once daily.      naproxen (NAPROSYN) 500 MG tablet Take 1 tablet (500 mg total) by mouth 2 (two) times daily as needed. 20 tablet 1    omeprazole (PRILOSEC) 20 MG capsule Take 1 capsule (20 mg total) by mouth once daily. 90 capsule 1    potassium chloride SA (K-DUR,KLOR-CON M) 10 MEQ tablet Take 1 tablet (10 mEq total) by mouth 2 (two) times daily. 60 tablet 3    predniSONE (DELTASONE) 2.5 MG tablet Take 2.5 mg by mouth once daily at 6am.      pregabalin (LYRICA) 50 MG capsule Take 1 capsule by mouth 3 (three) times daily.      torsemide (DEMADEX) 20 MG Tab Take 2 tablets (40 mg total) by mouth every morning. 180 tablet 1    triamcinolone acetonide 0.1% (KENALOG) 0.1 % cream Apply topically 2 (two) times daily. Apply to affected area twice a day as needed 454 g 1    venlafaxine (EFFEXOR-XR) 75 MG 24 hr capsule Take 1 capsule (75 mg total) by mouth every other day. 45 capsule 1    zolpidem (AMBIEN) 5 MG Tab Take 1 tablet (5 mg total) by mouth nightly as needed. 1 tablet 0    [DISCONTINUED] albuterol (PROVENTIL/VENTOLIN HFA) 90 mcg/actuation inhaler Inhale 2 puffs into the lungs every 4 (four) hours as needed for Wheezing.      [DISCONTINUED] pantoprazole (PROTONIX) 40 MG tablet Take 1 tablet (40 mg total) by mouth once daily. 60 tablet 6       Allergies  Review of patient's allergies indicates:  No Known Allergies    Physical Examination     Vitals:    01/09/23 1315   BP: (!) 145/76   Pulse: 71   Resp: 18   Temp: 97.7 °F (36.5 °C)     Physical Exam  Vitals and nursing note reviewed.   Constitutional:       Appearance: Normal appearance.   HENT:      Head: Normocephalic.   Cardiovascular:      Rate and Rhythm: Normal rate and regular  rhythm.      Pulses: Normal pulses.      Heart sounds: Normal heart sounds.   Pulmonary:      Effort: Pulmonary effort is normal. No respiratory distress.   Chest:      Chest wall: No tenderness.   Musculoskeletal:         General: Swelling present.      Right lower leg: Edema present.      Left lower leg: Edema present.   Skin:     General: Skin is warm and dry.      Capillary Refill: Capillary refill takes 2 to 3 seconds.      Findings: Erythema present.      Comments: See LDA for photo and measurements   Neurological:      General: No focal deficit present.      Mental Status: She is alert and oriented to person, place, and time. Mental status is at baseline.   Psychiatric:         Mood and Affect: Mood normal.         Behavior: Behavior normal.         Thought Content: Thought content normal.         Judgment: Judgment normal.     Assessment and Plan             Altered Skin Integrity 11/03/22 Right posterior Calf #2 Venous Ulcer Full thickness tissue loss. Subcutaneous fat may be visible but bone, tendon or muscle are not exposed (Active)   11/03/22    Altered Skin Integrity Present on Admission: yes   Side: Right   Orientation: posterior   Location: Calf   Wound Number: #2   Is this injury device related?: No   Primary Wound Type: Venous ulcer   Description of Altered Skin Integrity: Full thickness tissue loss. Subcutaneous fat may be visible but bone, tendon or muscle are not exposed   Ankle-Brachial Index:    Pulses:    Removal Indication and Assessment:    Wound Outcome:    (Retired) Wound Length (cm):    (Retired) Wound Width (cm):    (Retired) Depth (cm):    Wound Description (Comments):    Removal Indications:    Wound Image   01/09/23 1400   Description of Altered Skin Integrity Full thickness tissue loss. Subcutaneous fat may be visible but bone, tendon or muscle are not exposed 01/09/23 1329   Dressing Appearance Saturated 01/09/23 1329   Drainage Amount None 01/09/23 1400   Drainage  Characteristics/Odor Bleeding controlled 01/09/23 1400   Appearance Pink;Red;Moist;Granulating 01/09/23 1329   Tissue loss description Full thickness 01/09/23 1329   Black (%), Wound Tissue Color 0 % 01/09/23 1329   Red (%), Wound Tissue Color 100 % 01/09/23 1329   Yellow (%), Wound Tissue Color 0 % 01/09/23 1329   Periwound Area Dry;Bootjack 01/09/23 1329   Wound Edges Open 01/09/23 1329   Wound Length (cm) 3 cm 01/09/23 1329   Wound Width (cm) 5 cm 01/09/23 1329   Wound Depth (cm) 0.3 cm 01/09/23 1329   Wound Volume (cm^3) 4.5 cm^3 01/09/23 1329   Wound Surface Area (cm^2) 15 cm^2 01/09/23 1329   Care Cleansed with:;Soap and water;Applied:;Skin Barrier 01/09/23 1329   Dressing Applied;Gauze, wet to dry;Rolled gauze;Compression wrap 01/09/23 1329   Periwound Care Moisture barrier applied 01/09/23 1329   Compression Tubular elasticized bandage 01/09/23 1329   Dressing Change Due 01/10/23 01/09/23 1329            Altered Skin Integrity 01/09/23 1328 Right anterior;lower;proximal Leg (Active)   01/09/23 1328   Altered Skin Integrity Present on Admission:    Side: Right   Orientation: anterior;lower;proximal   Location: Leg   Wound Number:    Is this injury device related?:    Primary Wound Type:    Description of Altered Skin Integrity:    Ankle-Brachial Index:    Pulses:    Removal Indication and Assessment:    Wound Outcome:    (Retired) Wound Length (cm):    (Retired) Wound Width (cm):    (Retired) Depth (cm):    Wound Description (Comments):    Removal Indications:    Wound Image    01/09/23 1328   Description of Altered Skin Integrity Full thickness tissue loss. Subcutaneous fat may be visible but bone, tendon or muscle are not exposed 01/09/23 1328   Dressing Appearance Dried drainage 01/09/23 1328   Drainage Amount Moderate 01/09/23 1328   Drainage Characteristics/Odor Sanguineous;No odor;Bleeding controlled 01/09/23 1328   Appearance Pink;Red 01/09/23 1328   Tissue loss description Full thickness 01/09/23 1329    Black (%), Wound Tissue Color 0 % 01/09/23 1328   Red (%), Wound Tissue Color 100 % 01/09/23 1328   Yellow (%), Wound Tissue Color 0 % 01/09/23 1328   Periwound Area Macerated 01/09/23 1328   Wound Edges Jagged;Open 01/09/23 1328   Wound Length (cm) 3.5 cm 01/09/23 1328   Wound Width (cm) 1.4 cm 01/09/23 1328   Wound Depth (cm) 0.2 cm 01/09/23 1328   Wound Volume (cm^3) 0.98 cm^3 01/09/23 1328   Wound Surface Area (cm^2) 4.9 cm^2 01/09/23 1328   Care Cleansed with:;Soap and water;Applied:;Skin Barrier 01/09/23 1328   Dressing Applied;Gauze, wet to dry;Gauze;Absorptive Pad 01/09/23 1328   Periwound Care Moisture barrier applied 01/09/23 1328   Compression Tubular elasticized bandage 01/09/23 1328   Dressing Change Due 01/10/23 01/09/23 1328            Wound 09/27/21 Right lower;lateral Leg #3 (Active)   09/27/21     Pre-existing:    Primary Wound Type:    Side: Right   Orientation: lower;lateral   Location: Leg   Wound Number: #3   Ankle-Brachial Index:    Pulses:    Removal Indication and Assessment:    Wound Outcome:    (Retired) Wound Type:    (Retired) Wound Length (cm):    (Retired) Wound Width (cm):    (Retired) Depth (cm):    Wound Description (Comments):    Removal Indications:    Wound Image   01/09/23 1352   Dressing Appearance Moist drainage 01/09/23 1327   Drainage Amount None 01/09/23 1352   Drainage Characteristics/Odor Bleeding controlled 01/09/23 1352   Appearance Pink;Red 01/09/23 1327   Tissue loss description Full thickness 01/09/23 1327   Black (%), Wound Tissue Color 0 % 01/09/23 1327   Red (%), Wound Tissue Color 100 % 01/09/23 1327   Yellow (%), Wound Tissue Color 0 % 01/09/23 1327   Periwound Area Macerated;Moist 01/09/23 1327   Wound Edges Open 01/09/23 1327   Wound Length (cm) 4.5 cm 01/09/23 1327   Wound Width (cm) 8 cm 01/09/23 1327   Wound Depth (cm) 0.2 cm 01/09/23 1327   Wound Volume (cm^3) 7.2 cm^3 01/09/23 1327   Wound Surface Area (cm^2) 36 cm^2 01/09/23 1327   Care Cleansed  with:;Soap and water;Applied:;Skin Barrier 01/09/23 1327   Dressing Applied;Gauze, wet to dry;Gauze;Absorptive Pad 01/09/23 1327   Periwound Care Moisture barrier applied 01/09/23 1327   Compression Tubular elasticized bandage 01/09/23 1327   Dressing Change Due 01/10/23 01/09/23 1327     Problem List Items Addressed This Visit          Cardiac/Vascular    PVD (peripheral vascular disease)    Overview     Last arterial doppler 9/2021  Ankle brachial indices are 1.24 on the right and 1.2 on the left.            Orthopedic    Venous ulcer of right leg - Primary    Overview                                      Current Assessment & Plan     Clean with baby shampoo and water  Apply aloe vesta anti fungal ointment to leg then apply mepitel over wound then apply Aquacel Ag (cut larger than wound) cover with dry gauze,Wrap with kerlix and ace wrap or sure press from toes to just below knee  Change daily and as needed  Apply large edema wear to left leg  Elevate legs as much as possible  Avoid prolonged standing  Monitor closely for s/s of infection including fever, chills, increase in pain, odor from wound, and increased redness from foot. Go to ER if any complications develop.   Keep leg elevated and avoid pressure on wound.  Diabetes:  Monitor glucose closely. Check fasting glucose and 2 hours after meals. HgA1C goal <7, fasting glucose , and 2 hours after meals <180  Hypertension:  Check blood pressure twice daily, goal <120/80  Diet:   Increase protein intake, avoid fried, fatty foods and foods high in simple carbs.   Vitamins:  Take vitamin C 1000 mg, zinc 50mg, vitamin d 5000 units, and a daily multivitamin. Ernie is a good source of protein and nutrients to aid in wound healing.                  Non-pressure chronic ulcer of other part of right lower leg with fat layer exposed    Overview                         Future Appointments   Date Time Provider Department Center   1/17/2023  1:15 PM FAMILIA KING  [facility]   1/17/2023  2:00 PM CRISTINA Smith OB [facility]   1/26/2023  1:00 PM [facility]   1/31/2023  3:00 PM Fco Chaparro MD [facility] [place]   1/12/2024  3:00 PM AWV NURSE, [facility] FAMILY MEDICINE [facility] [place]            Signature:  MYA Escobar  [facility]  [address]  [place] [address]  [phone]    Date of encounter: 1/9/23

## 2023-01-06 NOTE — PATIENT INSTRUCTIONS
Clean with baby shampoo and water  Apply aloe vesta anti fungal ointment to leg then apply mepitel over wound then apply Aquacel Ag (cut larger than wound) cover with dry gauze,Wrap with kerlix and ace wrap or sure press from toes to just below knee  Change daily and as needed  Apply large edema wear to left leg  Elevate legs as much as possible  Avoid prolonged standing  Monitor closely for s/s of infection including fever, chills, increase in pain, odor from wound, and increased redness from foot. Go to ER if any complications develop.   Keep leg elevated and avoid pressure on wound.  Diabetes:  Monitor glucose closely. Check fasting glucose and 2 hours after meals. HgA1C goal <7, fasting glucose , and 2 hours after meals <180  Hypertension:  Check blood pressure twice daily, goal <120/80  Diet:   Increase protein intake, avoid fried, fatty foods and foods high in simple carbs.   Vitamins:  Take vitamin C 1000 mg, zinc 50mg, vitamin d 5000 units, and a daily multivitamin. Ernie

## 2023-01-06 NOTE — PROGRESS NOTES
[facility]       PATIENT NAME: Delma Rae   : 1945    AGE: 77 y.o. DATE: 2023   MRN: [id number]        Reason for Visit / Chief Complaint: Medicare AWV (Initial [employer] AWV visit )        Delma Rae presents today with her son for an Initial [employer] Annual Wellness Visit today. Ms. Rae gets her primary care needs met by Dr. Fco Chaparro. Her medical history of includes obesity with BMI of 33.83, Osteoarthritis Bilateral Knees, Anxiety, COPD, Cardiomyopathy, DM Type 2, Hypertension, Hyperlipidemia, Hx of MI, GERD, Anemia, Allergic Rhinitis, Insomnia, and PVD. She is due for lipid panel today and agrees to have Hep C Screening also.     Review of Systems   Constitutional:  Negative for chills and fever.   HENT:  Positive for congestion and hearing loss (left ear). Negative for sore throat.    Respiratory:  Negative for cough and shortness of breath.    Cardiovascular:  Positive for leg swelling. Negative for chest pain.   Gastrointestinal:  Negative for abdominal pain, constipation and diarrhea.   Genitourinary:  Negative for dysuria, frequency and urgency.   Musculoskeletal:  Positive for joint pain.   Neurological:  Negative for dizziness and headaches.   Psychiatric/Behavioral:  Negative for depression. The patient has insomnia. The patient is not nervous/anxious.       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 Chaidez    GERD (gastroesophageal reflux disease)     HHD (hypertensive heart disease)     Hyperlipidemia     Hypertension     MI (myocardial infarction)     OA  (osteoarthritis)     Rheumatoid arthritis         Family History   Problem Relation Age of Onset    Diabetes Mother         age 60    Hypertension Mother     Diabetes Father     Prostate cancer Father     Cancer Father         Prostate, age 85    Coronary artery disease Sister         age 72    Cancer Brother     Coronary artery disease Brother     Hypertension Son         age 50        Past Surgical History:   Procedure Laterality Date    HYSTERECTOMY      OOPHORECTOMY      VAGINAL DELIVERY      x 1       Social History     Tobacco Use   Smoking Status Never   Smokeless Tobacco Never       Social History     Substance and Sexual Activity   Alcohol Use Never         Allergies and Current Medications   Review of patient's allergies indicates:  No Known Allergies    Current Outpatient Medications:     albuterol (PROVENTIL/VENTOLIN HFA) 90 mcg/actuation inhaler, Inhale 2 puffs into the lungs every 4 (four) hours as needed for Wheezing., Disp: , Rfl:     aspirin (ECOTRIN) 81 MG EC tablet, Take 81 mg by mouth once daily., Disp: , Rfl:     atorvastatin (LIPITOR) 40 MG tablet, Take 1 tablet (40 mg total) by mouth once daily., Disp: 90 tablet, Rfl: 1    blood sugar diagnostic (ONETOUCH ULTRA BLUE TEST STRIP) Strp, 1 strip by Other route 4 (four) times daily before meals and nightly. Dx e11.9, Disp: 200 strip, Rfl: 5    brimonidine 0.2% (ALPHAGAN) 0.2 % Drop, Place 1 drop into both eyes 2 (two) times daily., Disp: , Rfl:     ferrous sulfate 325 (65 FE) MG EC tablet, Take 1 tablet (325 mg total) by mouth once daily., Disp: 90 tablet, Rfl: 1    fluticasone furoate-vilanteroL (BREO ELLIPTA) 100-25 mcg/dose diskus inhaler, Inhale 1 puff into the lungs daily as needed., Disp: , Rfl:     FLUTICASONE PROPIONATE NASL, 1 spray by Each Nostril route daily as needed., Disp: , Rfl:     folic acid (FOLVITE) 1 MG tablet, Take 1 mg by mouth once daily., Disp: , Rfl:     furosemide (LASIX) 40 MG tablet, Take 1 tablet (40 mg total) by mouth  "2 (two) times a day., Disp: 60 tablet, Rfl: 0    hydrALAZINE (APRESOLINE) 25 MG tablet, TAKE ONE TABLET BY MOUTH THREE TIMES DAILY, Disp: 270 tablet, Rfl: 1    hydrOXYchloroQUINE (PLAQUENIL) 200 mg tablet, Take 1 tablet (200 mg total) by mouth once daily., Disp: 90 tablet, Rfl: 1    insulin NPH-insulin regular, 70/30, (NOVOLIN 70/30 U-100 INSULIN) 100 unit/mL (70-30) injection, INJECT 40 UNITS UNDER SKIN IN THE MORNING AND 15 IN THE EVENING., Disp: 50 mL, Rfl: 6    insulin syringe-needle U-100 1 mL 31 gauge x 5/16 Syrg, Inject 1 each into the skin 2 (two) times a day. Dx e11.9, Disp: 200 each, Rfl: 3    insulin syringe-needle U-100 1/2 mL 31 gauge x 15/64" Syrg, by Misc.(Non-Drug; Combo Route) route. Use as directed with insulin, Disp: , Rfl:     lancets Misc, 1 each by skin prick route 4 (four) times daily before meals and nightly., Disp: 200 each, Rfl: 5    latanoprost 0.005 % ophthalmic solution, Place 1 drop into both eyes once daily., Disp: , Rfl:     loratadine (CLARITIN) 10 mg tablet, Take 1 tablet (10 mg total) by mouth once daily., Disp: 90 tablet, Rfl: 1    losartan (COZAAR) 100 MG tablet, Take 1 tablet (100 mg total) by mouth once daily., Disp: 30 tablet, Rfl: 5    methotrexate 2.5 MG Tab, Take 2.5 mg by mouth every 7 days. 5 tablets q7 days, Disp: , Rfl:     metoprolol tartrate (LOPRESSOR) 25 MG tablet, Take 1 tablet (25 mg total) by mouth 3 (three) times daily., Disp: 90 tablet, Rfl: 1    multivit-min-FA-lycopen-lutein (CENTRUM SILVER) 0.4 mg-300 mcg- 250 mcg Tab, Take 1 tablet by mouth once daily., Disp: , Rfl:     multivitamin/iron/folic acid (CENTRUM ULTRA WOMEN'S ORAL), Take 1 tablet by mouth once daily., Disp: , Rfl:     naproxen (NAPROSYN) 500 MG tablet, Take 1 tablet (500 mg total) by mouth 2 (two) times daily as needed., Disp: 20 tablet, Rfl: 1    omeprazole (PRILOSEC) 20 MG capsule, Take 1 capsule (20 mg total) by mouth once daily., Disp: 90 capsule, Rfl: 1    potassium chloride SA " (K-DUR,KLOR-CON M) 10 MEQ tablet, Take 1 tablet (10 mEq total) by mouth 2 (two) times daily., Disp: 60 tablet, Rfl: 3    predniSONE (DELTASONE) 2.5 MG tablet, Take 2.5 mg by mouth once daily at 6am., Disp: , Rfl:     torsemide (DEMADEX) 20 MG Tab, Take 2 tablets (40 mg total) by mouth every morning., Disp: 180 tablet, Rfl: 1    triamcinolone acetonide 0.1% (KENALOG) 0.1 % cream, Apply topically 2 (two) times daily. Apply to affected area twice a day as needed, Disp: 454 g, Rfl: 1    venlafaxine (EFFEXOR-XR) 75 MG 24 hr capsule, Take 1 capsule (75 mg total) by mouth every other day., Disp: 45 capsule, Rfl: 1    zolpidem (AMBIEN) 5 MG Tab, Take 1 tablet (5 mg total) by mouth nightly as needed., Disp: 1 tablet, Rfl: 0    amitriptyline (ELAVIL) 10 MG tablet, Take 1 tablet by mouth every evening., Disp: , Rfl:     FLUTICASONE PROPIONATE NASL, 1 spray by Nasal route once daily., Disp: , Rfl:     meloxicam (MOBIC) 7.5 MG tablet, Take 1 tablet (7.5 mg total) by mouth once daily., Disp: 30 tablet, Rfl: 1    pregabalin (LYRICA) 50 MG capsule, Take 1 capsule by mouth 3 (three) times daily., Disp: , Rfl:       Health Risk Assessment   Fall Risk: at risk uses walker and wheelchair with safety education given   Obesity: BMI Body mass index is 33.83 kg/m².   Advance Directive: no but information given   Depression: PHQ9- 0   HTN: DASH diet, exercise, weight management, med compliance, home BP monitoring, and follow-up discussed.   T2DM:  diabetic diet, glucose monitoring, activity level, weight management, med compliance, and follow-up discussed.  STI: not at risk   Statin Use: yes      Health Maintenance   Last eye exam: states saw Dr. Hernandez 2021-[facility] faxed   Last CV screen with lipids: drawn this visit   Diabetes screening with fasting glucose or A1c: 9/21/22   Colonoscopy: declined- last done 7/8/13 Dr. White - repeat in 5 years   Flu Vaccine: 9/21/22   Pneumonia vaccines: Pneu 13-7/19/16, Pneu 23-1/15/18   COVID vaccine:  (pfizer) 3/11/21, 4/1/21   Hep B vaccine: na   DEXA: declined   Last pap/pelvic: declined (history of hysterectomy)  Last Mammogram: ordered 11/2/21   AAA screening: na   HIV Screening: not at risk  Hepatitis C Screen: drawn this visit  Low Dose CT Scan: na    Health Maintenance Topics with due status: Not Due       Topic Last Completion Date    Hemoglobin A1c 09/21/2022    High Dose Statin 01/09/2023    Aspirin/Antiplatelet Therapy 01/09/2023     Health Maintenance Due   Topic Date Due    Hepatitis C Screening  Never done    Lipid Panel  Never done    Diabetes Urine Screening  Never done    Foot Exam  Never done    Eye Exam  Never done    COVID-19 Vaccine (3 - Booster for Pfizer series) 05/27/2021         Lab results available in Epic or see dates from [facility] above:   No results found for: CHOL  No results found for: HDL  No results found for: LDLCALC  No results found for: TRIG  No results found for: CHOLHDL    Lab Results   Component Value Date    HGBA1C 7.0 (H) 09/21/2022       Sodium   Date Value Ref Range Status   09/21/2022 138 136 - 145 mmol/L Final     Potassium   Date Value Ref Range Status   09/21/2022 4.8 3.5 - 5.1 mmol/L Final     Chloride   Date Value Ref Range Status   09/21/2022 102 98 - 107 mmol/L Final     CO2   Date Value Ref Range Status   09/21/2022 33 (H) 21 - 32 mmol/L Final     Glucose   Date Value Ref Range Status   09/21/2022 91 74 - 106 mg/dL Final     BUN   Date Value Ref Range Status   09/21/2022 21 (H) 7 - 18 mg/dL Final     Creatinine   Date Value Ref Range Status   09/21/2022 1.76 (H) 0.55 - 1.02 mg/dL Final     Calcium   Date Value Ref Range Status   09/21/2022 9.5 8.5 - 10.1 mg/dL Final     Total Protein   Date Value Ref Range Status   09/21/2022 8.5 (H) 6.4 - 8.2 g/dL Final     Albumin   Date Value Ref Range Status   09/21/2022 3.3 (L) 3.5 - 5.0 g/dL Final     Bilirubin, Total   Date Value Ref Range Status   09/21/2022 0.4 >0.0 - 1.2 mg/dL Final     Alk Phos   Date Value Ref Range  "Status   09/21/2022 105 55 - 142 U/L Final     AST   Date Value Ref Range Status   09/21/2022 16 15 - 37 U/L Final     ALT   Date Value Ref Range Status   09/21/2022 21 13 - 56 U/L Final     Anion Gap   Date Value Ref Range Status   09/21/2022 8 7 - 16 mmol/L Final     eGFR    Date Value Ref Range Status   09/14/2021 58 (L) >=60 mL/min/1.73m² Final     eGFR   Date Value Ref Range Status   07/18/2020 59           Incontinence  Bowel: no  Bladder: uses briefs      Care Team  Dr. Chaparro -PCP                 DAVIDE Bryson- cardiology                 Dr. Millard=-rheumatology    **See Completed Assessments for Annual Wellness visit within the encounter summary    The following assessments were completed & reviewed:  Depression Screening  Cognitive function Screening  Timed Get Up Test  Whisper Test  Vision Screen  Health Risk Assessment  Checklist of ADLs and IADLs        Objective  Vitals:    01/09/23 1537   BP: 128/66   Pulse: 72   Resp: 18   Temp: 98.2 °F (36.8 °C)   TempSrc: Oral   SpO2: 96%   Weight: 98 kg (216 lb)   Height: 5' 7" (1.702 m)   PainSc: 0-No pain      Body mass index is 33.83 kg/m².  Ideal body weight: 61.6 kg (135 lb 12.9 oz)       Physical Exam      Assessment:     1. Encounter for initial annual wellness visit (AWV) in Medicare patient    2. Primary hypertension    3. Cardiomyopathy, unspecified type    4. Type 2 diabetes mellitus with hyperglycemia, with long-term current use of insulin    5. Primary osteoarthritis of both knees    6. PVD (peripheral vascular disease)    7. Screening for viral disease  - Hepatitis C Antibody; Future  - Hepatitis C Antibody    8. Obesity (BMI 30.0-34.9)    9. Hyperlipidemia, unspecified hyperlipidemia type  - Lipid Panel; Future  - Lipid Panel    10. Allergic rhinitis, unspecified seasonality, unspecified trigger    11. Chronic obstructive pulmonary disease, unspecified COPD type         Plan:    Referrals:   None     Advised to call office if does not hear " from anyone with referral appt within 2-3 weeks to check on status of referral. Voiced understanding.      Discussed and provided with a screening schedule and personal prevention plan in accordance with USPSTF age appropriate recommendations and Medicare screening guidelines.   Education, counseling, and referrals were provided as needed.  After Visit Summary printed and given to patient which includes written education and a list of any referrals if indicated.     Education including diet, exercise, falls, preventive health care for older adults and advanced directives discussed with patient and patient verbalized understanding.      F/U plan for yearly Annual Wellness Visit  F/U with PCP as directed and/or sooner as needed    Signature: NATHAN Lucero-BC

## 2023-01-09 ENCOUNTER — OFFICE VISIT (OUTPATIENT)
Dept: FAMILY MEDICINE | Facility: CLINIC | Age: 78
End: 2023-01-09
Payer: COMMERCIAL

## 2023-01-09 ENCOUNTER — OFFICE VISIT (OUTPATIENT)
Dept: WOUND CARE | Facility: CLINIC | Age: 78
End: 2023-01-09
Attending: FAMILY MEDICINE
Payer: COMMERCIAL

## 2023-01-09 VITALS
HEART RATE: 71 BPM | RESPIRATION RATE: 18 BRPM | DIASTOLIC BLOOD PRESSURE: 76 MMHG | SYSTOLIC BLOOD PRESSURE: 145 MMHG | TEMPERATURE: 98 F

## 2023-01-09 VITALS
HEART RATE: 72 BPM | BODY MASS INDEX: 33.9 KG/M2 | HEIGHT: 67 IN | WEIGHT: 216 LBS | OXYGEN SATURATION: 96 % | TEMPERATURE: 98 F | SYSTOLIC BLOOD PRESSURE: 128 MMHG | RESPIRATION RATE: 18 BRPM | DIASTOLIC BLOOD PRESSURE: 66 MMHG

## 2023-01-09 DIAGNOSIS — L97.919 VENOUS ULCER OF RIGHT LEG: Primary | ICD-10-CM

## 2023-01-09 DIAGNOSIS — J44.9 CHRONIC OBSTRUCTIVE PULMONARY DISEASE, UNSPECIFIED COPD TYPE: ICD-10-CM

## 2023-01-09 DIAGNOSIS — E11.65 TYPE 2 DIABETES MELLITUS WITH HYPERGLYCEMIA, WITH LONG-TERM CURRENT USE OF INSULIN: ICD-10-CM

## 2023-01-09 DIAGNOSIS — M17.0 PRIMARY OSTEOARTHRITIS OF BOTH KNEES: ICD-10-CM

## 2023-01-09 DIAGNOSIS — J30.9 ALLERGIC RHINITIS, UNSPECIFIED SEASONALITY, UNSPECIFIED TRIGGER: ICD-10-CM

## 2023-01-09 DIAGNOSIS — E66.9 OBESITY (BMI 30.0-34.9): ICD-10-CM

## 2023-01-09 DIAGNOSIS — I42.9 CARDIOMYOPATHY, UNSPECIFIED TYPE: ICD-10-CM

## 2023-01-09 DIAGNOSIS — I73.9 PVD (PERIPHERAL VASCULAR DISEASE): ICD-10-CM

## 2023-01-09 DIAGNOSIS — I10 PRIMARY HYPERTENSION: ICD-10-CM

## 2023-01-09 DIAGNOSIS — Z00.00 ENCOUNTER FOR INITIAL ANNUAL WELLNESS VISIT (AWV) IN MEDICARE PATIENT: Primary | ICD-10-CM

## 2023-01-09 DIAGNOSIS — Z79.4 TYPE 2 DIABETES MELLITUS WITH HYPERGLYCEMIA, WITH LONG-TERM CURRENT USE OF INSULIN: ICD-10-CM

## 2023-01-09 DIAGNOSIS — L97.812 NON-PRESSURE CHRONIC ULCER OF OTHER PART OF RIGHT LOWER LEG WITH FAT LAYER EXPOSED: ICD-10-CM

## 2023-01-09 DIAGNOSIS — Z11.59 SCREENING FOR VIRAL DISEASE: ICD-10-CM

## 2023-01-09 DIAGNOSIS — I83.019 VENOUS ULCER OF RIGHT LEG: Primary | ICD-10-CM

## 2023-01-09 DIAGNOSIS — E78.5 HYPERLIPIDEMIA, UNSPECIFIED HYPERLIPIDEMIA TYPE: ICD-10-CM

## 2023-01-09 PROBLEM — M35.3 POLYMYALGIA RHEUMATICA: Status: ACTIVE | Noted: 2023-01-09

## 2023-01-09 PROBLEM — G60.3 IDIOPATHIC PROGRESSIVE POLYNEUROPATHY: Status: ACTIVE | Noted: 2023-01-09

## 2023-01-09 PROBLEM — R60.0 BILATERAL LOWER EXTREMITY EDEMA: Status: ACTIVE | Noted: 2023-01-09

## 2023-01-09 PROBLEM — J98.4 RESTRICTIVE LUNG DISEASE: Status: ACTIVE | Noted: 2023-01-09

## 2023-01-09 LAB
CHOLEST SERPL-MCNC: 165 MG/DL (ref 0–200)
CHOLEST/HDLC SERPL: 2.5 {RATIO}
HCV AB SER QL: NORMAL
HDLC SERPL-MCNC: 66 MG/DL (ref 40–60)
LDLC SERPL CALC-MCNC: 81 MG/DL
LDLC/HDLC SERPL: 1.2 {RATIO}
NONHDLC SERPL-MCNC: 99 MG/DL
TRIGL SERPL-MCNC: 92 MG/DL (ref 35–150)
VLDLC SERPL-MCNC: 18 MG/DL

## 2023-01-09 PROCEDURE — 1160F RVW MEDS BY RX/DR IN RCRD: CPT | Mod: CPTII,,, | Performed by: NURSE PRACTITIONER

## 2023-01-09 PROCEDURE — 3288F FALL RISK ASSESSMENT DOCD: CPT | Mod: ,,, | Performed by: NURSE PRACTITIONER

## 2023-01-09 PROCEDURE — 17250 CHEM CAUT OF GRANLTJ TISSUE: CPT | Mod: S$PBB,,, | Performed by: NURSE PRACTITIONER

## 2023-01-09 PROCEDURE — 1159F PR MEDICATION LIST DOCUMENTED IN MEDICAL RECORD: ICD-10-PCS | Mod: CPTII,,, | Performed by: NURSE PRACTITIONER

## 2023-01-09 PROCEDURE — 80061 LIPID PANEL: ICD-10-PCS | Mod: ,,, | Performed by: CLINICAL MEDICAL LABORATORY

## 2023-01-09 PROCEDURE — 17250 PR CHEM CAUTERY GRANULATN TISSUE: ICD-10-PCS | Mod: S$PBB,,, | Performed by: NURSE PRACTITIONER

## 2023-01-09 PROCEDURE — 3078F DIAST BP <80 MM HG: CPT | Mod: CPTII,,, | Performed by: NURSE PRACTITIONER

## 2023-01-09 PROCEDURE — 3074F SYST BP LT 130 MM HG: CPT | Mod: ,,, | Performed by: NURSE PRACTITIONER

## 2023-01-09 PROCEDURE — 1160F PR REVIEW ALL MEDS BY PRESCRIBER/CLIN PHARMACIST DOCUMENTED: ICD-10-PCS | Mod: ,,, | Performed by: NURSE PRACTITIONER

## 2023-01-09 PROCEDURE — 3288F PR FALLS RISK ASSESSMENT DOCUMENTED: ICD-10-PCS | Mod: CPTII,,, | Performed by: NURSE PRACTITIONER

## 2023-01-09 PROCEDURE — 1101F PR PT FALLS ASSESS DOC 0-1 FALLS W/OUT INJ PAST YR: ICD-10-PCS | Mod: ,,, | Performed by: NURSE PRACTITIONER

## 2023-01-09 PROCEDURE — 1126F AMNT PAIN NOTED NONE PRSNT: CPT | Mod: ,,, | Performed by: NURSE PRACTITIONER

## 2023-01-09 PROCEDURE — 1101F PT FALLS ASSESS-DOCD LE1/YR: CPT | Mod: ,,, | Performed by: NURSE PRACTITIONER

## 2023-01-09 PROCEDURE — 3077F PR MOST RECENT SYSTOLIC BLOOD PRESSURE >= 140 MM HG: ICD-10-PCS | Mod: CPTII,,, | Performed by: NURSE PRACTITIONER

## 2023-01-09 PROCEDURE — 1126F PR PAIN SEVERITY QUANTIFIED, NO PAIN PRESENT: ICD-10-PCS | Mod: ,,, | Performed by: NURSE PRACTITIONER

## 2023-01-09 PROCEDURE — 3074F PR MOST RECENT SYSTOLIC BLOOD PRESSURE < 130 MM HG: ICD-10-PCS | Mod: ,,, | Performed by: NURSE PRACTITIONER

## 2023-01-09 PROCEDURE — 3078F DIAST BP <80 MM HG: CPT | Mod: ,,, | Performed by: NURSE PRACTITIONER

## 2023-01-09 PROCEDURE — 1101F PT FALLS ASSESS-DOCD LE1/YR: CPT | Mod: CPTII,,, | Performed by: NURSE PRACTITIONER

## 2023-01-09 PROCEDURE — 99213 OFFICE O/P EST LOW 20 MIN: CPT | Mod: PBBFAC | Performed by: NURSE PRACTITIONER

## 2023-01-09 PROCEDURE — 86803 HEPATITIS C AB TEST: CPT | Mod: ,,, | Performed by: CLINICAL MEDICAL LABORATORY

## 2023-01-09 PROCEDURE — 1159F MED LIST DOCD IN RCRD: CPT | Mod: CPTII,,, | Performed by: NURSE PRACTITIONER

## 2023-01-09 PROCEDURE — 3078F PR MOST RECENT DIASTOLIC BLOOD PRESSURE < 80 MM HG: ICD-10-PCS | Mod: ,,, | Performed by: NURSE PRACTITIONER

## 2023-01-09 PROCEDURE — 3288F FALL RISK ASSESSMENT DOCD: CPT | Mod: CPTII,,, | Performed by: NURSE PRACTITIONER

## 2023-01-09 PROCEDURE — 1159F PR MEDICATION LIST DOCUMENTED IN MEDICAL RECORD: ICD-10-PCS | Mod: ,,, | Performed by: NURSE PRACTITIONER

## 2023-01-09 PROCEDURE — 99499 NO LOS: ICD-10-PCS | Mod: S$PBB,,, | Performed by: NURSE PRACTITIONER

## 2023-01-09 PROCEDURE — 80061 LIPID PANEL: CPT | Mod: ,,, | Performed by: CLINICAL MEDICAL LABORATORY

## 2023-01-09 PROCEDURE — 1159F MED LIST DOCD IN RCRD: CPT | Mod: ,,, | Performed by: NURSE PRACTITIONER

## 2023-01-09 PROCEDURE — 99499 UNLISTED E&M SERVICE: CPT | Mod: S$PBB,,, | Performed by: NURSE PRACTITIONER

## 2023-01-09 PROCEDURE — 1160F RVW MEDS BY RX/DR IN RCRD: CPT | Mod: ,,, | Performed by: NURSE PRACTITIONER

## 2023-01-09 PROCEDURE — 3078F PR MOST RECENT DIASTOLIC BLOOD PRESSURE < 80 MM HG: ICD-10-PCS | Mod: CPTII,,, | Performed by: NURSE PRACTITIONER

## 2023-01-09 PROCEDURE — 3288F PR FALLS RISK ASSESSMENT DOCUMENTED: ICD-10-PCS | Mod: ,,, | Performed by: NURSE PRACTITIONER

## 2023-01-09 PROCEDURE — 3077F SYST BP >= 140 MM HG: CPT | Mod: CPTII,,, | Performed by: NURSE PRACTITIONER

## 2023-01-09 PROCEDURE — 86803 HEPATITIS C ANTIBODY: ICD-10-PCS | Mod: ,,, | Performed by: CLINICAL MEDICAL LABORATORY

## 2023-01-09 PROCEDURE — 1101F PR PT FALLS ASSESS DOC 0-1 FALLS W/OUT INJ PAST YR: ICD-10-PCS | Mod: CPTII,,, | Performed by: NURSE PRACTITIONER

## 2023-01-09 PROCEDURE — G0438 PPPS, INITIAL VISIT: HCPCS | Mod: ,,, | Performed by: NURSE PRACTITIONER

## 2023-01-09 PROCEDURE — G0438 PR WELCOME MEDICARE ANNUAL WELLNESS INITIAL VISIT: ICD-10-PCS | Mod: ,,, | Performed by: NURSE PRACTITIONER

## 2023-01-09 PROCEDURE — 1126F AMNT PAIN NOTED NONE PRSNT: CPT | Mod: CPTII,,, | Performed by: NURSE PRACTITIONER

## 2023-01-09 PROCEDURE — 1126F PR PAIN SEVERITY QUANTIFIED, NO PAIN PRESENT: ICD-10-PCS | Mod: CPTII,,, | Performed by: NURSE PRACTITIONER

## 2023-01-09 PROCEDURE — 17250 CHEM CAUT OF GRANLTJ TISSUE: CPT | Mod: PBBFAC | Performed by: NURSE PRACTITIONER

## 2023-01-09 PROCEDURE — 1160F PR REVIEW ALL MEDS BY PRESCRIBER/CLIN PHARMACIST DOCUMENTED: ICD-10-PCS | Mod: CPTII,,, | Performed by: NURSE PRACTITIONER

## 2023-01-09 RX ORDER — ALBUTEROL SULFATE 90 UG/1
2 AEROSOL, METERED RESPIRATORY (INHALATION) EVERY 4 HOURS PRN
Qty: 18 G | Refills: 1 | Status: SHIPPED | OUTPATIENT
Start: 2023-01-09

## 2023-01-09 RX ORDER — PREDNISONE 10 MG/1
TABLET ORAL
Qty: 74 TABLET | Refills: 0 | Status: SHIPPED | OUTPATIENT
Start: 2023-01-09 | End: 2023-01-09 | Stop reason: ALTCHOICE

## 2023-01-09 NOTE — PATIENT INSTRUCTIONS
Counseling and Referral of Other Preventative  (Italic type indicates deductible and co-insurance are waived)    Patient Name: Delma Rae  Today's Date: 1/9/2023    Health Maintenance         Date Due Completion Date    Hepatitis C Screening Never done ---drawn this visit    Lipid Panel Never done ---drawn this vivit    COVID-19 Vaccine (1) Never done ---(pfizer) 3/11/21, 4/1/21    Diabetes Urine Screening Never done ---drawn this visit    Foot Exam Never done ---    Eye Exam Never done ---[facility] faxed    TETANUS VACCINE Never done ---declined    Shingles Vaccine (1 of 2) Never done ---declined    DEXA Scan Never done ---declined    Colorectal Cancer Screening 07/08/2018 7/8/2013    Hemoglobin A1c 03/21/2023 9/21/2022    High Dose Statin 11/04/2023 11/4/2022    Aspirin/Antiplatelet Therapy 11/04/2023 11/4/2022          No orders of the defined types were placed in this encounter.

## 2023-01-09 NOTE — PROGRESS NOTES
Debridement Performed for Assessment: Wound # 2  Performed By: Provider;Shannan De Souza NP  Assistant: DORA Arriaga RN    Debridement: Chemical/enzymatic  Debridement Description: Non-Selective      Wound/Ulcer size:     Length:  3  Width:   5   Depth:   0.4  Cm2: 15    Photo taken post procedure:  Procedural Pain: Insensate  Post Procedural Pain: Insensate  Response to Treatment: Procedure was tolerated well    Specimen  Was a specimen collected?  No Specimen collected      Graft or Implant Aplpied  Was a graft of implant applied?  No      Post deridement diagnosis  Did the post debridment diagnosis chagne?  The post debridment diagnosis is the same as the pre-op diagnosis.      Assistant of procedure  Who assisted with the procedure?  The assistant was the same as the nurse listed above.      Complication  Complications related to debridement?  No complications noted      Estimated Blood Loss  How much blood loss occured?  No blood loss      Anesthesia  Anesthesia used?  None Debridement Performed for Assessment:  Estimated Blood Loss  How much blood loss occured?  No blood loss      Anesthesia  Anesthesia used?  None     Debridement Performed for Assessment: Wound: right proximal leg  Performed By: Provider;Shannan De Souza NP  Assistant: DORA Arriaga RN    Debridement: Chemical/enzymatic  Debridement Description: Non-Selective      Wound/Ulcer size:    Length: 3.5   Width:  1.4   Depth: 0.3  Cm2: 4.9    Photo taken post procedure:  Procedural Pain: Insensate  Post Procedural Pain: Insensate  Response to Treatment: Procedure was tolerated well    Specimen  Was a specimen collected?  No Specimen collected      Graft or Implant Aplpied  Was a graft of implant applied?  No      Post deridement diagnosis  Did the post debridment diagnosis chagne?  The post debridment diagnosis is the same as the pre-op diagnosis.      Assistant of procedure  Who assisted with the procedure?  The assistant was the same as the  nurse listed above.      Complication  Complications related to debridement?  No complications noted      Estimated Blood Loss  How much blood loss occured?  No blood loss      Anesthesia  Anesthesia used?  None Debridement Performed for Assessment:  Estimated Blood Loss  How much blood loss occured?  No blood loss      Anesthesia  Anesthesia used?  None

## 2023-01-10 NOTE — ASSESSMENT & PLAN NOTE
Clean with baby shampoo and water  Apply aloe vesta anti fungal ointment to leg then apply mepitel over wound then apply Aquacel Ag (cut larger than wound) cover with dry gauze,Wrap with kerlix and ace wrap or sure press from toes to just below knee  Change daily and as needed  Apply large edema wear to left leg  Elevate legs as much as possible  Avoid prolonged standing  Monitor closely for s/s of infection including fever, chills, increase in pain, odor from wound, and increased redness from foot. Go to ER if any complications develop.   Keep leg elevated and avoid pressure on wound.  Diabetes:  Monitor glucose closely. Check fasting glucose and 2 hours after meals. HgA1C goal <7, fasting glucose , and 2 hours after meals <180  Hypertension:  Check blood pressure twice daily, goal <120/80  Diet:   Increase protein intake, avoid fried, fatty foods and foods high in simple carbs.   Vitamins:  Take vitamin C 1000 mg, zinc 50mg, vitamin d 5000 units, and a daily multivitamin. Ernie is a good source of protein and nutrients to aid in wound healing.

## 2023-01-17 RX ORDER — METOPROLOL TARTRATE 25 MG/1
25 TABLET, FILM COATED ORAL 3 TIMES DAILY
Qty: 90 TABLET | Refills: 1 | Status: SHIPPED | OUTPATIENT
Start: 2023-01-17 | End: 2023-01-18

## 2023-01-17 NOTE — TELEPHONE ENCOUNTER
----- Message from Neda Frank sent at 1/17/2023  8:57 AM CST -----  Pt needing metopolol 25mg sent to [facility].   [phone]

## 2023-01-26 ENCOUNTER — OFFICE VISIT (OUTPATIENT)
Dept: WOUND CARE | Facility: CLINIC | Age: 78
End: 2023-01-26
Attending: FAMILY MEDICINE
Payer: COMMERCIAL

## 2023-01-26 VITALS
SYSTOLIC BLOOD PRESSURE: 140 MMHG | RESPIRATION RATE: 20 BRPM | HEART RATE: 75 BPM | DIASTOLIC BLOOD PRESSURE: 69 MMHG | TEMPERATURE: 98 F

## 2023-01-26 DIAGNOSIS — I83.019 VENOUS ULCER OF RIGHT LEG: ICD-10-CM

## 2023-01-26 DIAGNOSIS — L97.919 VENOUS ULCER OF RIGHT LEG: ICD-10-CM

## 2023-01-26 PROCEDURE — 3078F DIAST BP <80 MM HG: CPT | Mod: CPTII,,, | Performed by: NURSE PRACTITIONER

## 2023-01-26 PROCEDURE — 1159F PR MEDICATION LIST DOCUMENTED IN MEDICAL RECORD: ICD-10-PCS | Mod: CPTII,,, | Performed by: NURSE PRACTITIONER

## 2023-01-26 PROCEDURE — 1160F RVW MEDS BY RX/DR IN RCRD: CPT | Mod: CPTII,,, | Performed by: NURSE PRACTITIONER

## 2023-01-26 PROCEDURE — 3077F PR MOST RECENT SYSTOLIC BLOOD PRESSURE >= 140 MM HG: ICD-10-PCS | Mod: CPTII,,, | Performed by: NURSE PRACTITIONER

## 2023-01-26 PROCEDURE — 1126F PR PAIN SEVERITY QUANTIFIED, NO PAIN PRESENT: ICD-10-PCS | Mod: CPTII,,, | Performed by: NURSE PRACTITIONER

## 2023-01-26 PROCEDURE — 99215 OFFICE O/P EST HI 40 MIN: CPT | Mod: PBBFAC | Performed by: NURSE PRACTITIONER

## 2023-01-26 PROCEDURE — 1160F PR REVIEW ALL MEDS BY PRESCRIBER/CLIN PHARMACIST DOCUMENTED: ICD-10-PCS | Mod: CPTII,,, | Performed by: NURSE PRACTITIONER

## 2023-01-26 PROCEDURE — 3078F PR MOST RECENT DIASTOLIC BLOOD PRESSURE < 80 MM HG: ICD-10-PCS | Mod: CPTII,,, | Performed by: NURSE PRACTITIONER

## 2023-01-26 PROCEDURE — 99213 PR OFFICE/OUTPT VISIT, EST, LEVL III, 20-29 MIN: ICD-10-PCS | Mod: S$PBB,,, | Performed by: NURSE PRACTITIONER

## 2023-01-26 PROCEDURE — 1159F MED LIST DOCD IN RCRD: CPT | Mod: CPTII,,, | Performed by: NURSE PRACTITIONER

## 2023-01-26 PROCEDURE — 99213 OFFICE O/P EST LOW 20 MIN: CPT | Mod: S$PBB,,, | Performed by: NURSE PRACTITIONER

## 2023-01-26 PROCEDURE — 3077F SYST BP >= 140 MM HG: CPT | Mod: CPTII,,, | Performed by: NURSE PRACTITIONER

## 2023-01-26 PROCEDURE — 1126F AMNT PAIN NOTED NONE PRSNT: CPT | Mod: CPTII,,, | Performed by: NURSE PRACTITIONER

## 2023-01-26 NOTE — PROGRESS NOTES
MYA Escobar   [facility]  [address] [facility] [place] [address]  [phone]      PATIENT NAME: Delma Rae  : 1945  DATE: 23  MRN: [id number]      Billing Provider: MYA Escobar  Level of Service:   Patient PCP Information       Provider PCP Type    Fco Chaparro MD General            Reason for Visit / Chief Complaint: Non-healing Wound Follow Up (RLE)       History of Present Illness / Problem Focused Workflow     Delma Rae is a 76 y.o. female who presents to clinic for follow up on chronic-non healing wound on the right lower leg. Wound has improved since last visit. She is taking steroids as prescribed.  Continue current POC.     Arterial doppler 2022, No areas of occlusion. No doubling of peak systolic velocity between adjacent stations to specifically suggest high-grade stenosis of a focal nature. Ankle brachial indices measure normal at 1.27 right and 1.19 left.   Significant PMH of CHF, COPD, hypertension, MI, rheumatoid arthritis, hyperlipidemia, GERD, and diabetes. Last HgA1C 7 in , diabetes managed by PCP.  Pertinent factors that delay wound healing include multiple co-morbidities, poor vascular supply, diabetes, edema, heavy drainage, excessive wound moisture and macerated tissue, large surface area, advanced age and fragile skin. Denies fever and chills.      Review of Systems     Review of Systems   Constitutional:  Positive for activity change. Negative for chills and fever.   Respiratory:  Negative for chest tightness and shortness of breath.    Cardiovascular:  Positive for leg swelling. Negative for chest pain and palpitations.   Musculoskeletal:  Positive for arthralgias and joint swelling.   Skin:  Positive for wound.        See wound assessment   Neurological:  Positive for weakness and numbness.   Psychiatric/Behavioral:  Negative for agitation, behavioral problems, confusion  and self-injury.      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 Chaidez    GERD (gastroesophageal reflux disease)     HHD (hypertensive heart disease)     Hyperlipidemia     Hypertension     MI (myocardial infarction)     OA (osteoarthritis)     Rheumatoid arthritis        Past Surgical History:   Procedure Laterality Date    HYSTERECTOMY      OOPHORECTOMY      VAGINAL DELIVERY      x 1       Social History  Ms. Delma Rae  reports that she has never smoked. She has never used smokeless tobacco. She reports that she does not drink alcohol and does not use drugs.    Family History  Ms.'s Delma Rae family history includes Cancer in her brother and father; Coronary artery disease in her brother and sister; Diabetes in her father and mother; Hypertension in her mother and son; Prostate cancer in her father.    Medications and Allergies     Medications  Outpatient Medications Marked as Taking for the 1/26/23 encounter (Office Visit) with MYA Escobar   Medication Sig Dispense Refill    albuterol (PROVENTIL/VENTOLIN HFA) 90 mcg/actuation inhaler Inhale 2 puffs into the lungs every 4 (four) hours as needed for Wheezing or Shortness of Breath. 18 g 1    amitriptyline (ELAVIL) 10 MG tablet Take 1 tablet by mouth every evening.      aspirin (ECOTRIN) 81 MG EC tablet Take 81 mg by mouth once daily.      atorvastatin (LIPITOR) 40 MG tablet Take 1 tablet (40 mg total) by mouth once daily. 90 tablet 1    blood sugar diagnostic (ONETOUCH ULTRA BLUE TEST STRIP) Strp 1 strip by Other route 4 (four) times daily before meals and nightly. Dx e11.9 200 strip 5     "brimonidine 0.2% (ALPHAGAN) 0.2 % Drop Place 1 drop into both eyes 2 (two) times daily.      ferrous sulfate 325 (65 FE) MG EC tablet Take 1 tablet (325 mg total) by mouth once daily. 90 tablet 1    fluticasone furoate-vilanteroL (BREO ELLIPTA) 100-25 mcg/dose diskus inhaler Inhale 1 puff into the lungs daily as needed.      FLUTICASONE PROPIONATE NASL 1 spray by Each Nostril route daily as needed.      folic acid (FOLVITE) 1 MG tablet Take 1 mg by mouth once daily.      furosemide (LASIX) 40 MG tablet Take 1 tablet (40 mg total) by mouth 2 (two) times a day. 60 tablet 0    hydrALAZINE (APRESOLINE) 25 MG tablet TAKE ONE TABLET BY MOUTH THREE TIMES DAILY 270 tablet 1    hydrOXYchloroQUINE (PLAQUENIL) 200 mg tablet Take 1 tablet (200 mg total) by mouth once daily. 90 tablet 1    insulin NPH-insulin regular, 70/30, (NOVOLIN 70/30 U-100 INSULIN) 100 unit/mL (70-30) injection INJECT 40 UNITS UNDER SKIN IN THE MORNING AND 15 IN THE EVENING. 50 mL 6    insulin syringe-needle U-100 1 mL 31 gauge x 5/16 Syrg Inject 1 each into the skin 2 (two) times a day. Dx e11.9 200 each 3    insulin syringe-needle U-100 1/2 mL 31 gauge x 15/64" Syrg by Misc.(Non-Drug; Combo Route) route. Use as directed with insulin      lancets Misc 1 each by skin prick route 4 (four) times daily before meals and nightly. 200 each 5    latanoprost 0.005 % ophthalmic solution Place 1 drop into both eyes once daily.      loratadine (CLARITIN) 10 mg tablet Take 1 tablet (10 mg total) by mouth once daily. 90 tablet 1    losartan (COZAAR) 100 MG tablet Take 1 tablet (100 mg total) by mouth once daily. 30 tablet 5    meloxicam (MOBIC) 7.5 MG tablet Take 1 tablet (7.5 mg total) by mouth once daily. 30 tablet 1    methotrexate 2.5 MG Tab Take 2.5 mg by mouth every 7 days. 5 tablets q7 days      metoprolol tartrate (LOPRESSOR) 25 MG tablet TAKE ONE TABLET BY MOUTH THREE TIMES DAILY 90 tablet 1    multivitamin/iron/folic acid (CENTRUM ULTRA " WOMEN'S ORAL) Take 1 tablet by mouth once daily.      naproxen (NAPROSYN) 500 MG tablet Take 1 tablet (500 mg total) by mouth 2 (two) times daily as needed. 20 tablet 1    omeprazole (PRILOSEC) 20 MG capsule Take 1 capsule (20 mg total) by mouth once daily. 90 capsule 1    potassium chloride SA (K-DUR,KLOR-CON M) 10 MEQ tablet Take 1 tablet (10 mEq total) by mouth 2 (two) times daily. 60 tablet 3    predniSONE (DELTASONE) 2.5 MG tablet Take 2.5 mg by mouth once daily at 6am.      pregabalin (LYRICA) 50 MG capsule Take 1 capsule by mouth 3 (three) times daily.      torsemide (DEMADEX) 20 MG Tab Take 2 tablets (40 mg total) by mouth every morning. 180 tablet 1    triamcinolone acetonide 0.1% (KENALOG) 0.1 % cream Apply topically 2 (two) times daily. Apply to affected area twice a day as needed 454 g 1    venlafaxine (EFFEXOR-XR) 75 MG 24 hr capsule Take 1 capsule (75 mg total) by mouth every other day. 45 capsule 1    zolpidem (AMBIEN) 5 MG Tab Take 1 tablet (5 mg total) by mouth nightly as needed. 1 tablet 0       Allergies  Review of patient's allergies indicates:  No Known Allergies    Physical Examination     Vitals:    01/26/23 1404   BP: (!) 140/69   Pulse: 75   Resp: 20   Temp: 97.7 °F (36.5 °C)     Physical Exam  Vitals and nursing note reviewed.   Constitutional:       Appearance: Normal appearance.   HENT:      Head: Normocephalic.   Cardiovascular:      Rate and Rhythm: Normal rate and regular rhythm.      Pulses: Normal pulses.      Heart sounds: Normal heart sounds.   Pulmonary:      Effort: Pulmonary effort is normal. No respiratory distress.   Chest:      Chest wall: No tenderness.   Musculoskeletal:         General: Swelling present.      Right lower leg: Edema present.      Left lower leg: Edema present.   Skin:     General: Skin is warm and dry.      Capillary Refill: Capillary refill takes 2 to 3 seconds.      Findings: Erythema present.      Comments: See LDA for photo and measurements    Neurological:      General: No focal deficit present.      Mental Status: She is alert and oriented to person, place, and time. Mental status is at baseline.   Psychiatric:         Mood and Affect: Mood normal.         Behavior: Behavior normal.         Thought Content: Thought content normal.         Judgment: Judgment normal.     Assessment and Plan             Altered Skin Integrity 11/03/22 Right posterior Calf #2 Venous Ulcer Full thickness tissue loss. Subcutaneous fat may be visible but bone, tendon or muscle are not exposed (Active)   11/03/22    Altered Skin Integrity Present on Admission: yes   Side: Right   Orientation: posterior   Location: Calf   Wound Number: #2   Is this injury device related?: No   Primary Wound Type: Venous ulcer   Description of Altered Skin Integrity: Full thickness tissue loss. Subcutaneous fat may be visible but bone, tendon or muscle are not exposed   Ankle-Brachial Index:    Pulses:    Removal Indication and Assessment:    Wound Outcome:    (Retired) Wound Length (cm):    (Retired) Wound Width (cm):    (Retired) Depth (cm):    Wound Description (Comments):    Removal Indications:    Wound Image   01/26/23 1414   Description of Altered Skin Integrity Full thickness tissue loss. Subcutaneous fat may be visible but bone, tendon or muscle are not exposed 01/26/23 1414   Dressing Appearance Intact 01/26/23 1414   Drainage Amount Moderate 01/26/23 1414   Drainage Characteristics/Odor Sanguineous 01/26/23 1414   Appearance Pink;Moist;Granulating 01/26/23 1414   Tissue loss description Full thickness 01/26/23 1414   Black (%), Wound Tissue Color 0 % 01/26/23 1414   Red (%), Wound Tissue Color 100 % 01/26/23 1414   Yellow (%), Wound Tissue Color 0 % 01/26/23 1414   Periwound Area Intact 01/26/23 1414   Wound Length (cm) 3.3 cm 01/26/23 1414   Wound Width (cm) 6.6 cm 01/26/23 1414   Wound Depth (cm) 0.3 cm 01/26/23 1414   Wound Volume (cm^3) 6.534 cm^3 01/26/23 1414   Wound Surface  Area (cm^2) 21.78 cm^2 01/26/23 1414   Care Cleansed with:;Antimicrobial agent 01/26/23 1414   Dressing Applied;Gauze, wet to dry;Gauze;Compression wrap 01/26/23 1414   Dressing Change Due 01/27/23 01/26/23 1414            Altered Skin Integrity 01/09/23 1328 Right anterior;lower;proximal Leg (Active)   01/09/23 1328   Altered Skin Integrity Present on Admission:    Side: Right   Orientation: anterior;lower;proximal   Location: Leg   Wound Number:    Is this injury device related?:    Primary Wound Type:    Description of Altered Skin Integrity:    Ankle-Brachial Index:    Pulses:    Removal Indication and Assessment:    Wound Outcome:    (Retired) Wound Length (cm):    (Retired) Wound Width (cm):    (Retired) Depth (cm):    Wound Description (Comments):    Removal Indications:    Wound Image    01/26/23 1411   Description of Altered Skin Integrity Full thickness tissue loss. Subcutaneous fat may be visible but bone, tendon or muscle are not exposed 01/26/23 1411   Dressing Appearance Intact 01/26/23 1411   Drainage Amount Moderate 01/26/23 1411   Drainage Characteristics/Odor Serosanguineous 01/26/23 1411   Appearance Pink;Moist;Granulating 01/26/23 1411   Tissue loss description Full thickness 01/26/23 1411   Black (%), Wound Tissue Color 0 % 01/26/23 1411   Red (%), Wound Tissue Color 100 % 01/26/23 1411   Yellow (%), Wound Tissue Color 0 % 01/26/23 1411   Periwound Area Intact 01/26/23 1411   Wound Length (cm) 1.1 cm 01/26/23 1411   Wound Width (cm) 7 cm 01/26/23 1411   Wound Depth (cm) 0.3 cm 01/26/23 1411   Wound Volume (cm^3) 2.31 cm^3 01/26/23 1411   Wound Surface Area (cm^2) 7.7 cm^2 01/26/23 1411   Care Cleansed with:;Antimicrobial agent 01/26/23 1411   Dressing Applied;Gauze, wet to dry;Gauze 01/26/23 1411   Dressing Change Due 01/27/23 01/26/23 1411            Wound 09/27/21 Right lower;lateral Leg #3 (Active)   09/27/21     Pre-existing:    Primary Wound Type:    Side: Right   Orientation: lower;lateral    Location: Leg   Wound Number: #3   Ankle-Brachial Index:    Pulses:    Removal Indication and Assessment:    Wound Outcome:    (Retired) Wound Type:    (Retired) Wound Length (cm):    (Retired) Wound Width (cm):    (Retired) Depth (cm):    Wound Description (Comments):    Removal Indications:    Wound Image    01/26/23 1412   Dressing Appearance Dry;Intact 01/26/23 1412   Drainage Amount Moderate 01/26/23 1412   Drainage Characteristics/Odor Serosanguineous 01/26/23 1412   Appearance Pink;Moist;Granulating 01/26/23 1412   Tissue loss description Full thickness 01/26/23 1412   Black (%), Wound Tissue Color 0 % 01/26/23 1412   Red (%), Wound Tissue Color 100 % 01/26/23 1412   Yellow (%), Wound Tissue Color 0 % 01/26/23 1412   Periwound Area Intact 01/26/23 1412   Wound Edges Open 01/26/23 1412   Wound Length (cm) 4.5 cm 01/26/23 1412   Wound Width (cm) 2 cm 01/26/23 1412   Wound Depth (cm) 0.3 cm 01/26/23 1412   Wound Volume (cm^3) 2.7 cm^3 01/26/23 1412   Wound Surface Area (cm^2) 9 cm^2 01/26/23 1412   Care Cleansed with:;Antimicrobial agent 01/26/23 1412   Dressing Applied;Gauze, wet to dry;Gauze;Rolled gauze;Compression wrap 01/26/23 1412   Periwound Care Moisture barrier applied 01/26/23 1412   Compression Two layer compression 01/26/23 1412   Dressing Change Due 01/27/23 01/26/23 1412     Problem List Items Addressed This Visit          Orthopedic    Venous ulcer of right leg    Overview                                  Current Assessment & Plan     Clean with baby shampoo and water  Apply aloe vesta anti fungal ointment to leg then apply mepitel over wound then apply Aquacel Ag (cut larger than wound) cover with dry gauze,Wrap with kerlix and ace wrap or sure press from toes to just below knee  Change daily and as needed  Apply large edema wear to left leg  Elevate legs as much as possible  Avoid prolonged standing  Monitor closely for s/s of infection including fever, chills, increase in pain, odor from  wound, and increased redness from foot. Go to ER if any complications develop.   Keep leg elevated and avoid pressure on wound.  Diabetes:  Monitor glucose closely. Check fasting glucose and 2 hours after meals. HgA1C goal <7, fasting glucose , and 2 hours after meals <180  Hypertension:  Check blood pressure twice daily, goal <120/80  Diet:   Increase protein intake, avoid fried, fatty foods and foods high in simple carbs.   Vitamins:  Take vitamin C 1000 mg, zinc 50mg, vitamin d 5000 units, and a daily multivitamin. Ernie is a good source of protein and nutrients to aid in wound healing.             Future Appointments   Date Time Provider Department Center   2/13/2023  1:45 PM Fco Chaparro MD [facility]   2/23/2023  1:00 PM MYA Escobar [facility]    2/28/2023  2:00 PM CRISTINA Smith [facility]   1/12/2024  3:00 PM AWV NURSE, [facility]ILY [facility]            Signature:  MYA Escobar  [facility]  [address]  [place] [address]  [phone]    Date of encounter: 1/26/23

## 2023-02-01 ENCOUNTER — DOCUMENT SCAN (OUTPATIENT)
Dept: HOME HEALTH SERVICES | Facility: HOSPITAL | Age: 78
End: 2023-02-01
Payer: COMMERCIAL

## 2023-02-08 RX ORDER — FUROSEMIDE 40 MG/1
40 TABLET ORAL 2 TIMES DAILY
Qty: 60 TABLET | Refills: 1 | Status: SHIPPED | OUTPATIENT
Start: 2023-02-08 | End: 2023-04-11

## 2023-02-08 NOTE — TELEPHONE ENCOUNTER
----- Message from Mj Mendoza sent at 2/8/2023  8:50 AM CST -----  Pt called and asked for a refill on     furosemide (LASIX) 40 MG tablet    [phone]

## 2023-02-25 ENCOUNTER — EXTERNAL HOME HEALTH (OUTPATIENT)
Dept: HOME HEALTH SERVICES | Facility: HOSPITAL | Age: 78
End: 2023-02-25
Payer: COMMERCIAL

## 2023-02-28 ENCOUNTER — OFFICE VISIT (OUTPATIENT)
Dept: CARDIOLOGY | Facility: CLINIC | Age: 78
End: 2023-02-28
Payer: COMMERCIAL

## 2023-02-28 VITALS
HEIGHT: 67 IN | OXYGEN SATURATION: 96 % | HEART RATE: 75 BPM | BODY MASS INDEX: 32.49 KG/M2 | WEIGHT: 207 LBS | SYSTOLIC BLOOD PRESSURE: 112 MMHG | DIASTOLIC BLOOD PRESSURE: 68 MMHG

## 2023-02-28 DIAGNOSIS — I25.10 CORONARY ARTERY DISEASE, UNSPECIFIED VESSEL OR LESION TYPE, UNSPECIFIED WHETHER ANGINA PRESENT, UNSPECIFIED WHETHER NATIVE OR TRANSPLANTED HEART: Primary | ICD-10-CM

## 2023-02-28 DIAGNOSIS — I50.9 CONGESTIVE HEART FAILURE, UNSPECIFIED HF CHRONICITY, UNSPECIFIED HEART FAILURE TYPE: ICD-10-CM

## 2023-02-28 PROCEDURE — 3074F SYST BP LT 130 MM HG: CPT | Mod: CPTII,,, | Performed by: NURSE PRACTITIONER

## 2023-02-28 PROCEDURE — 3078F DIAST BP <80 MM HG: CPT | Mod: CPTII,,, | Performed by: NURSE PRACTITIONER

## 2023-02-28 PROCEDURE — 3078F PR MOST RECENT DIASTOLIC BLOOD PRESSURE < 80 MM HG: ICD-10-PCS | Mod: CPTII,,, | Performed by: NURSE PRACTITIONER

## 2023-02-28 PROCEDURE — 1159F PR MEDICATION LIST DOCUMENTED IN MEDICAL RECORD: ICD-10-PCS | Mod: CPTII,,, | Performed by: NURSE PRACTITIONER

## 2023-02-28 PROCEDURE — 93005 ELECTROCARDIOGRAM TRACING: CPT | Mod: PBBFAC | Performed by: INTERNAL MEDICINE

## 2023-02-28 PROCEDURE — 99214 OFFICE O/P EST MOD 30 MIN: CPT | Mod: S$PBB,,, | Performed by: NURSE PRACTITIONER

## 2023-02-28 PROCEDURE — 99215 OFFICE O/P EST HI 40 MIN: CPT | Mod: PBBFAC | Performed by: NURSE PRACTITIONER

## 2023-02-28 PROCEDURE — 93010 EKG 12-LEAD: ICD-10-PCS | Mod: S$PBB,,, | Performed by: INTERNAL MEDICINE

## 2023-02-28 PROCEDURE — 99214 PR OFFICE/OUTPT VISIT, EST, LEVL IV, 30-39 MIN: ICD-10-PCS | Mod: S$PBB,,, | Performed by: NURSE PRACTITIONER

## 2023-02-28 PROCEDURE — 1159F MED LIST DOCD IN RCRD: CPT | Mod: CPTII,,, | Performed by: NURSE PRACTITIONER

## 2023-02-28 PROCEDURE — 3074F PR MOST RECENT SYSTOLIC BLOOD PRESSURE < 130 MM HG: ICD-10-PCS | Mod: CPTII,,, | Performed by: NURSE PRACTITIONER

## 2023-02-28 PROCEDURE — 93010 ELECTROCARDIOGRAM REPORT: CPT | Mod: S$PBB,,, | Performed by: INTERNAL MEDICINE

## 2023-02-28 RX ORDER — METOPROLOL TARTRATE 25 MG/1
25 TABLET, FILM COATED ORAL 3 TIMES DAILY
Qty: 90 TABLET | Refills: 5 | Status: SHIPPED | OUTPATIENT
Start: 2023-02-28 | End: 2023-06-21 | Stop reason: ALTCHOICE

## 2023-02-28 RX ORDER — LOSARTAN POTASSIUM 100 MG/1
100 TABLET ORAL DAILY
Qty: 90 TABLET | Refills: 5 | Status: SHIPPED | OUTPATIENT
Start: 2023-02-28

## 2023-03-01 NOTE — PROGRESS NOTES
"[facility] note        DATE OF SERVICE: 03/01/2023       PCP: Fco Chaparro MD      CHIEF COMPLAINT:   Chief Complaint   Patient presents with    Edema     Wears compression        HISTORY OF PRESENT ILLNESS:  Delma Rae is a 77 y.o. 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 Chaidez    GERD (gastroesophageal reflux disease)     HHD (hypertensive heart disease)     Hyperlipidemia     Hypertension     MI (myocardial infarction)     OA (osteoarthritis)     Rheumatoid arthritis      who presents for routine follow up. Patient's only complaint is lower ext edema that resolves at HS.She denies chest pain, pressure, heaviness, tightness, orthopnea (sleeps on one pillow), pnd, palpitations, syncope or near syncope. She states that she "walks a little just in her house" but has no issues when she is active. Patient's son reports that Dr. Chaparro put her back on the torsemide and the lasix. I will defer to the PCP.      5/10/2021 Patient's son reports that she was taking both lasix and torsemide until Sunday and ran out of lasix on Sunday. She has had no fluid retention since. We will stop the lasix and continue the torsemide. IF she begins having fluid retention her son is to increase the torsemide to bid dosing and call our office for further instructions.       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 Chaidez    GERD (gastroesophageal reflux disease)     HHD (hypertensive heart disease)     Hyperlipidemia     Hypertension     MI (myocardial infarction)     OA (osteoarthritis)     Rheumatoid arthritis        PAST SURGICAL HISTORY:  Past Surgical History:   Procedure Laterality Date    HYSTERECTOMY      OOPHORECTOMY      VAGINAL DELIVERY      x 1       SOCIAL HISTORY:  Social History     Socioeconomic History    Marital status:    Tobacco Use    Smoking status: Never    Smokeless tobacco: Never   Substance and Sexual Activity    Alcohol use: Never    Drug use: Never    Sexual activity: Not Currently     Partners: Male       FAMILY HISTORY:  Family History   Problem Relation Age of Onset    Diabetes Mother         age 60    Hypertension Mother     Diabetes Father     Prostate cancer Father     Cancer Father         Prostate, age 85    Coronary artery disease Sister         age 72    Cancer Brother     Coronary artery disease Brother     Hypertension Son         age 50         ALLERGIES:  Review of patient's allergies indicates:  No Known Allergies     MEDICATIONS:    Current Outpatient Medications:     aspirin (ECOTRIN) 81 MG EC tablet, Take 81 mg by mouth once daily., Disp: , Rfl:     atorvastatin (LIPITOR) 40 MG tablet, Take 1 tablet (40 mg total) by mouth once daily., Disp: 90 tablet, Rfl: 1    blood sugar diagnostic (ONETOUCH ULTRA BLUE TEST STRIP) Strp, 1 strip by Other route 4 (four) times daily before meals and nightly. Dx e11.9, Disp: 200 strip, Rfl: 5    ferrous sulfate 325 (65 FE) MG EC tablet, Take 1 tablet (325 mg total) by mouth once daily., Disp: 90 tablet, Rfl: 1    folic acid (FOLVITE) 1 MG tablet, Take 1 mg by mouth once daily., Disp: , Rfl:     furosemide (LASIX) 40 MG tablet, Take 1 tablet (40 mg total) by mouth 2 (two) times a day., Disp: 60 tablet, Rfl: 1    hydrALAZINE (APRESOLINE) 25 MG  "tablet, TAKE ONE TABLET BY MOUTH THREE TIMES DAILY, Disp: 270 tablet, Rfl: 1    hydrOXYchloroQUINE (PLAQUENIL) 200 mg tablet, Take 1 tablet (200 mg total) by mouth once daily. (Patient taking differently: Take 200 mg by mouth 2 (two) times daily.), Disp: 90 tablet, Rfl: 1    insulin NPH-insulin regular, 70/30, (NOVOLIN 70/30 U-100 INSULIN) 100 unit/mL (70-30) injection, INJECT 40 UNITS UNDER SKIN IN THE MORNING AND 15 IN THE EVENING., Disp: 50 mL, Rfl: 6    insulin syringe-needle U-100 1 mL 31 gauge x 5/16 Syrg, Inject 1 each into the skin 2 (two) times a day. Dx e11.9, Disp: 200 each, Rfl: 3    insulin syringe-needle U-100 1/2 mL 31 gauge x 15/64" Syrg, by Misc.(Non-Drug; Combo Route) route. Use as directed with insulin, Disp: , Rfl:     lancets Misc, 1 each by skin prick route 4 (four) times daily before meals and nightly., Disp: 200 each, Rfl: 5    loratadine (CLARITIN) 10 mg tablet, Take 1 tablet (10 mg total) by mouth once daily., Disp: 90 tablet, Rfl: 1    methotrexate 2.5 MG Tab, Take 2.5 mg by mouth every 7 days. 5 tablets q7 days, Disp: , Rfl:     multivit-min-FA-lycopen-lutein (CENTRUM SILVER) 0.4 mg-300 mcg- 250 mcg Tab, Take 1 tablet by mouth once daily., Disp: , Rfl:     naproxen (NAPROSYN) 500 MG tablet, Take 1 tablet (500 mg total) by mouth 2 (two) times daily as needed., Disp: 20 tablet, Rfl: 1    omeprazole (PRILOSEC) 20 MG capsule, Take 1 capsule (20 mg total) by mouth once daily., Disp: 90 capsule, Rfl: 1    potassium chloride SA (K-DUR,KLOR-CON M) 10 MEQ tablet, Take 1 tablet (10 mEq total) by mouth 2 (two) times daily., Disp: 60 tablet, Rfl: 3    predniSONE (DELTASONE) 2.5 MG tablet, Take 2.5 mg by mouth once daily at 6am., Disp: , Rfl:     torsemide (DEMADEX) 20 MG Tab, Take 2 tablets (40 mg total) by mouth every morning., Disp: 180 tablet, Rfl: 1    venlafaxine (EFFEXOR-XR) 75 MG 24 hr capsule, Take 1 capsule (75 mg total) by mouth every other day., Disp: 45 capsule, Rfl: 1    zolpidem " "(AMBIEN) 5 MG Tab, Take 1 tablet (5 mg total) by mouth nightly as needed., Disp: 1 tablet, Rfl: 0    albuterol (PROVENTIL/VENTOLIN HFA) 90 mcg/actuation inhaler, Inhale 2 puffs into the lungs every 4 (four) hours as needed for Wheezing or Shortness of Breath. (Patient not taking: Reported on 2/28/2023), Disp: 18 g, Rfl: 1    amitriptyline (ELAVIL) 10 MG tablet, Take 1 tablet by mouth every evening., Disp: , Rfl:     brimonidine 0.2% (ALPHAGAN) 0.2 % Drop, Place 1 drop into both eyes 2 (two) times daily., Disp: , Rfl:     fluticasone furoate-vilanteroL (BREO ELLIPTA) 100-25 mcg/dose diskus inhaler, Inhale 1 puff into the lungs daily as needed., Disp: , Rfl:     FLUTICASONE PROPIONATE NASL, 1 spray by Nasal route once daily., Disp: , Rfl:     FLUTICASONE PROPIONATE NASL, 1 spray by Each Nostril route daily as needed., Disp: , Rfl:     latanoprost 0.005 % ophthalmic solution, Place 1 drop into both eyes once daily., Disp: , Rfl:     losartan (COZAAR) 100 MG tablet, Take 1 tablet (100 mg total) by mouth once daily., Disp: 90 tablet, Rfl: 5    meloxicam (MOBIC) 7.5 MG tablet, Take 1 tablet (7.5 mg total) by mouth once daily. (Patient not taking: Reported on 2/28/2023), Disp: 30 tablet, Rfl: 1    metoprolol tartrate (LOPRESSOR) 25 MG tablet, Take 1 tablet (25 mg total) by mouth 3 (three) times daily., Disp: 90 tablet, Rfl: 5    multivitamin/iron/folic acid (CENTRUM ULTRA WOMEN'S ORAL), Take 1 tablet by mouth once daily., Disp: , Rfl:     pregabalin (LYRICA) 50 MG capsule, Take 1 capsule by mouth 3 (three) times daily., Disp: , Rfl:     triamcinolone acetonide 0.1% (KENALOG) 0.1 % cream, Apply topically 2 (two) times daily. Apply to affected area twice a day as needed, Disp: 454 g, Rfl: 1  Medications have been reviewed and reconciled.     PHYSICAL EXAM:  /68 (BP Location: Left arm, Patient Position: Sitting)   Pulse 75   Ht 5' 7" (1.702 m)   Wt 93.9 kg (207 lb)   SpO2 96%   BMI 32.42 kg/m²   Wt Readings from " Last 3 Encounters:   02/28/23 93.9 kg (207 lb)   01/09/23 98 kg (216 lb)   11/02/22 95.5 kg (210 lb 8 oz)      Body mass index is 32.42 kg/m².    Physical Exam  Vitals and nursing note reviewed.   Constitutional:       Appearance: Normal appearance. She is obese.      Comments: In a wheelchair   HENT:      Head: Normocephalic and atraumatic.   Eyes:      Pupils: Pupils are equal, round, and reactive to light.   Neck:      Vascular: No carotid bruit.   Cardiovascular:      Rate and Rhythm: Normal rate and regular rhythm.      Pulses: Normal pulses.      Heart sounds: Normal heart sounds.   Pulmonary:      Effort: Pulmonary effort is normal.      Breath sounds: Normal breath sounds.   Abdominal:      General: Bowel sounds are normal.      Palpations: Abdomen is soft.   Musculoskeletal:      Cervical back: Neck supple.      Right lower leg: No edema.      Left lower leg: No edema.   Skin:     General: Skin is warm and dry.      Capillary Refill: Capillary refill takes less than 2 seconds.   Neurological:      General: No focal deficit present.      Mental Status: She is alert and oriented to person, place, and time.   Psychiatric:         Mood and Affect: Mood normal.         Behavior: Behavior normal.       LABS REVIEWED:  Lab Results   Component Value Date    WBC 11.00 09/21/2022    RBC 4.42 09/21/2022    HGB 10.7 (L) 09/21/2022    HCT 33.8 (L) 09/21/2022    MCV 76.5 (L) 09/21/2022    MCH 24.2 (L) 09/21/2022    MCHC 31.7 (L) 09/21/2022    RDW 14.5 09/21/2022     09/21/2022    MPV 11.9 09/21/2022    NRBC 0.0 09/21/2022    INR 1.05 08/04/2021     Lab Results   Component Value Date     09/21/2022    K 4.8 09/21/2022     09/21/2022    CO2 33 (H) 09/21/2022    BUN 21 (H) 09/21/2022    MG 1.8 08/03/2021     Lab Results   Component Value Date    CPK 66 07/15/2020    AST 16 09/21/2022    ALT 21 09/21/2022     Lab Results   Component Value Date    GLU 91 09/21/2022    HGBA1C 7.0 (H) 09/21/2022     Lab  Results   Component Value Date    CHOL 165 01/09/2023    HDL 66 (H) 01/09/2023    TRIG 92 01/09/2023    CHOLHDL 2.5 01/09/2023       CARDIAC STUDIES REVIEWED:EKG:   Results for orders placed or performed in visit on 02/28/23   EKG 12-lead    Collection Time: 02/28/23  3:01 PM    Narrative    Test Reason : I25.10,    Vent. Rate : 078 BPM     Atrial Rate : 078 BPM     P-R Int : 164 ms          QRS Dur : 154 ms      QT Int : 420 ms       P-R-T Axes : 012 -44 157 degrees     QTc Int : 478 ms    Sinus rhythm with Premature supraventricular complexes  Left axis deviation  LVH with QRS widening and repolarization abnormality ( R in aVL , Elizabeth  product , [employer] )  Lateral infarct (cited on or before 04-AUG-2021)  Inferior infarct ,age undetermined  Abnormal ECG  When compared with ECG of 26-APR-2022 13:21,  Premature supraventricular complexes are now Present  Inferior infarct is now Present  Questionable change in initial forces of Lateral leads  T wave inversion more evident in Lateral leads  Confirmed by Gene Aguayo MD ([id number]) on 3/1/2023 9:10:07 AM    Referred By:             Confirmed By:Gene Aguayo MD      4/26/2022 RSR with HR 80 bpm; LVH with repolarization abnormality;    echocardiogram  Results for orders placed during the hospital encounter of 08/03/21    Echo    Interpretation Summary  · The left ventricle is normal in size with moderately decreased systolic function.  · The estimated ejection fraction is 30%.  · Grade I left ventricular diastolic dysfunction.  · Mild right ventricular enlargement with normal right ventricular systolic function.  · Normal central venous pressure (3 mmHg).  · Mild mitral regurgitation.     Heart cath 7/17/2020  Dr. Perez  LAD: Prox LAD 90% stenosis with ABISAI III flow  LCX: mid Lcx- 40% with ABISAI III flow  RCA: distal RCA 35% stenosis  LVEF 35%  Recommendations: optimize medical management     7/2004 Dr. Leena TRIVEDI top the LAD and SVG to the OM1          ASSESSMENT:   Patient Active Problem List   Diagnosis    COPD (chronic obstructive pulmonary disease)    HHD (hypertensive heart disease)    MI (myocardial infarction)    Coronary artery disease    Cardiomyopathy    Pulmonary hypertension    Rheumatoid arthritis    S/P 2-vessel coronary artery bypass    COVID-19 virus infection    Acute on chronic systolic congestive heart failure    Essential hypertension    Diabetes mellitus, type 2    COVID-19    Venous stasis ulcer    PVD (peripheral vascular disease)    Venous insufficiency of both lower extremities    Venous ulcer of right leg    Non-pressure chronic ulcer of other part of right lower leg with fat layer exposed    Bilateral lower extremity edema    Idiopathic progressive polyneuropathy    Arthritis of both knees    Polymyalgia rheumatica    Restrictive lung disease            Problem List Items Addressed This Visit          Cardiac/Vascular    Coronary artery disease - Primary    Overview     CABG 2004         Relevant Medications    metoprolol tartrate (LOPRESSOR) 25 MG tablet    Other Relevant Orders    EKG 12-lead (Completed)     Other Visit Diagnoses       Congestive heart failure, unspecified HF chronicity, unspecified heart failure type        Relevant Medications    losartan (COZAAR) 100 MG tablet    Other Relevant Orders    Echo             PLAN:   Orders Placed This Encounter   Procedures    EKG 12-lead     Standing Status:   Future     Number of Occurrences:   1     Standing Expiration Date:   2/28/2024    Echo     Standing Status:   Future     Standing Expiration Date:   2/28/2024     Scheduling Instructions:      Schedule on 3/14 or 3/28 after lunch     Order Specific Question:   Physician to read study:     Answer:   TING MARTINEZ [[id number]]     Order Specific Question:   Release to patient     Answer:   Immediate      RTC: 6 months

## 2023-03-06 ENCOUNTER — OFFICE VISIT (OUTPATIENT)
Dept: WOUND CARE | Facility: CLINIC | Age: 78
End: 2023-03-06
Attending: FAMILY MEDICINE
Payer: COMMERCIAL

## 2023-03-06 VITALS
HEART RATE: 72 BPM | DIASTOLIC BLOOD PRESSURE: 69 MMHG | SYSTOLIC BLOOD PRESSURE: 140 MMHG | TEMPERATURE: 97 F | RESPIRATION RATE: 20 BRPM

## 2023-03-06 DIAGNOSIS — I83.019 VENOUS ULCER OF RIGHT LEG: ICD-10-CM

## 2023-03-06 DIAGNOSIS — L97.919 VENOUS ULCER OF RIGHT LEG: ICD-10-CM

## 2023-03-06 PROCEDURE — 3078F PR MOST RECENT DIASTOLIC BLOOD PRESSURE < 80 MM HG: ICD-10-PCS | Mod: CPTII,,, | Performed by: NURSE PRACTITIONER

## 2023-03-06 PROCEDURE — 99214 PR OFFICE/OUTPT VISIT, EST, LEVL IV, 30-39 MIN: ICD-10-PCS | Mod: S$PBB,,, | Performed by: NURSE PRACTITIONER

## 2023-03-06 PROCEDURE — 99214 OFFICE O/P EST MOD 30 MIN: CPT | Mod: S$PBB,,, | Performed by: NURSE PRACTITIONER

## 2023-03-06 PROCEDURE — 3077F PR MOST RECENT SYSTOLIC BLOOD PRESSURE >= 140 MM HG: ICD-10-PCS | Mod: CPTII,,, | Performed by: NURSE PRACTITIONER

## 2023-03-06 PROCEDURE — 1126F PR PAIN SEVERITY QUANTIFIED, NO PAIN PRESENT: ICD-10-PCS | Mod: CPTII,,, | Performed by: NURSE PRACTITIONER

## 2023-03-06 PROCEDURE — 3078F DIAST BP <80 MM HG: CPT | Mod: CPTII,,, | Performed by: NURSE PRACTITIONER

## 2023-03-06 PROCEDURE — 29581 APPL MULTLAYER CMPRN SYS LEG: CPT | Mod: RT

## 2023-03-06 PROCEDURE — 99215 OFFICE O/P EST HI 40 MIN: CPT | Mod: PBBFAC | Performed by: NURSE PRACTITIONER

## 2023-03-06 PROCEDURE — 1160F RVW MEDS BY RX/DR IN RCRD: CPT | Mod: CPTII,,, | Performed by: NURSE PRACTITIONER

## 2023-03-06 PROCEDURE — 3077F SYST BP >= 140 MM HG: CPT | Mod: CPTII,,, | Performed by: NURSE PRACTITIONER

## 2023-03-06 PROCEDURE — 1159F MED LIST DOCD IN RCRD: CPT | Mod: CPTII,,, | Performed by: NURSE PRACTITIONER

## 2023-03-06 PROCEDURE — 1126F AMNT PAIN NOTED NONE PRSNT: CPT | Mod: CPTII,,, | Performed by: NURSE PRACTITIONER

## 2023-03-06 PROCEDURE — 1159F PR MEDICATION LIST DOCUMENTED IN MEDICAL RECORD: ICD-10-PCS | Mod: CPTII,,, | Performed by: NURSE PRACTITIONER

## 2023-03-06 PROCEDURE — 1160F PR REVIEW ALL MEDS BY PRESCRIBER/CLIN PHARMACIST DOCUMENTED: ICD-10-PCS | Mod: CPTII,,, | Performed by: NURSE PRACTITIONER

## 2023-03-06 RX ORDER — TRIAMCINOLONE ACETONIDE 0.25 MG/G
CREAM TOPICAL 2 TIMES DAILY
Qty: 454 G | Refills: 2 | Status: SHIPPED | OUTPATIENT
Start: 2023-03-06 | End: 2023-06-19 | Stop reason: SDUPTHER

## 2023-03-06 RX ORDER — AMOXICILLIN 500 MG/1
500 TABLET, FILM COATED ORAL EVERY 12 HOURS
Qty: 60 TABLET | Refills: 0 | Status: SHIPPED | OUTPATIENT
Start: 2023-03-06 | End: 2023-03-27 | Stop reason: SDUPTHER

## 2023-03-06 NOTE — ASSESSMENT & PLAN NOTE
Clean with baby shampoo and water  Apply vashe moisten drawtex then apply optifoam gentle over drawtex then wrap with kerlix and ace wrap or sure press from toes to just below knee  Change daily and as needed  Apply large edema wear to left leg  Elevate legs as much as possible  Avoid prolonged standing  Monitor closely for s/s of infection including fever, chills, increase in pain, odor from wound, and increased redness from foot. Go to ER if any complications develop.   Keep leg elevated and avoid pressure on wound.  Diabetes:  Monitor glucose closely. Check fasting glucose and 2 hours after meals. HgA1C goal <7, fasting glucose , and 2 hours after meals <180  Hypertension:  Check blood pressure twice daily, goal <120/80  Diet:   Increase protein intake, avoid fried, fatty foods and foods high in simple carbs.   Vitamins:  Take vitamin C 1000 mg, zinc 50mg, vitamin d 5000 units, and a daily multivitamin. Ernie is a good source of protein and nutrients to aid in wound healing.

## 2023-03-06 NOTE — PROGRESS NOTES
MYA Escobar   [facility]  [address] [facility] [place] [address]  [phone]      PATIENT NAME: Delma Rae  : 1945  DATE: 3/6/23  MRN: [id number]      Billing Provider: MYA Escobar  Level of Service:   Patient PCP Information       Provider PCP Type    Fco Chaparro MD General            Reason for Visit / Chief Complaint: Non-healing Wound Follow Up (RLE)       History of Present Illness / Problem Focused Workflow     Delma Rae is a 76 y.o. female who presents to clinic for follow up on chronic-non healing wound on the right lower leg. Wounds are worse today since she has completed steroid taper. Start amoxicillin and kenaolog to lower extremity, use drawtex and compression. Discuss with Rheumatology for additional topical/systemic treatment options.     Arterial doppler 2022, No areas of occlusion. No doubling of peak systolic velocity between adjacent stations to specifically suggest high-grade stenosis of a focal nature. Ankle brachial indices measure normal at 1.27 right and 1.19 left.     Significant PMH of CHF, COPD, hypertension, MI, rheumatoid arthritis, hyperlipidemia, GERD, and diabetes. Last HgA1C 7 in , diabetes managed by PCP.  Pertinent factors that delay wound healing include multiple co-morbidities, poor vascular supply, diabetes, edema, heavy drainage, excessive wound moisture and macerated tissue, large surface area, advanced age and fragile skin. Denies fever and chills.      Review of Systems     Review of Systems   Constitutional:  Positive for activity change. Negative for chills and fever.   Respiratory:  Negative for chest tightness and shortness of breath.    Cardiovascular:  Positive for leg swelling. Negative for chest pain and palpitations.   Musculoskeletal:  Positive for arthralgias and joint swelling.   Skin:  Positive for wound.        See wound assessment    Neurological:  Positive for weakness and numbness.   Psychiatric/Behavioral:  Negative for agitation, behavioral problems, confusion and self-injury.      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 Chaidez    GERD (gastroesophageal reflux disease)     HHD (hypertensive heart disease)     Hyperlipidemia     Hypertension     MI (myocardial infarction)     OA (osteoarthritis)     Rheumatoid arthritis        Past Surgical History:   Procedure Laterality Date    HYSTERECTOMY      OOPHORECTOMY      VAGINAL DELIVERY      x 1       Social History  Ms. Delma Rae  reports that she has never smoked. She has never used smokeless tobacco. She reports that she does not drink alcohol and does not use drugs.    Family History  Ms.'s Delma Rae family history includes Cancer in her brother and father; Coronary artery disease in her brother and sister; Diabetes in her father and mother; Hypertension in her mother and son; Prostate cancer in her father.    Medications and Allergies     Medications  Outpatient Medications Marked as Taking for the 3/6/23 encounter (Office Visit) with MYA Escobar   Medication Sig Dispense Refill    amitriptyline (ELAVIL) 10 MG tablet Take 1 tablet by mouth every evening.      aspirin (ECOTRIN) 81 MG EC tablet Take 81 mg by mouth once daily.      atorvastatin (LIPITOR) 40 MG tablet Take 1 tablet (40 mg total) by mouth once daily. 90 tablet 1    blood sugar diagnostic (ONETOUCH ULTRA BLUE TEST STRIP) Strp 1 strip by Other route 4 (four) times daily before meals and nightly. Dx e11.9 200 strip 5    brimonidine 0.2% (ALPHAGAN) 0.2 % Drop  "Place 1 drop into both eyes 2 (two) times daily.      ferrous sulfate 325 (65 FE) MG EC tablet Take 1 tablet (325 mg total) by mouth once daily. 90 tablet 1    fluticasone furoate-vilanteroL (BREO ELLIPTA) 100-25 mcg/dose diskus inhaler Inhale 1 puff into the lungs daily as needed.      FLUTICASONE PROPIONATE NASL 1 spray by Nasal route once daily.      FLUTICASONE PROPIONATE NASL 1 spray by Each Nostril route daily as needed.      folic acid (FOLVITE) 1 MG tablet Take 1 mg by mouth once daily.      furosemide (LASIX) 40 MG tablet Take 1 tablet (40 mg total) by mouth 2 (two) times a day. 60 tablet 1    hydrALAZINE (APRESOLINE) 25 MG tablet TAKE ONE TABLET BY MOUTH THREE TIMES DAILY 270 tablet 1    hydrOXYchloroQUINE (PLAQUENIL) 200 mg tablet Take 1 tablet (200 mg total) by mouth once daily. (Patient taking differently: Take 200 mg by mouth 2 (two) times daily.) 90 tablet 1    insulin NPH-insulin regular, 70/30, (NOVOLIN 70/30 U-100 INSULIN) 100 unit/mL (70-30) injection INJECT 40 UNITS UNDER SKIN IN THE MORNING AND 15 IN THE EVENING. 50 mL 6    insulin syringe-needle U-100 1 mL 31 gauge x 5/16 Syrg Inject 1 each into the skin 2 (two) times a day. Dx e11.9 200 each 3    insulin syringe-needle U-100 1/2 mL 31 gauge x 15/64" Syrg by Misc.(Non-Drug; Combo Route) route. Use as directed with insulin      lancets Misc 1 each by skin prick route 4 (four) times daily before meals and nightly. 200 each 5    latanoprost 0.005 % ophthalmic solution Place 1 drop into both eyes once daily.      loratadine (CLARITIN) 10 mg tablet Take 1 tablet (10 mg total) by mouth once daily. 90 tablet 1    losartan (COZAAR) 100 MG tablet Take 1 tablet (100 mg total) by mouth once daily. 90 tablet 5    metoprolol tartrate (LOPRESSOR) 25 MG tablet Take 1 tablet (25 mg total) by mouth 3 (three) times daily. 90 tablet 5    multivit-min-FA-lycopen-lutein (CENTRUM SILVER) 0.4 mg-300 mcg- 250 mcg Tab Take 1 tablet by mouth once daily. "      multivitamin/iron/folic acid (CENTRUM ULTRA WOMEN'S ORAL) Take 1 tablet by mouth once daily.      naproxen (NAPROSYN) 500 MG tablet Take 1 tablet (500 mg total) by mouth 2 (two) times daily as needed. 20 tablet 1    omeprazole (PRILOSEC) 20 MG capsule Take 1 capsule (20 mg total) by mouth once daily. 90 capsule 1    potassium chloride SA (K-DUR,KLOR-CON M) 10 MEQ tablet Take 1 tablet (10 mEq total) by mouth 2 (two) times daily. 60 tablet 3    predniSONE (DELTASONE) 2.5 MG tablet Take 2.5 mg by mouth once daily at 6am.      pregabalin (LYRICA) 50 MG capsule Take 1 capsule by mouth 3 (three) times daily.      torsemide (DEMADEX) 20 MG Tab Take 2 tablets (40 mg total) by mouth every morning. 180 tablet 1    triamcinolone acetonide 0.1% (KENALOG) 0.1 % cream Apply topically 2 (two) times daily. Apply to affected area twice a day as needed 454 g 1    venlafaxine (EFFEXOR-XR) 75 MG 24 hr capsule Take 1 capsule (75 mg total) by mouth every other day. 45 capsule 1    zolpidem (AMBIEN) 5 MG Tab Take 1 tablet (5 mg total) by mouth nightly as needed. 1 tablet 0       Allergies  Review of patient's allergies indicates:  No Known Allergies    Physical Examination     Vitals:    03/06/23 1322   BP: (!) 140/69   Pulse: 72   Resp:    Temp: 97 °F (36.1 °C)     Physical Exam  Vitals and nursing note reviewed.   Constitutional:       Appearance: Normal appearance.   HENT:      Head: Normocephalic.   Cardiovascular:      Rate and Rhythm: Normal rate and regular rhythm.      Pulses: Normal pulses.      Heart sounds: Normal heart sounds.   Pulmonary:      Effort: Pulmonary effort is normal. No respiratory distress.   Chest:      Chest wall: No tenderness.   Musculoskeletal:         General: Swelling present.      Right lower leg: Edema present.      Left lower leg: Edema present.   Skin:     General: Skin is warm and dry.      Capillary Refill: Capillary refill takes 2 to 3 seconds.      Findings: Erythema present.       Comments: See LDA for photo and measurements   Neurological:      General: No focal deficit present.      Mental Status: She is alert and oriented to person, place, and time. Mental status is at baseline.   Psychiatric:         Mood and Affect: Mood normal.         Behavior: Behavior normal.         Thought Content: Thought content normal.         Judgment: Judgment normal.     Assessment and Plan             Altered Skin Integrity 11/03/22 Right posterior Calf #2 Venous Ulcer Full thickness tissue loss. Subcutaneous fat may be visible but bone, tendon or muscle are not exposed (Active)   11/03/22    Altered Skin Integrity Present on Admission - Did Patient arrive to the hospital with altered skin?: yes   Side: Right   Orientation: posterior   Location: Calf   Wound Number: #2   Is this injury device related?: No   Primary Wound Type: Venous ulcer   Description of Altered Skin Integrity: Full thickness tissue loss. Subcutaneous fat may be visible but bone, tendon or muscle are not exposed   Ankle-Brachial Index:    Pulses:    Removal Indication and Assessment:    Wound Outcome:    (Retired) Wound Length (cm):    (Retired) Wound Width (cm):    (Retired) Depth (cm):    Wound Description (Comments):    Removal Indications:    Wound Image    03/06/23 1343   Description of Altered Skin Integrity Full thickness tissue loss. Subcutaneous fat may be visible but bone, tendon or muscle are not exposed 03/06/23 1343   Dressing Appearance Moist drainage 03/06/23 1343   Drainage Amount Moderate 03/06/23 1343   Drainage Characteristics/Odor Serosanguineous 03/06/23 1343   Appearance Pink;Moist;Granulating;White;Fibrin 03/06/23 1343   Tissue loss description Full thickness 03/06/23 1343   Black (%), Wound Tissue Color 0 % 03/06/23 1343   Red (%), Wound Tissue Color 100 % 03/06/23 1343   Yellow (%), Wound Tissue Color 0 % 03/06/23 1343   Periwound Area Intact 03/06/23 1343   Wound Edges Open 03/06/23 1343   Wound Length (cm) 3.5 cm  03/06/23 1343   Wound Width (cm) 4.6 cm 03/06/23 1343   Wound Depth (cm) 0.3 cm 03/06/23 1343   Wound Volume (cm^3) 4.83 cm^3 03/06/23 1343   Wound Surface Area (cm^2) 16.1 cm^2 03/06/23 1343   Care Cleansed with:;Antimicrobial agent 03/06/23 1343   Dressing Applied;Calcium alginate;Silver;Silicone;Gauze;Rolled gauze;Compression wrap 03/06/23 1343   Periwound Care Moisture barrier applied 03/06/23 1343   Compression Two layer compression 03/06/23 1343   Dressing Change Due 03/07/23 03/06/23 1343            Altered Skin Integrity 01/09/23 1328 Right anterior;lower;proximal Leg (Active)   01/09/23 1328   Altered Skin Integrity Present on Admission - Did Patient arrive to the hospital with altered skin?:    Side: Right   Orientation: anterior;lower;proximal   Location: Leg   Wound Number:    Is this injury device related?:    Primary Wound Type:    Description of Altered Skin Integrity:    Ankle-Brachial Index:    Pulses:    Removal Indication and Assessment:    Wound Outcome:    (Retired) Wound Length (cm):    (Retired) Wound Width (cm):    (Retired) Depth (cm):    Wound Description (Comments):    Removal Indications:    Wound Image    03/06/23 1333   Description of Altered Skin Integrity Full thickness tissue loss. Subcutaneous fat may be visible but bone, tendon or muscle are not exposed 03/06/23 1333   Dressing Appearance Moist drainage 03/06/23 1333   Drainage Amount Moderate 03/06/23 1333   Drainage Characteristics/Odor Serosanguineous 03/06/23 1333   Appearance Pink;Moist;Granulating 03/06/23 1333   Tissue loss description Full thickness 03/06/23 1333   Black (%), Wound Tissue Color 0 % 03/06/23 1333   Red (%), Wound Tissue Color 100 % 03/06/23 1333   Yellow (%), Wound Tissue Color 0 % 03/06/23 1333   Periwound Area Intact 03/06/23 1333   Wound Edges Irregular 03/06/23 1333   Wound Length (cm) 2.8 cm 03/06/23 1331   Wound Width (cm) 2 cm 03/06/23 1331   Wound Depth (cm) 0.2 cm 03/06/23 1331   Wound Volume (cm^3)  1.12 cm^3 03/06/23 1331   Wound Surface Area (cm^2) 5.6 cm^2 03/06/23 1331   Care Cleansed with:;Antimicrobial agent 03/06/23 1331   Dressing Applied;Calcium alginate;Silver;Silicone;Gauze;Rolled gauze;Compression wrap 03/06/23 1331   Periwound Care Moisture barrier applied 03/06/23 1331   Compression Two layer compression 03/06/23 1331   Dressing Change Due 03/07/23 03/06/23 1331            Altered Skin Integrity 03/06/23 Right anterior Foot #4 Blister(s) Partial thickness tissue loss. Shallow open ulcer with a red or pink wound bed, without slough. Intact or Open/Ruptured Serum-filled blister. (Active)   03/06/23    Altered Skin Integrity Present on Admission - Did Patient arrive to the hospital with altered skin?:    Side: Right   Orientation: anterior   Location: Foot   Wound Number: #4   Is this injury device related?: No   Primary Wound Type: Blister(s)   Description of Altered Skin Integrity: Partial thickness tissue loss. Shallow open ulcer with a red or pink wound bed, without slough. Intact or Open/Ruptured Serum-filled blister.   Ankle-Brachial Index:    Pulses:    Removal Indication and Assessment:    Wound Outcome:    (Retired) Wound Length (cm):    (Retired) Wound Width (cm):    (Retired) Depth (cm):    Wound Description (Comments):    Removal Indications:    Wound Image    03/06/23 1349   Description of Altered Skin Integrity Intact skin with non-blanchable redness of localized area 03/06/23 1349   Dressing Appearance Dry;Intact;Clean 03/06/23 1349   Drainage Amount None 03/06/23 1349   Appearance Maroon 03/06/23 1349   Tissue loss description Not applicable 03/06/23 1349   Black (%), Wound Tissue Color 0 % 03/06/23 1349   Red (%), Wound Tissue Color 100 % 03/06/23 1349   Yellow (%), Wound Tissue Color 0 % 03/06/23 1349   Periwound Area Intact 03/06/23 1349   Wound Edges Irregular;Rolled/closed 03/06/23 1349   Wound Length (cm) 5 cm 03/06/23 1349   Wound Width (cm) 1.7 cm 03/06/23 1349   Wound Depth  (cm) 0.1 cm 03/06/23 1349   Wound Volume (cm^3) 0.85 cm^3 03/06/23 1349   Wound Surface Area (cm^2) 8.5 cm^2 03/06/23 1349   Care Cleansed with:;Antimicrobial agent 03/06/23 1349   Dressing Applied;Gauze;Rolled gauze 03/06/23 1349   Periwound Care Moisturizer applied 03/06/23 1349   Compression Two layer compression 03/06/23 1349   Dressing Change Due 03/07/23 03/06/23 1349            Wound 09/27/21 Right lower;lateral Leg #3 (Active)   09/27/21     Pre-existing:    Primary Wound Type:    Side: Right   Orientation: lower;lateral   Location: Leg   Wound Number: #3   Ankle-Brachial Index:    Pulses:    Removal Indication and Assessment:    Wound Outcome:    (Retired) Wound Type:    (Retired) Wound Length (cm):    (Retired) Wound Width (cm):    (Retired) Depth (cm):    Wound Description (Comments):    Removal Indications:    Wound Image    03/06/23 1339   Dressing Appearance Moist drainage 03/06/23 1339   Drainage Amount Moderate 03/06/23 1339   Drainage Characteristics/Odor Serosanguineous 03/06/23 1339   Appearance Pink;Moist;Granulating 03/06/23 1339   Tissue loss description Full thickness 03/06/23 1339   Black (%), Wound Tissue Color 0 % 03/06/23 1339   Red (%), Wound Tissue Color 100 % 03/06/23 1339   Yellow (%), Wound Tissue Color 0 % 03/06/23 1339   Periwound Area Intact 03/06/23 1339   Wound Edges Irregular 03/06/23 1339   Wound Length (cm) 4 cm 03/06/23 1339   Wound Width (cm) 6 cm 03/06/23 1339   Wound Depth (cm) 0.2 cm 03/06/23 1339   Wound Volume (cm^3) 4.8 cm^3 03/06/23 1339   Wound Surface Area (cm^2) 24 cm^2 03/06/23 1339   Care Cleansed with:;Antimicrobial agent 03/06/23 1339   Dressing Applied;Calcium alginate;Silver;Silicone;Gauze;Rolled gauze 03/06/23 1339   Periwound Care Moisturizer applied 03/06/23 1339   Compression Two layer compression 03/06/23 1339   Dressing Change Due 03/07/23 03/06/23 1339     Problem List Items Addressed This Visit          Orthopedic    Venous ulcer of right leg     Overview                                      Current Assessment & Plan     Clean with baby shampoo and water  Apply vashe moisten drawtex then apply optifoam gentle over drawtex then wrap with kerlix and ace wrap or sure press from toes to just below knee  Change daily and as needed  Apply large edema wear to left leg  Elevate legs as much as possible  Avoid prolonged standing  Monitor closely for s/s of infection including fever, chills, increase in pain, odor from wound, and increased redness from foot. Go to ER if any complications develop.   Keep leg elevated and avoid pressure on wound.  Diabetes:  Monitor glucose closely. Check fasting glucose and 2 hours after meals. HgA1C goal <7, fasting glucose , and 2 hours after meals <180  Hypertension:  Check blood pressure twice daily, goal <120/80  Diet:   Increase protein intake, avoid fried, fatty foods and foods high in simple carbs.   Vitamins:  Take vitamin C 1000 mg, zinc 50mg, vitamin d 5000 units, and a daily multivitamin. Ernie is a good source of protein and nutrients to aid in wound healing.             Future Appointments   Date Time Provider Department Center   3/28/2023  1:00 PM MYA Escobar [facility]    5/29/2023 12:30 PM CRISTINA Smith OB [facility]   1/12/2024  3:00 PM AWV NURSE, [facility] SHELDON Simental            Signature:  MYA Escobar  [facility]  [address] [facility] [place] [address]  [phone]    Date of encounter: 3/6/23

## 2023-03-06 NOTE — PATIENT INSTRUCTIONS
Clean with baby shampoo and water  Apply kenalog cream to wounds,cover with mepitel and dry gauze  then wrap with kerlix and ace wrap or sure press from toes to just below knee lightly  Change daily and as needed  Apply large edema wear to left leg  Elevate legs as much as possible  Avoid prolonged standing  Monitor closely for s/s of infection including fever, chills, increase in pain, odor from wound, and increased redness from foot. Go to ER if any complications develop.   Keep leg elevated and avoid pressure on wound.  Diabetes:  Monitor glucose closely. Check fasting glucose and 2 hours after meals. HgA1C goal <7, fasting glucose , and 2 hours after meals <180  Hypertension:  Check blood pressure twice daily, goal <120/80  Diet:   Increase protein intake, avoid fried, fatty foods and foods high in simple carbs.   Vitamins:  Take vitamin C 1000 mg, zinc 50mg, vitamin d 5000 units, and a daily multivitamin. Ernie is a good source of protein and nutrients to aid in wound healing.   Start Amoxicillin as prescribed

## 2023-03-14 RX ORDER — POTASSIUM CHLORIDE 750 MG/1
10 TABLET, EXTENDED RELEASE ORAL 2 TIMES DAILY
Qty: 60 TABLET | Refills: 3 | Status: SHIPPED | OUTPATIENT
Start: 2023-03-14 | End: 2023-07-19 | Stop reason: SDUPTHER

## 2023-03-14 RX ORDER — VENLAFAXINE HYDROCHLORIDE 75 MG/1
75 CAPSULE, EXTENDED RELEASE ORAL EVERY OTHER DAY
Qty: 45 CAPSULE | Refills: 1 | Status: SHIPPED | OUTPATIENT
Start: 2023-03-14 | End: 2023-11-02 | Stop reason: SDUPTHER

## 2023-03-14 NOTE — TELEPHONE ENCOUNTER
----- Message from Marita Gallagher sent at 3/13/2023  4:30 PM CDT -----  Regarding: MED REFILL  VANLAFAXINE   POT CHLORIDE  Preferred Pharmacy    [id number] - [place], [place] - [address]          [phone]

## 2023-04-10 NOTE — PATIENT INSTRUCTIONS
Clean with baby shampoo and water  Apply kenalog cream to wounds,cover with mepitel and dry gauze  then wrap with kerlix and ace wrap or sure press from toes to just below knee lightly  Change daily and as needed  Apply large edema wear to left leg  Elevate legs as much as possible  Avoid prolonged standing  Monitor closely for s/s of infection including fever, chills, increase in pain, odor from wound, and increased redness from foot. Go to ER if any complications develop.   Keep leg elevated and avoid pressure on wound.  Diabetes:  Monitor glucose closely. Check fasting glucose and 2 hours after meals. HgA1C goal <7, fasting glucose , and 2 hours after meals <180  Hypertension:  Check blood pressure twice daily, goal <120/80  Diet:   Increase protein intake, avoid fried, fatty foods and foods high in simple carbs.   Vitamins:  Take vitamin C 1000 mg, zinc 50mg, vitamin d 5000 units, and a daily multivitamin. Ernie is a good source of protein and nutrients to aid in wound healing.

## 2023-04-10 NOTE — PROGRESS NOTES
MYA Escobar   [facility]  [address] [facility] [place] [address]  [phone]      PATIENT NAME: Demla Rae  : 1945  DATE: 23  MRN: [id number]      Billing Provider: MYA Escobar  Level of Service:   Patient PCP Information       Provider PCP Type    Fco Chaparro MD General            Reason for Visit / Chief Complaint: Venous ulcer of right leg       History of Present Illness / Problem Focused Workflow     Delma Rae is a 76 y.o. female who presents to clinic for follow up on chronic-non healing wound on the right lower leg. Wounds have improved since last visit. She is followed by Rheumatology. Recommended patient and son discuss recurrent wounds and positive steroid response with Rheumatology for additional topical/systemic treatment options.     Arterial doppler 2022, No areas of occlusion. No doubling of peak systolic velocity between adjacent stations to specifically suggest high-grade stenosis of a focal nature. Ankle brachial indices measure normal at 1.27 right and 1.19 left.     Significant PMH of CHF, COPD, hypertension, MI, rheumatoid arthritis, hyperlipidemia, GERD, and diabetes. Last HgA1C 7 in , diabetes managed by PCP.  Pertinent factors that delay wound healing include multiple co-morbidities, poor vascular supply, diabetes, edema, heavy drainage, excessive wound moisture and macerated tissue, large surface area, advanced age and fragile skin. Denies fever and chills.      Review of Systems     Review of Systems   Constitutional:  Positive for activity change. Negative for chills and fever.   Respiratory:  Negative for chest tightness and shortness of breath.    Cardiovascular:  Positive for leg swelling. Negative for chest pain and palpitations.   Musculoskeletal:  Positive for arthralgias and joint swelling.   Skin:  Positive for wound.        See wound assessment    Neurological:  Positive for weakness and numbness.   Psychiatric/Behavioral:  Negative for agitation, behavioral problems, confusion and self-injury.      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 Chaidez    GERD (gastroesophageal reflux disease)     HHD (hypertensive heart disease)     Hyperlipidemia     Hypertension     MI (myocardial infarction)     OA (osteoarthritis)     Rheumatoid arthritis        Past Surgical History:   Procedure Laterality Date    HYSTERECTOMY      OOPHORECTOMY      VAGINAL DELIVERY      x 1       Social History  Ms. Delma Rae  reports that she has never smoked. She has never used smokeless tobacco. She reports that she does not drink alcohol and does not use drugs.    Family History  Ms.'s Delma Rae family history includes Cancer in her brother and father; Coronary artery disease in her brother and sister; Diabetes in her father and mother; Hypertension in her mother and son; Prostate cancer in her father.    Medications and Allergies     Medications  No outpatient medications have been marked as taking for the 4/11/23 encounter (Office Visit) with MYA Escobar.       Allergies  Review of patient's allergies indicates:  No Known Allergies    Physical Examination     Vitals:    04/11/23 1338   BP: (!) 150/60   Pulse: 80   Resp: 20   Temp: 98.7 °F (37.1 °C)     Physical Exam  Vitals and nursing note reviewed.   Constitutional:       Appearance: Normal appearance.   HENT:      Head: Normocephalic.   Cardiovascular:      Rate and Rhythm: Normal rate and regular rhythm.      Pulses: Normal pulses.      Heart sounds: Normal heart sounds.    Pulmonary:      Effort: Pulmonary effort is normal. No respiratory distress.   Chest:      Chest wall: No tenderness.   Musculoskeletal:         General: Swelling present.      Right lower leg: Edema present.      Left lower leg: Edema present.   Skin:     General: Skin is warm and dry.      Capillary Refill: Capillary refill takes 2 to 3 seconds.      Findings: Erythema present.      Comments: See LDA for photo and measurements   Neurological:      General: No focal deficit present.      Mental Status: She is alert and oriented to person, place, and time. Mental status is at baseline.   Psychiatric:         Mood and Affect: Mood normal.         Behavior: Behavior normal.         Thought Content: Thought content normal.         Judgment: Judgment normal.     Assessment and Plan             Altered Skin Integrity 11/03/22 Right posterior Calf #2 Venous Ulcer Full thickness tissue loss. Subcutaneous fat may be visible but bone, tendon or muscle are not exposed (Active)   11/03/22    Altered Skin Integrity Present on Admission - Did Patient arrive to the hospital with altered skin?: yes   Side: Right   Orientation: posterior   Location: Calf   Wound Number: #2   Is this injury device related?: No   Primary Wound Type: Venous ulcer   Description of Altered Skin Integrity: Full thickness tissue loss. Subcutaneous fat may be visible but bone, tendon or muscle are not exposed   Ankle-Brachial Index:    Pulses:    Removal Indication and Assessment:    Wound Outcome:    (Retired) Wound Length (cm):    (Retired) Wound Width (cm):    (Retired) Depth (cm):    Wound Description (Comments):    Removal Indications:    Wound Image   04/11/23 1347   Description of Altered Skin Integrity Partial thickness tissue loss. Shallow open ulcer with a red or pink wound bed, without slough. Intact or Open/Ruptured Serum-filled blister. 04/11/23 1347   Dressing Appearance Dried drainage 04/11/23 1347   Drainage Amount Moderate 04/11/23 1347    Drainage Characteristics/Odor Sanguineous 04/11/23 1347   Appearance Pink;Red;Dry 04/11/23 1347   Tissue loss description Partial thickness 04/11/23 1347   Black (%), Wound Tissue Color 0 % 04/11/23 1347   Red (%), Wound Tissue Color 100 % 04/11/23 1347   Yellow (%), Wound Tissue Color 0 % 04/11/23 1347   Periwound Area Dry 04/11/23 1347   Wound Edges Open 04/11/23 1347   Wound Length (cm) 3 cm 04/11/23 1347   Wound Width (cm) 3 cm 04/11/23 1347   Wound Depth (cm) 0.1 cm 04/11/23 1347   Wound Volume (cm^3) 0.9 cm^3 04/11/23 1347   Wound Surface Area (cm^2) 9 cm^2 04/11/23 1347   Care Cleansed with:;Soap and water;Applied:;Moisturizing agent 04/11/23 1347   Dressing Applied;Gauze;Cast padding;Compression wrap 04/11/23 1347   Periwound Care Moisturizer applied 04/11/23 1347   Compression Two layer compression 04/11/23 1347   Dressing Change Due 04/12/23 04/11/23 1347            Altered Skin Integrity 01/09/23 1328 Right anterior;lower;proximal Leg (Active)   01/09/23 1328   Altered Skin Integrity Present on Admission - Did Patient arrive to the hospital with altered skin?:    Side: Right   Orientation: anterior;lower;proximal   Location: Leg   Wound Number:    Is this injury device related?:    Primary Wound Type:    Description of Altered Skin Integrity:    Ankle-Brachial Index:    Pulses:    Removal Indication and Assessment:    Wound Outcome:    (Retired) Wound Length (cm):    (Retired) Wound Width (cm):    (Retired) Depth (cm):    Wound Description (Comments):    Removal Indications:    Wound Image    04/11/23 1346   Description of Altered Skin Integrity Partial thickness tissue loss. Shallow open ulcer with a red or pink wound bed, without slough. Intact or Open/Ruptured Serum-filled blister. 04/11/23 1346   Dressing Appearance Dried drainage 04/11/23 1346   Drainage Amount Moderate 04/11/23 1346   Drainage Characteristics/Odor Sanguineous;No odor 04/11/23 1346   Appearance Pink;Red;Moist 04/11/23 1346   Tissue  loss description Partial thickness 04/11/23 1346   Black (%), Wound Tissue Color 0 % 04/11/23 1346   Red (%), Wound Tissue Color 100 % 04/11/23 1346   Yellow (%), Wound Tissue Color 0 % 04/11/23 1346   Periwound Area Swelling 04/11/23 1346   Wound Edges Open 04/11/23 1346   Wound Length (cm) 1.7 cm 04/11/23 1346   Wound Width (cm) 4 cm 04/11/23 1346   Wound Depth (cm) 0.1 cm 04/11/23 1346   Wound Volume (cm^3) 0.68 cm^3 04/11/23 1346   Wound Surface Area (cm^2) 6.8 cm^2 04/11/23 1346   Care Cleansed with:;Soap and water;Applied:;Skin Barrier 04/11/23 1346   Dressing Applied;Gauze;Cast padding;Compression wrap 04/11/23 1346   Periwound Care Moisture barrier applied 04/11/23 1346   Compression Two layer compression 04/11/23 1346   Dressing Change Due 04/12/23 04/11/23 1346            Altered Skin Integrity 03/06/23 Right anterior Foot #4 Blister(s) Partial thickness tissue loss. Shallow open ulcer with a red or pink wound bed, without slough. Intact or Open/Ruptured Serum-filled blister. (Active)   03/06/23    Altered Skin Integrity Present on Admission - Did Patient arrive to the hospital with altered skin?:    Side: Right   Orientation: anterior   Location: Foot   Wound Number: #4   Is this injury device related?: No   Primary Wound Type: Blister(s)   Description of Altered Skin Integrity: Partial thickness tissue loss. Shallow open ulcer with a red or pink wound bed, without slough. Intact or Open/Ruptured Serum-filled blister.   Ankle-Brachial Index:    Pulses:    Removal Indication and Assessment:    Wound Outcome:    (Retired) Wound Length (cm):    (Retired) Wound Width (cm):    (Retired) Depth (cm):    Wound Description (Comments):    Removal Indications:    Wound Image   04/11/23 1345   Description of Altered Skin Integrity Partial thickness tissue loss. Shallow open ulcer with a red or pink wound bed, without slough. Intact or Open/Ruptured Serum-filled blister. 04/11/23 1345   Dressing Appearance Dried  drainage 04/11/23 1345   Drainage Amount Moderate 04/11/23 1345   Drainage Characteristics/Odor Sanguineous 04/11/23 1345   Appearance Pink;Eschar;Dry 04/11/23 1345   Tissue loss description Partial thickness 04/11/23 1345   Black (%), Wound Tissue Color 0 % 04/11/23 1345   Red (%), Wound Tissue Color 100 % 04/11/23 1345   Yellow (%), Wound Tissue Color 0 % 04/11/23 1345   Periwound Area Dry;Solomon 04/11/23 1345   Wound Edges Open 04/11/23 1345   Wound Length (cm) 1.2 cm 04/11/23 1345   Wound Width (cm) 0.3 cm 04/11/23 1345   Wound Depth (cm) 0.2 cm 04/11/23 1345   Wound Volume (cm^3) 0.072 cm^3 04/11/23 1345   Wound Surface Area (cm^2) 0.36 cm^2 04/11/23 1345   Care Cleansed with:;Soap and water;Applied:;Moisturizing agent 04/11/23 1345   Dressing Applied;Gauze;Rolled gauze;Cast padding;Compression wrap 04/11/23 1345   Periwound Care Moisturizer applied 04/11/23 1345   Compression Two layer compression 04/11/23 1345   Dressing Change Due 04/12/23 04/11/23 1345            Altered Skin Integrity 04/11/23 1348 Right lateral Foot (Active)   04/11/23 1348   Altered Skin Integrity Present on Admission - Did Patient arrive to the hospital with altered skin?:    Side: Right   Orientation: lateral   Location: Foot   Wound Number:    Is this injury device related?:    Primary Wound Type:    Description of Altered Skin Integrity:    Ankle-Brachial Index:    Pulses:    Removal Indication and Assessment:    Wound Outcome:    (Retired) Wound Length (cm):    (Retired) Wound Width (cm):    (Retired) Depth (cm):    Wound Description (Comments):    Removal Indications:    Wound Image    04/11/23 1348   Dressing Appearance Dry;Intact;Clean 04/11/23 1348   Drainage Amount None 04/11/23 1348   Appearance Black 04/11/23 1348   Tissue loss description Full thickness 04/11/23 1348   Black (%), Wound Tissue Color 100 % 04/11/23 1348   Red (%), Wound Tissue Color 0 % 04/11/23 1348   Yellow (%), Wound Tissue Color 0 % 04/11/23 1348   Periwound  Area Dry 04/11/23 1348   Wound Edges Rolled/closed;Callused 04/11/23 1348   Wound Length (cm) 1.1 cm 04/11/23 1348   Wound Width (cm) 1.5 cm 04/11/23 1348   Wound Depth (cm) 0.3 cm 04/11/23 1348   Wound Volume (cm^3) 0.495 cm^3 04/11/23 1348   Wound Surface Area (cm^2) 1.65 cm^2 04/11/23 1348   Care Cleansed with:;Soap and water;Applied:;Moisturizing agent 04/11/23 1348   Dressing Applied;Gauze;Rolled gauze 04/11/23 1348   Periwound Care Moisturizer applied 04/11/23 1348   Dressing Change Due 04/12/23 04/11/23 1348            Wound 09/27/21 Right lower;lateral Leg #3 (Active)   09/27/21     Pre-existing:    Primary Wound Type:    Side: Right   Orientation: lower;lateral   Location: Leg   Wound Number: #3   Ankle-Brachial Index:    Pulses:    Removal Indication and Assessment:    Wound Outcome:    (Retired) Wound Type:    (Retired) Wound Length (cm):    (Retired) Wound Width (cm):    (Retired) Depth (cm):    Wound Description (Comments):    Removal Indications:    Wound Image    04/11/23 1346   Dressing Appearance Dried drainage 04/11/23 1346   Drainage Amount Moderate 04/11/23 1346   Drainage Characteristics/Odor Sanguineous 04/11/23 1346   Appearance Pink;Red 04/11/23 1346   Tissue loss description Partial thickness 04/11/23 1346   Black (%), Wound Tissue Color 0 % 04/11/23 1346   Red (%), Wound Tissue Color 100 % 04/11/23 1346   Yellow (%), Wound Tissue Color 0 % 04/11/23 1346   Periwound Area Dry 04/11/23 1346   Wound Edges Open 04/11/23 1346   Wound Length (cm) 3 cm 04/11/23 1346   Wound Width (cm) 0.8 cm 04/11/23 1346   Wound Depth (cm) 0.1 cm 04/11/23 1346   Wound Volume (cm^3) 0.24 cm^3 04/11/23 1346   Wound Surface Area (cm^2) 2.4 cm^2 04/11/23 1346   Care Cleansed with:;Soap and water;Applied:;Skin Barrier 04/11/23 1346   Dressing Applied;Gauze;Cast padding;Compression wrap 04/11/23 1346   Periwound Care Moisture barrier applied 04/11/23 1346   Compression Two layer compression 04/11/23 1346   Dressing  Change Due 04/12/23 04/11/23 8846     Problem List Items Addressed This Visit          Orthopedic    Venous ulcer of right leg - Primary    Overview                                                    Current Assessment & Plan     Clean with baby shampoo and water  Apply kenalog cream to wounds,cover with mepitel and dry gauze  then wrap with kerlix and ace wrap or sure press from toes to just below knee lightly  Change daily and as needed  Apply large edema wear to left leg  Elevate legs as much as possible  Avoid prolonged standing  Monitor closely for s/s of infection including fever, chills, increase in pain, odor from wound, and increased redness from foot. Go to ER if any complications develop.   Keep leg elevated and avoid pressure on wound.  Diabetes:  Monitor glucose closely. Check fasting glucose and 2 hours after meals. HgA1C goal <7, fasting glucose , and 2 hours after meals <180  Hypertension:  Check blood pressure twice daily, goal <120/80  Diet:   Increase protein intake, avoid fried, fatty foods and foods high in simple carbs.   Vitamins:  Take vitamin C 1000 mg, zinc 50mg, vitamin d 5000 units, and a daily multivitamin. Ernie is a good source of protein and nutrients to aid in wound healing.             Future Appointments   Date Time Provider Department Center   5/9/2023  1:00 PM MYA Escobar [facility]   5/29/2023 12:30 PM CRISTINA Smith [facility] CARD [facility]   6/12/2023  3:30 PM Fco Chaparro MD [facility] SHELDON Simental   9/11/2023 11:30 AM Anthony Sorto MD [facility]   1/12/2024  3:00 PM AWV NURSE, [facility] FAMILY MEDICINE [facility] [place] S[place]            Signature:  MYA Escobar  [facility]  [address]  [place] [address]  [phone]    Date of encounter: 4/11/23

## 2023-04-11 ENCOUNTER — OFFICE VISIT (OUTPATIENT)
Dept: FAMILY MEDICINE | Facility: CLINIC | Age: 78
End: 2023-04-11
Payer: COMMERCIAL

## 2023-04-11 ENCOUNTER — OFFICE VISIT (OUTPATIENT)
Dept: WOUND CARE | Facility: CLINIC | Age: 78
End: 2023-04-11
Attending: FAMILY MEDICINE
Payer: COMMERCIAL

## 2023-04-11 VITALS
SYSTOLIC BLOOD PRESSURE: 150 MMHG | DIASTOLIC BLOOD PRESSURE: 60 MMHG | TEMPERATURE: 99 F | HEART RATE: 80 BPM | RESPIRATION RATE: 20 BRPM

## 2023-04-11 VITALS
HEIGHT: 67 IN | OXYGEN SATURATION: 98 % | HEART RATE: 77 BPM | RESPIRATION RATE: 18 BRPM | BODY MASS INDEX: 32.2 KG/M2 | TEMPERATURE: 97 F | WEIGHT: 205.13 LBS | SYSTOLIC BLOOD PRESSURE: 155 MMHG | DIASTOLIC BLOOD PRESSURE: 80 MMHG

## 2023-04-11 DIAGNOSIS — L97.919 VENOUS ULCER OF RIGHT LEG: Primary | ICD-10-CM

## 2023-04-11 DIAGNOSIS — I50.43 ACUTE ON CHRONIC COMBINED SYSTOLIC AND DIASTOLIC CONGESTIVE HEART FAILURE: ICD-10-CM

## 2023-04-11 DIAGNOSIS — M06.9 RHEUMATOID ARTHRITIS, INVOLVING UNSPECIFIED SITE, UNSPECIFIED WHETHER RHEUMATOID FACTOR PRESENT: Primary | ICD-10-CM

## 2023-04-11 DIAGNOSIS — I83.019 VENOUS ULCER OF RIGHT LEG: Primary | ICD-10-CM

## 2023-04-11 DIAGNOSIS — J30.1 SEASONAL ALLERGIC RHINITIS DUE TO POLLEN: ICD-10-CM

## 2023-04-11 PROCEDURE — 3077F SYST BP >= 140 MM HG: CPT | Mod: ,,, | Performed by: INTERNAL MEDICINE

## 2023-04-11 PROCEDURE — 1160F RVW MEDS BY RX/DR IN RCRD: CPT | Mod: CPTII,,, | Performed by: NURSE PRACTITIONER

## 2023-04-11 PROCEDURE — 80048 BASIC METABOLIC PNL TOTAL CA: CPT | Mod: ,,, | Performed by: CLINICAL MEDICAL LABORATORY

## 2023-04-11 PROCEDURE — 80048 BASIC METABOLIC PANEL: ICD-10-PCS | Mod: ,,, | Performed by: CLINICAL MEDICAL LABORATORY

## 2023-04-11 PROCEDURE — 1101F PT FALLS ASSESS-DOCD LE1/YR: CPT | Mod: ,,, | Performed by: INTERNAL MEDICINE

## 2023-04-11 PROCEDURE — 3288F PR FALLS RISK ASSESSMENT DOCUMENTED: ICD-10-PCS | Mod: ,,, | Performed by: INTERNAL MEDICINE

## 2023-04-11 PROCEDURE — 1160F PR REVIEW ALL MEDS BY PRESCRIBER/CLIN PHARMACIST DOCUMENTED: ICD-10-PCS | Mod: CPTII,,, | Performed by: NURSE PRACTITIONER

## 2023-04-11 PROCEDURE — 1126F PR PAIN SEVERITY QUANTIFIED, NO PAIN PRESENT: ICD-10-PCS | Mod: ,,, | Performed by: INTERNAL MEDICINE

## 2023-04-11 PROCEDURE — 99213 PR OFFICE/OUTPT VISIT, EST, LEVL III, 20-29 MIN: ICD-10-PCS | Mod: S$PBB,,, | Performed by: NURSE PRACTITIONER

## 2023-04-11 PROCEDURE — 3288F FALL RISK ASSESSMENT DOCD: CPT | Mod: CPTII,,, | Performed by: NURSE PRACTITIONER

## 2023-04-11 PROCEDURE — 3078F DIAST BP <80 MM HG: CPT | Mod: CPTII,,, | Performed by: NURSE PRACTITIONER

## 2023-04-11 PROCEDURE — 99214 PR OFFICE/OUTPT VISIT, EST, LEVL IV, 30-39 MIN: ICD-10-PCS | Mod: ,,, | Performed by: INTERNAL MEDICINE

## 2023-04-11 PROCEDURE — 1159F MED LIST DOCD IN RCRD: CPT | Mod: ,,, | Performed by: INTERNAL MEDICINE

## 2023-04-11 PROCEDURE — 99213 OFFICE O/P EST LOW 20 MIN: CPT | Mod: S$PBB,,, | Performed by: NURSE PRACTITIONER

## 2023-04-11 PROCEDURE — 1101F PT FALLS ASSESS-DOCD LE1/YR: CPT | Mod: CPTII,,, | Performed by: NURSE PRACTITIONER

## 2023-04-11 PROCEDURE — 1160F RVW MEDS BY RX/DR IN RCRD: CPT | Mod: ,,, | Performed by: INTERNAL MEDICINE

## 2023-04-11 PROCEDURE — 1159F MED LIST DOCD IN RCRD: CPT | Mod: CPTII,,, | Performed by: NURSE PRACTITIONER

## 2023-04-11 PROCEDURE — 3078F PR MOST RECENT DIASTOLIC BLOOD PRESSURE < 80 MM HG: ICD-10-PCS | Mod: CPTII,,, | Performed by: NURSE PRACTITIONER

## 2023-04-11 PROCEDURE — 3077F SYST BP >= 140 MM HG: CPT | Mod: CPTII,,, | Performed by: NURSE PRACTITIONER

## 2023-04-11 PROCEDURE — 3079F DIAST BP 80-89 MM HG: CPT | Mod: ,,, | Performed by: INTERNAL MEDICINE

## 2023-04-11 PROCEDURE — 99214 OFFICE O/P EST MOD 30 MIN: CPT | Mod: ,,, | Performed by: INTERNAL MEDICINE

## 2023-04-11 PROCEDURE — 3077F PR MOST RECENT SYSTOLIC BLOOD PRESSURE >= 140 MM HG: ICD-10-PCS | Mod: ,,, | Performed by: INTERNAL MEDICINE

## 2023-04-11 PROCEDURE — 99215 OFFICE O/P EST HI 40 MIN: CPT | Mod: PBBFAC | Performed by: NURSE PRACTITIONER

## 2023-04-11 PROCEDURE — 3077F PR MOST RECENT SYSTOLIC BLOOD PRESSURE >= 140 MM HG: ICD-10-PCS | Mod: CPTII,,, | Performed by: NURSE PRACTITIONER

## 2023-04-11 PROCEDURE — 1101F PR PT FALLS ASSESS DOC 0-1 FALLS W/OUT INJ PAST YR: ICD-10-PCS | Mod: CPTII,,, | Performed by: NURSE PRACTITIONER

## 2023-04-11 PROCEDURE — 1160F PR REVIEW ALL MEDS BY PRESCRIBER/CLIN PHARMACIST DOCUMENTED: ICD-10-PCS | Mod: ,,, | Performed by: INTERNAL MEDICINE

## 2023-04-11 PROCEDURE — 1101F PR PT FALLS ASSESS DOC 0-1 FALLS W/OUT INJ PAST YR: ICD-10-PCS | Mod: ,,, | Performed by: INTERNAL MEDICINE

## 2023-04-11 PROCEDURE — 1159F PR MEDICATION LIST DOCUMENTED IN MEDICAL RECORD: ICD-10-PCS | Mod: ,,, | Performed by: INTERNAL MEDICINE

## 2023-04-11 PROCEDURE — 3079F PR MOST RECENT DIASTOLIC BLOOD PRESSURE 80-89 MM HG: ICD-10-PCS | Mod: ,,, | Performed by: INTERNAL MEDICINE

## 2023-04-11 PROCEDURE — 1159F PR MEDICATION LIST DOCUMENTED IN MEDICAL RECORD: ICD-10-PCS | Mod: CPTII,,, | Performed by: NURSE PRACTITIONER

## 2023-04-11 PROCEDURE — 3288F PR FALLS RISK ASSESSMENT DOCUMENTED: ICD-10-PCS | Mod: CPTII,,, | Performed by: NURSE PRACTITIONER

## 2023-04-11 PROCEDURE — 3288F FALL RISK ASSESSMENT DOCD: CPT | Mod: ,,, | Performed by: INTERNAL MEDICINE

## 2023-04-11 PROCEDURE — 1126F AMNT PAIN NOTED NONE PRSNT: CPT | Mod: ,,, | Performed by: INTERNAL MEDICINE

## 2023-04-11 RX ORDER — FUROSEMIDE 40 MG/1
40 TABLET ORAL 2 TIMES DAILY
Qty: 60 TABLET | Refills: 1 | Status: CANCELLED | OUTPATIENT
Start: 2023-04-11

## 2023-04-11 RX ORDER — AMLODIPINE BESYLATE 5 MG/1
TABLET ORAL
COMMUNITY
End: 2023-11-02 | Stop reason: SDUPTHER

## 2023-04-11 RX ORDER — INSULIN ASPART 100 [IU]/ML
INJECTION, SUSPENSION SUBCUTANEOUS
COMMUNITY

## 2023-04-12 PROBLEM — I50.43 ACUTE ON CHRONIC COMBINED SYSTOLIC AND DIASTOLIC CONGESTIVE HEART FAILURE: Status: ACTIVE | Noted: 2021-08-03

## 2023-04-12 PROBLEM — J30.1 SEASONAL ALLERGIC RHINITIS DUE TO POLLEN: Status: ACTIVE | Noted: 2023-04-12

## 2023-04-12 LAB
ANION GAP SERPL CALCULATED.3IONS-SCNC: 7 MMOL/L (ref 7–16)
BUN SERPL-MCNC: 38 MG/DL (ref 7–18)
BUN/CREAT SERPL: 22 (ref 6–20)
CALCIUM SERPL-MCNC: 9.1 MG/DL (ref 8.5–10.1)
CHLORIDE SERPL-SCNC: 103 MMOL/L (ref 98–107)
CO2 SERPL-SCNC: 35 MMOL/L (ref 21–32)
CREAT SERPL-MCNC: 1.71 MG/DL (ref 0.55–1.02)
EGFR (NO RACE VARIABLE) (RUSH/TITUS): 31 ML/MIN/1.73M²
GLUCOSE SERPL-MCNC: 141 MG/DL (ref 74–106)
POTASSIUM SERPL-SCNC: 4.8 MMOL/L (ref 3.5–5.1)
SODIUM SERPL-SCNC: 140 MMOL/L (ref 136–145)

## 2023-04-12 RX ORDER — LORATADINE 10 MG/1
10 TABLET ORAL DAILY
Qty: 90 TABLET | Refills: 1 | Status: SHIPPED | OUTPATIENT
Start: 2023-04-12

## 2023-04-12 RX ORDER — TORSEMIDE 20 MG/1
40 TABLET ORAL EVERY MORNING
Qty: 180 TABLET | Refills: 1 | Status: SHIPPED | OUTPATIENT
Start: 2023-04-12 | End: 2023-11-02 | Stop reason: SDUPTHER

## 2023-04-12 RX ORDER — OMEPRAZOLE 20 MG/1
20 CAPSULE, DELAYED RELEASE ORAL DAILY
Qty: 90 CAPSULE | Refills: 1 | Status: SHIPPED | OUTPATIENT
Start: 2023-04-12 | End: 2023-11-02 | Stop reason: SDUPTHER

## 2023-04-12 RX ORDER — FERROUS SULFATE 325(65) MG
325 TABLET, DELAYED RELEASE (ENTERIC COATED) ORAL DAILY
Qty: 90 TABLET | Refills: 1 | Status: SHIPPED | OUTPATIENT
Start: 2023-04-12

## 2023-04-12 RX ORDER — NAPROXEN 500 MG/1
500 TABLET ORAL 2 TIMES DAILY PRN
Qty: 20 TABLET | Refills: 1 | Status: SHIPPED | OUTPATIENT
Start: 2023-04-12 | End: 2024-02-27 | Stop reason: SINTOL

## 2023-04-12 RX ORDER — FUROSEMIDE 40 MG/1
40 TABLET ORAL 3 TIMES DAILY
Qty: 90 TABLET | Refills: 0 | Status: SHIPPED | OUTPATIENT
Start: 2023-04-12 | End: 2023-05-18 | Stop reason: SDUPTHER

## 2023-04-12 NOTE — PROGRESS NOTES
Subjective:       Patient ID: Delma Rae is a 77 y.o. female.    Chief Complaint: Follow-up    Follow-up  Pertinent negatives include no abdominal pain, chest pain, fatigue or fever.   .  Patient seen and evaluated today.  Patient is awake alert patient complains of worsening shortness of breath also complains of worsening swelling in both lower extremities.  Patient has diffuse crackles in her lungs she has significant +4 pitting edema in lower extremities.    She is history of hypertension and rheumatoid arthritis.  Family is requesting a rheumatologist will refer to rheumatologist at rest.    In regards to the CHF from going to increase her Lasix to 40 mg 1 p.o. 3 times a day for the next 3 days and back to 40 mg b.i.d..  Will also check a BNP today as well.  Patient has chronic medical problems which include CHF, hypertension, and rheumatoid arthritis.  Patient also is anemic and I am going to refill her ferrous sulfate.    Current Medications:    Current Outpatient Medications:     albuterol (PROVENTIL/VENTOLIN HFA) 90 mcg/actuation inhaler, Inhale 2 puffs into the lungs every 4 (four) hours as needed for Wheezing or Shortness of Breath., Disp: 18 g, Rfl: 1    amitriptyline (ELAVIL) 10 MG tablet, Take 1 tablet by mouth every evening., Disp: , Rfl:     amLODIPine (NORVASC) 5 MG tablet, 1 tablet Orally Once a day, Disp: , Rfl:     amoxicillin (AMOXIL) 500 MG capsule, Take 500 mg by mouth 2 (two) times daily., Disp: , Rfl:     aspirin (ECOTRIN) 81 MG EC tablet, Take 81 mg by mouth once daily., Disp: , Rfl:     atorvastatin (LIPITOR) 40 MG tablet, Take 1 tablet (40 mg total) by mouth once daily., Disp: 90 tablet, Rfl: 1    blood sugar diagnostic (ONETOUCH ULTRA BLUE TEST STRIP) Strp, 1 strip by Other route 4 (four) times daily before meals and nightly. Dx e11.9, Disp: 200 strip, Rfl: 5    brimonidine 0.2% (ALPHAGAN) 0.2 % Drop, Place 1 drop into both eyes 2 (two) times daily., Disp: , Rfl:     fluticasone  "furoate-vilanteroL (BREO ELLIPTA) 100-25 mcg/dose diskus inhaler, Inhale 1 puff into the lungs daily as needed., Disp: , Rfl:     FLUTICASONE PROPIONATE NASL, 1 spray by Each Nostril route daily as needed., Disp: , Rfl:     folic acid (FOLVITE) 1 MG tablet, Take 1 mg by mouth once daily., Disp: , Rfl:     hydrALAZINE (APRESOLINE) 25 MG tablet, TAKE ONE TABLET BY MOUTH THREE TIMES DAILY, Disp: 270 tablet, Rfl: 1    insulin asp prt-insulin aspart, NovoLOG 70/30, (NOVOLOG 70/30) 100 unit/mL (70-30) Soln, Subcutaneous, Disp: , Rfl:     insulin NPH-insulin regular, 70/30, (NOVOLIN 70/30 U-100 INSULIN) 100 unit/mL (70-30) injection, INJECT 40 UNITS UNDER SKIN IN THE MORNING AND 15 IN THE EVENING., Disp: 50 mL, Rfl: 6    insulin syringe-needle U-100 1 mL 31 gauge x 5/16 Syrg, Inject 1 each into the skin 2 (two) times a day. Dx e11.9, Disp: 200 each, Rfl: 3    insulin syringe-needle U-100 1/2 mL 31 gauge x 15/64" Syrg, by Misc.(Non-Drug; Combo Route) route. Use as directed with insulin, Disp: , Rfl:     lancets Misc, 1 each by skin prick route 4 (four) times daily before meals and nightly., Disp: 200 each, Rfl: 5    latanoprost 0.005 % ophthalmic solution, Place 1 drop into both eyes once daily., Disp: , Rfl:     losartan (COZAAR) 100 MG tablet, Take 1 tablet (100 mg total) by mouth once daily., Disp: 90 tablet, Rfl: 5    meloxicam (MOBIC) 7.5 MG tablet, Take 1 tablet (7.5 mg total) by mouth once daily., Disp: 30 tablet, Rfl: 1    methotrexate 2.5 MG Tab, Take 2.5 mg by mouth every 7 days. 5 tablets q7 days, Disp: , Rfl:     metoprolol tartrate (LOPRESSOR) 25 MG tablet, Take 1 tablet (25 mg total) by mouth 3 (three) times daily., Disp: 90 tablet, Rfl: 5    multivit-min-FA-lycopen-lutein (CENTRUM SILVER) 0.4 mg-300 mcg- 250 mcg Tab, Take 1 tablet by mouth once daily., Disp: , Rfl:     multivitamin/iron/folic acid (CENTRUM ULTRA WOMEN'S ORAL), Take 1 tablet by mouth once daily., Disp: , Rfl:     potassium chloride SA " (K-DUR,KLOR-CON M) 10 MEQ tablet, Take 1 tablet (10 mEq total) by mouth 2 (two) times daily., Disp: 60 tablet, Rfl: 3    predniSONE (DELTASONE) 2.5 MG tablet, Take 2.5 mg by mouth once daily at 6am., Disp: , Rfl:     pregabalin (LYRICA) 50 MG capsule, Take 1 capsule by mouth 3 (three) times daily., Disp: , Rfl:     triamcinolone acetonide 0.025% (KENALOG) 0.025 % cream, Apply topically 2 (two) times daily., Disp: 454 g, Rfl: 2    venlafaxine (EFFEXOR-XR) 75 MG 24 hr capsule, Take 1 capsule (75 mg total) by mouth every other day., Disp: 45 capsule, Rfl: 1    zolpidem (AMBIEN) 5 MG Tab, Take 1 tablet (5 mg total) by mouth nightly as needed., Disp: 1 tablet, Rfl: 0    ferrous sulfate 325 (65 FE) MG EC tablet, Take 1 tablet (325 mg total) by mouth once daily., Disp: 90 tablet, Rfl: 1    furosemide (LASIX) 40 MG tablet, Take 1 tablet (40 mg total) by mouth 3 (three) times daily., Disp: 90 tablet, Rfl: 0    hydrOXYchloroQUINE (PLAQUENIL) 200 mg tablet, Take 1 tablet (200 mg total) by mouth once daily. (Patient not taking: Reported on 4/11/2023), Disp: 90 tablet, Rfl: 1    loratadine (CLARITIN) 10 mg tablet, Take 1 tablet (10 mg total) by mouth once daily., Disp: 90 tablet, Rfl: 1    naproxen (NAPROSYN) 500 MG tablet, Take 1 tablet (500 mg total) by mouth 2 (two) times daily as needed., Disp: 20 tablet, Rfl: 1    omeprazole (PRILOSEC) 20 MG capsule, Take 1 capsule (20 mg total) by mouth once daily., Disp: 90 capsule, Rfl: 1    torsemide (DEMADEX) 20 MG Tab, Take 2 tablets (40 mg total) by mouth every morning., Disp: 180 tablet, Rfl: 1           Review of Systems   Constitutional:  Negative for appetite change, fatigue and fever.   Respiratory:  Positive for shortness of breath.    Cardiovascular:  Positive for leg swelling. Negative for chest pain.   Gastrointestinal:  Negative for abdominal pain and constipation.   Endocrine: Negative for polydipsia, polyphagia and polyuria.   Genitourinary:  Negative for difficulty  "urinating, frequency and hot flashes.   Musculoskeletal:  Positive for leg pain.   Allergic/Immunologic: Negative for environmental allergies.   Neurological:  Negative for dizziness and light-headedness.   Psychiatric/Behavioral:  Negative for agitation.               Vitals:    04/11/23 1555 04/11/23 1556   BP: (!) 141/75 (!) 155/80   BP Location: Right arm Left arm   Patient Position: Sitting    BP Method: Large (Automatic)    Pulse: 77    Resp: 18    Temp: 97.1 °F (36.2 °C)    TempSrc: Temporal    SpO2: 98%    Weight: 93 kg (205 lb 1.6 oz)    Height: 5' 7" (1.702 m)         Physical Exam  Vitals and nursing note reviewed.   Constitutional:       Appearance: Normal appearance. She is obese.   Cardiovascular:      Rate and Rhythm: Normal rate and regular rhythm.      Pulses: Normal pulses.      Heart sounds: Normal heart sounds.   Pulmonary:      Effort: Pulmonary effort is normal.      Breath sounds: Rales present.   Abdominal:      General: Abdomen is flat. Bowel sounds are normal.      Palpations: Abdomen is soft.   Musculoskeletal:         General: Normal range of motion.   Skin:     General: Skin is warm and dry.   Neurological:      General: No focal deficit present.      Mental Status: She is alert and oriented to person, place, and time. Mental status is at baseline.         Last Labs:     Office Visit on 04/11/2023   Component Date Value    Sodium 04/11/2023 140     Potassium 04/11/2023 4.8     Chloride 04/11/2023 103     CO2 04/11/2023 35 (H)     Anion Gap 04/11/2023 7     Glucose 04/11/2023 141 (H)     BUN 04/11/2023 38 (H)     Creatinine 04/11/2023 1.71 (H)     BUN/Creatinine Ratio 04/11/2023 22 (H)     Calcium 04/11/2023 9.1     eGFR 04/11/2023 31 (L)        Last Imaging:  X-Ray Chest PA And Lateral  Narrative: EXAMINATION:  XR CHEST PA AND LATERAL    CLINICAL HISTORY:  Heart failure, unspecified    TECHNIQUE:  XR CHEST PA AND LATERAL    COMPARISON:  2021    FINDINGS:  No lines or tubes.    Perihilar " and lower lobe airspace opacities, edema versus infection.    Normal pleura.    Cardiac silhouette is similar to comparison exam.    No obvious acute bone findings.  Impression: Perihilar and lower lobe airspace opacities, edema versus infection.    Electronically signed by: Yuan Carlton  Date:    11/03/2022  Time:    07:57         **Labs and x-rays personally reviewed by me    ** reviewed      Objective:        Assessment:       1. Rheumatoid arthritis, involving unspecified site, unspecified whether rheumatoid factor present  Basic Metabolic Panel    Ambulatory referral/consult to Rheumatology    Basic Metabolic Panel      2. Seasonal allergic rhinitis due to pollen  loratadine (CLARITIN) 10 mg tablet      3. Acute on chronic combined systolic and diastolic congestive heart failure             Plan:         [unfilled]

## 2023-04-28 ENCOUNTER — EXTERNAL HOME HEALTH (OUTPATIENT)
Dept: HOME HEALTH SERVICES | Facility: HOSPITAL | Age: 78
End: 2023-04-28
Payer: COMMERCIAL

## 2023-05-18 RX ORDER — FUROSEMIDE 40 MG/1
40 TABLET ORAL 3 TIMES DAILY
Qty: 90 TABLET | Refills: 0 | Status: SHIPPED | OUTPATIENT
Start: 2023-05-18 | End: 2023-06-21 | Stop reason: ALTCHOICE

## 2023-05-18 NOTE — TELEPHONE ENCOUNTER
----- Message from Naomi You sent at 5/18/2023  1:44 PM CDT -----  PT CALLED REQUESTING  REFILL            Furosemide (LASIX) 40 MG tablet                [id number] [place] [facility] [address]              [phone]

## 2023-06-01 RX ORDER — ATORVASTATIN CALCIUM 40 MG/1
TABLET, FILM COATED ORAL
Qty: 90 TABLET | Refills: 1 | Status: SHIPPED | OUTPATIENT
Start: 2023-06-01 | End: 2023-06-07 | Stop reason: SDUPTHER

## 2023-06-08 RX ORDER — ATORVASTATIN CALCIUM 40 MG/1
40 TABLET, FILM COATED ORAL DAILY
Qty: 90 TABLET | Refills: 1 | Status: SHIPPED | OUTPATIENT
Start: 2023-06-08 | End: 2023-11-02 | Stop reason: SDUPTHER

## 2023-06-09 ENCOUNTER — LAB REQUISITION (OUTPATIENT)
Dept: LAB | Facility: HOSPITAL | Age: 78
End: 2023-06-09
Attending: INTERNAL MEDICINE
Payer: COMMERCIAL

## 2023-06-09 DIAGNOSIS — E11.51 TYPE 2 DIABETES MELLITUS WITH DIABETIC PERIPHERAL ANGIOPATHY WITHOUT GANGRENE: ICD-10-CM

## 2023-06-09 DIAGNOSIS — Z71.89 COMPLEX CARE COORDINATION: ICD-10-CM

## 2023-06-09 LAB
EST. AVERAGE GLUCOSE BLD GHB EST-MCNC: 117 MG/DL
HBA1C MFR BLD HPLC: 6.1 % (ref 4.5–6.6)

## 2023-06-09 PROCEDURE — 83036 HEMOGLOBIN GLYCOSYLATED A1C: CPT | Performed by: INTERNAL MEDICINE

## 2023-06-19 ENCOUNTER — OFFICE VISIT (OUTPATIENT)
Dept: WOUND CARE | Facility: CLINIC | Age: 78
End: 2023-06-19
Attending: FAMILY MEDICINE
Payer: COMMERCIAL

## 2023-06-19 VITALS
HEART RATE: 72 BPM | RESPIRATION RATE: 18 BRPM | DIASTOLIC BLOOD PRESSURE: 66 MMHG | SYSTOLIC BLOOD PRESSURE: 102 MMHG | TEMPERATURE: 98 F

## 2023-06-19 DIAGNOSIS — I83.019 VENOUS ULCER OF RIGHT LEG: Primary | ICD-10-CM

## 2023-06-19 DIAGNOSIS — L97.919 VENOUS ULCER OF RIGHT LEG: Primary | ICD-10-CM

## 2023-06-19 DIAGNOSIS — I73.9 PVD (PERIPHERAL VASCULAR DISEASE): ICD-10-CM

## 2023-06-19 DIAGNOSIS — L97.812 NON-PRESSURE CHRONIC ULCER OF OTHER PART OF RIGHT LOWER LEG WITH FAT LAYER EXPOSED: ICD-10-CM

## 2023-06-19 PROCEDURE — 1160F PR REVIEW ALL MEDS BY PRESCRIBER/CLIN PHARMACIST DOCUMENTED: ICD-10-PCS | Mod: CPTII,,, | Performed by: NURSE PRACTITIONER

## 2023-06-19 PROCEDURE — 3044F HG A1C LEVEL LT 7.0%: CPT | Mod: CPTII,,, | Performed by: NURSE PRACTITIONER

## 2023-06-19 PROCEDURE — 99213 OFFICE O/P EST LOW 20 MIN: CPT | Mod: PBBFAC | Performed by: NURSE PRACTITIONER

## 2023-06-19 PROCEDURE — 1159F MED LIST DOCD IN RCRD: CPT | Mod: CPTII,,, | Performed by: NURSE PRACTITIONER

## 2023-06-19 PROCEDURE — 99212 OFFICE O/P EST SF 10 MIN: CPT | Mod: S$PBB,,, | Performed by: NURSE PRACTITIONER

## 2023-06-19 PROCEDURE — 3044F PR MOST RECENT HEMOGLOBIN A1C LEVEL <7.0%: ICD-10-PCS | Mod: CPTII,,, | Performed by: NURSE PRACTITIONER

## 2023-06-19 PROCEDURE — 3078F PR MOST RECENT DIASTOLIC BLOOD PRESSURE < 80 MM HG: ICD-10-PCS | Mod: CPTII,,, | Performed by: NURSE PRACTITIONER

## 2023-06-19 PROCEDURE — 3078F DIAST BP <80 MM HG: CPT | Mod: CPTII,,, | Performed by: NURSE PRACTITIONER

## 2023-06-19 PROCEDURE — 3074F PR MOST RECENT SYSTOLIC BLOOD PRESSURE < 130 MM HG: ICD-10-PCS | Mod: CPTII,,, | Performed by: NURSE PRACTITIONER

## 2023-06-19 PROCEDURE — 3074F SYST BP LT 130 MM HG: CPT | Mod: CPTII,,, | Performed by: NURSE PRACTITIONER

## 2023-06-19 PROCEDURE — 1159F PR MEDICATION LIST DOCUMENTED IN MEDICAL RECORD: ICD-10-PCS | Mod: CPTII,,, | Performed by: NURSE PRACTITIONER

## 2023-06-19 PROCEDURE — 99212 PR OFFICE/OUTPT VISIT, EST, LEVL II, 10-19 MIN: ICD-10-PCS | Mod: S$PBB,,, | Performed by: NURSE PRACTITIONER

## 2023-06-19 PROCEDURE — 1160F RVW MEDS BY RX/DR IN RCRD: CPT | Mod: CPTII,,, | Performed by: NURSE PRACTITIONER

## 2023-06-19 RX ORDER — TRIAMCINOLONE ACETONIDE 0.25 MG/G
CREAM TOPICAL 2 TIMES DAILY
Qty: 454 G | Refills: 2 | Status: SHIPPED | OUTPATIENT
Start: 2023-06-19 | End: 2023-08-10 | Stop reason: SDUPTHER

## 2023-06-19 NOTE — PROGRESS NOTES
MYA Escobar   [facility]  [address]  [facility] [place] [address]  [phone]      PATIENT NAME: Delma Rae  : 1945  DATE: 23  MRN: [id number]      Billing Provider: MYA Escobar  Level of Service:   Patient PCP Information       Provider PCP Type    Fco Chaparro MD General            Reason for Visit / Chief Complaint: Venous Ulcer       History of Present Illness / Problem Focused Workflow     Delma Rae is a 77 y.o. female who presents to clinic for follow up on chronic-non healing wound on the right lower leg. Wounds continue to improve with kenalog cream. Continue current plan of care.     Arterial doppler 2022, No areas of occlusion. No doubling of peak systolic velocity between adjacent stations to specifically suggest high-grade stenosis of a focal nature. Ankle brachial indices measure normal at 1.27 right and 1.19 left.     Significant PMH of CHF, COPD, hypertension, MI, rheumatoid arthritis, hyperlipidemia, GERD, and diabetes. Last HgA1C 7 in , diabetes managed by PCP.  Pertinent factors that delay wound healing include multiple co-morbidities, poor vascular supply, diabetes, edema, heavy drainage, excessive wound moisture and macerated tissue, large surface area, advanced age and fragile skin. Denies fever and chills.      Review of Systems     Review of Systems   Constitutional:  Positive for activity change. Negative for chills and fever.   Respiratory:  Negative for chest tightness and shortness of breath.    Cardiovascular:  Positive for leg swelling. Negative for chest pain and palpitations.   Musculoskeletal:  Positive for arthralgias and joint swelling.   Skin:  Positive for wound.        See wound assessment   Neurological:  Positive for weakness and numbness.   Psychiatric/Behavioral:  Negative for agitation, behavioral problems, confusion and self-injury.      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 Chaidez    GERD (gastroesophageal reflux disease)     HHD (hypertensive heart disease)     Hyperlipidemia     Hypertension     MI (myocardial infarction)     OA (osteoarthritis)     Rheumatoid arthritis        Past Surgical History:   Procedure Laterality Date    HYSTERECTOMY      OOPHORECTOMY      VAGINAL DELIVERY      x 1       Social History  Ms. Delma Rae  reports that she has never smoked. She has never used smokeless tobacco. She reports that she does not drink alcohol and does not use drugs.    Family History  Ms.'s Delma Rae family history includes Cancer in her brother and father; Coronary artery disease in her brother and sister; Diabetes in her father and mother; Hypertension in her mother and son; Prostate cancer in her father.    Medications and Allergies     Medications  Outpatient Medications Marked as Taking for the 6/19/23 encounter (Office Visit) with MYA Escobar   Medication Sig Dispense Refill    albuterol (PROVENTIL/VENTOLIN HFA) 90 mcg/actuation inhaler Inhale 2 puffs into the lungs every 4 (four) hours as needed for Wheezing or Shortness of Breath. 18 g 1    amitriptyline (ELAVIL) 10 MG tablet Take 1 tablet by mouth every evening.      amLODIPine (NORVASC) 5 MG tablet 1 tablet Orally Once a day      aspirin (ECOTRIN) 81 MG EC tablet Take 81 mg by mouth once daily.      atorvastatin (LIPITOR) 40 MG tablet Take 1 tablet (40 mg total) by mouth once daily. 90 tablet 1    blood sugar diagnostic (ONETOUCH ULTRA BLUE TEST STRIP) Strp 1 strip by Other route 4 (four) times daily before meals and nightly. Dx  "e11.9 200 strip 5    brimonidine 0.2% (ALPHAGAN) 0.2 % Drop Place 1 drop into both eyes 2 (two) times daily.      ferrous sulfate 325 (65 FE) MG EC tablet Take 1 tablet (325 mg total) by mouth once daily. 90 tablet 1    fluticasone furoate-vilanteroL (BREO ELLIPTA) 100-25 mcg/dose diskus inhaler Inhale 1 puff into the lungs daily as needed.      FLUTICASONE PROPIONATE NASL 1 spray by Each Nostril route daily as needed.      folic acid (FOLVITE) 1 MG tablet Take 1 mg by mouth once daily.      furosemide (LASIX) 40 MG tablet Take 1 tablet (40 mg total) by mouth 3 (three) times daily. 90 tablet 0    hydrALAZINE (APRESOLINE) 25 MG tablet TAKE ONE TABLET BY MOUTH THREE TIMES DAILY 270 tablet 1    insulin asp prt-insulin aspart, NovoLOG 70/30, (NOVOLOG 70/30) 100 unit/mL (70-30) Soln Subcutaneous      insulin NPH-insulin regular, 70/30, (NOVOLIN 70/30 U-100 INSULIN) 100 unit/mL (70-30) injection INJECT 40 UNITS UNDER SKIN IN THE MORNING AND 15 IN THE EVENING. 50 mL 6    insulin syringe-needle U-100 1 mL 31 gauge x 5/16 Syrg Inject 1 each into the skin 2 (two) times a day. Dx e11.9 200 each 3    insulin syringe-needle U-100 1/2 mL 31 gauge x 15/64" Syrg by Misc.(Non-Drug; Combo Route) route. Use as directed with insulin      lancets Misc 1 each by skin prick route 4 (four) times daily before meals and nightly. 200 each 5    latanoprost 0.005 % ophthalmic solution Place 1 drop into both eyes once daily.      loratadine (CLARITIN) 10 mg tablet Take 1 tablet (10 mg total) by mouth once daily. 90 tablet 1    losartan (COZAAR) 100 MG tablet Take 1 tablet (100 mg total) by mouth once daily. 90 tablet 5    meloxicam (MOBIC) 7.5 MG tablet Take 1 tablet (7.5 mg total) by mouth once daily. 30 tablet 1    methotrexate 2.5 MG Tab Take 2.5 mg by mouth every 7 days. 5 tablets q7 days      metoprolol tartrate (LOPRESSOR) 25 MG tablet Take 1 tablet (25 mg total) by mouth 3 (three) times daily. 90 tablet 5    " multivit-min-FA-lycopen-lutein (CENTRUM SILVER) 0.4 mg-300 mcg- 250 mcg Tab Take 1 tablet by mouth once daily.      naproxen (NAPROSYN) 500 MG tablet Take 1 tablet (500 mg total) by mouth 2 (two) times daily as needed. 20 tablet 1    omeprazole (PRILOSEC) 20 MG capsule Take 1 capsule (20 mg total) by mouth once daily. 90 capsule 1    potassium chloride SA (K-DUR,KLOR-CON M) 10 MEQ tablet Take 1 tablet (10 mEq total) by mouth 2 (two) times daily. 60 tablet 3    predniSONE (DELTASONE) 2.5 MG tablet Take 2.5 mg by mouth once daily at 6am.      pregabalin (LYRICA) 50 MG capsule Take 1 capsule by mouth 3 (three) times daily.      torsemide (DEMADEX) 20 MG Tab Take 2 tablets (40 mg total) by mouth every morning. 180 tablet 1    triamcinolone acetonide 0.025% (KENALOG) 0.025 % cream Apply topically 2 (two) times daily. 454 g 2    venlafaxine (EFFEXOR-XR) 75 MG 24 hr capsule Take 1 capsule (75 mg total) by mouth every other day. 45 capsule 1    zolpidem (AMBIEN) 5 MG Tab Take 1 tablet (5 mg total) by mouth nightly as needed. 1 tablet 0       Allergies  Review of patient's allergies indicates:  No Known Allergies    Physical Examination     Vitals:    06/19/23 1407   BP: 102/66   Pulse: 72   Resp: 18   Temp: 97.6 °F (36.4 °C)     Physical Exam  Vitals and nursing note reviewed.   Constitutional:       Appearance: Normal appearance.   HENT:      Head: Normocephalic.   Cardiovascular:      Rate and Rhythm: Normal rate and regular rhythm.      Pulses: Normal pulses.      Heart sounds: Normal heart sounds.   Pulmonary:      Effort: Pulmonary effort is normal. No respiratory distress.   Chest:      Chest wall: No tenderness.   Musculoskeletal:         General: Swelling present.      Right lower leg: Edema present.      Left lower leg: Edema present.   Skin:     General: Skin is warm and dry.      Capillary Refill: Capillary refill takes 2 to 3 seconds.      Comments: See LDA for photo and measurements   Neurological:       General: No focal deficit present.      Mental Status: She is alert and oriented to person, place, and time. Mental status is at baseline.   Psychiatric:         Mood and Affect: Mood normal.         Behavior: Behavior normal.         Thought Content: Thought content normal.         Judgment: Judgment normal.     Assessment and Plan             Wound 09/27/21 Right lower;lateral Leg #3 (Active)   09/27/21     Pre-existing:    Primary Wound Type:    Side: Right   Orientation: lower;lateral   Location: Leg   Wound Number: #3   Ankle-Brachial Index:    Pulses:    Removal Indication and Assessment:    Wound Outcome:    (Retired) Wound Type:    (Retired) Wound Length (cm):    (Retired) Wound Width (cm):    (Retired) Depth (cm):    Wound Description (Comments):    Removal Indications:    Wound Image    06/19/23 1436   Dressing Appearance Dried drainage 06/19/23 1436   Drainage Amount Moderate 06/19/23 1436   Drainage Characteristics/Odor Sanguineous 06/19/23 1436   Appearance Pink;Red 06/19/23 1436   Tissue loss description Full thickness 06/19/23 1436   Black (%), Wound Tissue Color 0 % 06/19/23 1436   Red (%), Wound Tissue Color 100 % 06/19/23 1436   Yellow (%), Wound Tissue Color 0 % 06/19/23 1436   Periwound Area Dry;[place] 06/19/23 1436   Wound Edges Open 06/19/23 1436   Wound Length (cm) 7 cm 06/19/23 1436   Wound Width (cm) 8 cm 06/19/23 1436   Wound Depth (cm) 0.1 cm 06/19/23 1436   Wound Volume (cm^3) 5.6 cm^3 06/19/23 1436   Wound Surface Area (cm^2) 56 cm^2 06/19/23 1436   Care Cleansed with:;Soap and water;Applied:;Moisturizing agent 06/19/23 1436   Dressing Applied;Gauze, wet to dry;Gauze;Rolled gauze 06/19/23 1436   Periwound Care Moisture barrier applied 06/19/23 1436   Dressing Change Due 06/20/23 06/19/23 1436     Problem List Items Addressed This Visit          Cardiac/Vascular    PVD (peripheral vascular disease)    Overview     Last arterial doppler 9/2021  Ankle brachial indices are 1.24 on the right  and 1.2 on the left.            Orthopedic    Venous ulcer of right leg - Primary    Overview                                              Current Assessment & Plan     Clean with baby shampoo and water  Apply kenalog cream to wounds,cover with mepitel and dry gauze  then wrap with kerlix and ace wrap or sure press from toes to just below knee lightly  Change daily and as needed  Apply large edema wear to left leg  Elevate legs as much as possible  Avoid prolonged standing  Monitor closely for s/s of infection including fever, chills, increase in pain, odor from wound, and increased redness from foot. Go to ER if any complications develop.   Keep leg elevated and avoid pressure on wound.  Diabetes:  Monitor glucose closely. Check fasting glucose and 2 hours after meals. HgA1C goal <7, fasting glucose , and 2 hours after meals <180  Hypertension:  Check blood pressure twice daily, goal <120/80  Diet:   Increase protein intake, avoid fried, fatty foods and foods high in simple carbs.   Vitamins:  Take vitamin C 1000 mg, zinc 50mg, vitamin d 5000 units, and a daily multivitamin. Ernie is a good source of protein and nutrients to aid in wound healing.          Non-pressure chronic ulcer of other part of right lower leg with fat layer exposed    Overview                         Future Appointments   Date Time Provider Department Center   6/21/2023  1:15 PM CRISTINA Smith [facility]   7/17/2023  1:00 PM [facility] Esc[facility]   9/11/2023 11:30 AM Anthony Sorto MD [facility] [place] [facility]   1/12/2024  3:00 PM AWV NURSE, [facility] SHELDON S[place]            Signature:  MYA Escobar  [facility]  [address]  [place] [address]  [phone]    Date of encounter: 6/19/23

## 2023-06-21 ENCOUNTER — OFFICE VISIT (OUTPATIENT)
Dept: CARDIOLOGY | Facility: CLINIC | Age: 78
End: 2023-06-21
Payer: COMMERCIAL

## 2023-06-21 VITALS
HEART RATE: 70 BPM | SYSTOLIC BLOOD PRESSURE: 120 MMHG | DIASTOLIC BLOOD PRESSURE: 62 MMHG | OXYGEN SATURATION: 94 % | HEIGHT: 67 IN | BODY MASS INDEX: 32.12 KG/M2

## 2023-06-21 DIAGNOSIS — I42.9 CARDIOMYOPATHY, UNSPECIFIED TYPE: Primary | ICD-10-CM

## 2023-06-21 DIAGNOSIS — I10 ESSENTIAL HYPERTENSION: Chronic | ICD-10-CM

## 2023-06-21 DIAGNOSIS — I25.10 CORONARY ARTERY DISEASE INVOLVING NATIVE CORONARY ARTERY OF NATIVE HEART WITHOUT ANGINA PECTORIS: ICD-10-CM

## 2023-06-21 DIAGNOSIS — I50.42 CHRONIC COMBINED SYSTOLIC AND DIASTOLIC CONGESTIVE HEART FAILURE: ICD-10-CM

## 2023-06-21 PROCEDURE — 3078F DIAST BP <80 MM HG: CPT | Mod: CPTII,,, | Performed by: NURSE PRACTITIONER

## 2023-06-21 PROCEDURE — 1159F PR MEDICATION LIST DOCUMENTED IN MEDICAL RECORD: ICD-10-PCS | Mod: CPTII,,, | Performed by: NURSE PRACTITIONER

## 2023-06-21 PROCEDURE — 3074F SYST BP LT 130 MM HG: CPT | Mod: CPTII,,, | Performed by: NURSE PRACTITIONER

## 2023-06-21 PROCEDURE — 99214 OFFICE O/P EST MOD 30 MIN: CPT | Mod: S$PBB,,, | Performed by: NURSE PRACTITIONER

## 2023-06-21 PROCEDURE — 99214 PR OFFICE/OUTPT VISIT, EST, LEVL IV, 30-39 MIN: ICD-10-PCS | Mod: S$PBB,,, | Performed by: NURSE PRACTITIONER

## 2023-06-21 PROCEDURE — 99215 OFFICE O/P EST HI 40 MIN: CPT | Mod: PBBFAC | Performed by: NURSE PRACTITIONER

## 2023-06-21 PROCEDURE — 1160F RVW MEDS BY RX/DR IN RCRD: CPT | Mod: CPTII,,, | Performed by: NURSE PRACTITIONER

## 2023-06-21 PROCEDURE — 1159F MED LIST DOCD IN RCRD: CPT | Mod: CPTII,,, | Performed by: NURSE PRACTITIONER

## 2023-06-21 PROCEDURE — 3074F PR MOST RECENT SYSTOLIC BLOOD PRESSURE < 130 MM HG: ICD-10-PCS | Mod: CPTII,,, | Performed by: NURSE PRACTITIONER

## 2023-06-21 PROCEDURE — 1160F PR REVIEW ALL MEDS BY PRESCRIBER/CLIN PHARMACIST DOCUMENTED: ICD-10-PCS | Mod: CPTII,,, | Performed by: NURSE PRACTITIONER

## 2023-06-21 PROCEDURE — 3044F PR MOST RECENT HEMOGLOBIN A1C LEVEL <7.0%: ICD-10-PCS | Mod: CPTII,,, | Performed by: NURSE PRACTITIONER

## 2023-06-21 PROCEDURE — 3078F PR MOST RECENT DIASTOLIC BLOOD PRESSURE < 80 MM HG: ICD-10-PCS | Mod: CPTII,,, | Performed by: NURSE PRACTITIONER

## 2023-06-21 PROCEDURE — 3044F HG A1C LEVEL LT 7.0%: CPT | Mod: CPTII,,, | Performed by: NURSE PRACTITIONER

## 2023-06-21 RX ORDER — PANTOPRAZOLE SODIUM 40 MG/1
40 TABLET, DELAYED RELEASE ORAL DAILY
COMMUNITY
End: 2023-11-02 | Stop reason: SDUPTHER

## 2023-06-21 RX ORDER — METOPROLOL SUCCINATE 100 MG/1
100 TABLET, EXTENDED RELEASE ORAL DAILY
Qty: 30 TABLET | Refills: 1 | Status: SHIPPED | OUTPATIENT
Start: 2023-06-21 | End: 2023-08-14 | Stop reason: SDUPTHER

## 2023-06-21 RX ORDER — DAPAGLIFLOZIN 10 MG/1
10 TABLET, FILM COATED ORAL DAILY
Qty: 30 TABLET | Refills: 11 | Status: SHIPPED | OUTPATIENT
Start: 2023-06-21 | End: 2023-11-02 | Stop reason: SDUPTHER

## 2023-06-22 ENCOUNTER — EXTERNAL HOME HEALTH (OUTPATIENT)
Dept: HOME HEALTH SERVICES | Facility: HOSPITAL | Age: 78
End: 2023-06-22
Payer: COMMERCIAL

## 2023-06-23 PROBLEM — I50.42 CHRONIC COMBINED SYSTOLIC AND DIASTOLIC CONGESTIVE HEART FAILURE: Status: ACTIVE | Noted: 2021-08-03

## 2023-06-23 NOTE — PROGRESS NOTES
"PCP: Fco Chaparro MD    Referring Provider:     Subjective:   Delma Rae is a 77 y.o. female with hx of CAD/CABG 2004, combined systolic and diastolic heart failure, pulmonary hypertension, RA, DM, type 2, bilateral lower extremity venous insufficiency, polymyalgia rheumatica,  who presents for routine follow-up.  Patient states she is doing well and denies complaints.    02/26/2023 presents for routine follow up. Patient's only complaint is lower ext edema that resolves at HS.She denies chest pain, pressure, heaviness, tightness, orthopnea (sleeps on one pillow), pnd, palpitations, syncope or near syncope. She states that she "walks a little just in her house" but has no issues when she is active. Patient's son reports that Dr. Chaparro put her back on the torsemide and the lasix. I will defer to the PCP.        5/10/2021 Patient's son reports that she was taking both lasix and torsemide until Sunday and ran out of lasix on Sunday. She has had no fluid retention since. We will stop the lasix and continue the torsemide. IF she begins having fluid retention her son is to increase the torsemide to bid dosing and call our office for further instructions.        Fhx:  Family History   Problem Relation Age of Onset    Diabetes Mother         age 60    Hypertension Mother     Diabetes Father     Prostate cancer Father     Cancer Father         Prostate, age 85    Coronary artery disease Sister         age 72    Cancer Brother     Coronary artery disease Brother     Hypertension Son         age 50     Shx:   Social History     Socioeconomic History    Marital status:    Tobacco Use    Smoking status: Never    Smokeless tobacco: Never   Substance and Sexual Activity    Alcohol use: Never    Drug use: Never    Sexual activity: Not Currently     Partners: Male       EKG   2/28/2023 regular sinus rhythm with PACs, heart rate 78 beats per minute, left axis deviation, LVH with QRS widening and repolarization " abnormality, lateral infarct, inferior infarct 4/26/2022 RSR with HR 80 bpm; LVH with repolarization abnormality;   ECHO Results for orders placed during the hospital encounter of 08/03/21    Echo    Interpretation Summary  · The left ventricle is normal in size with moderately decreased systolic function.  · The estimated ejection fraction is 30%.  · Grade I left ventricular diastolic dysfunction.  · Mild right ventricular enlargement with normal right ventricular systolic function.  · Normal central venous pressure (3 mmHg).  · Mild mitral regurgitation.     Heart cath 7/17/2020  Dr. Perez  LAD: Prox LAD 90% stenosis with ABISAI III flow  LCX: mid Lcx- 40% with ABISAI III flow  RCA: distal RCA 35% stenosis  LVEF 35%  Recommendations: optimize medical management     7/2004 Dr. Leena TRIVEDI top the LAD and SVG to the OM1         Lab Results   Component Value Date     04/11/2023    K 4.8 04/11/2023     04/11/2023    CO2 35 (H) 04/11/2023    BUN 38 (H) 04/11/2023    CREATININE 1.71 (H) 04/11/2023    CALCIUM 9.1 04/11/2023    ANIONGAP 7 04/11/2023    ESTGFRAFRICA 58 (L) 09/14/2021    EGFRNONAA 59 07/18/2020       Lab Results   Component Value Date    CHOL 165 01/09/2023     Lab Results   Component Value Date    HDL 66 (H) 01/09/2023     Lab Results   Component Value Date    LDLCALC 81 01/09/2023     Lab Results   Component Value Date    TRIG 92 01/09/2023     Lab Results   Component Value Date    CHOLHDL 2.5 01/09/2023       Lab Results   Component Value Date    WBC 11.00 09/21/2022    HGB 10.7 (L) 09/21/2022    HCT 33.8 (L) 09/21/2022    MCV 76.5 (L) 09/21/2022     09/21/2022           Current Outpatient Medications:     albuterol (PROVENTIL/VENTOLIN HFA) 90 mcg/actuation inhaler, Inhale 2 puffs into the lungs every 4 (four) hours as needed for Wheezing or Shortness of Breath., Disp: 18 g, Rfl: 1    aspirin (ECOTRIN) 81 MG EC tablet, Take 81 mg by mouth once daily., Disp: , Rfl:     atorvastatin  "(LIPITOR) 40 MG tablet, Take 1 tablet (40 mg total) by mouth once daily., Disp: 90 tablet, Rfl: 1    brimonidine 0.2% (ALPHAGAN) 0.2 % Drop, Place 1 drop into both eyes 2 (two) times daily., Disp: , Rfl:     ferrous sulfate 325 (65 FE) MG EC tablet, Take 1 tablet (325 mg total) by mouth once daily., Disp: 90 tablet, Rfl: 1    fluticasone furoate-vilanteroL (BREO ELLIPTA) 100-25 mcg/dose diskus inhaler, Inhale 1 puff into the lungs daily as needed., Disp: , Rfl:     folic acid (FOLVITE) 1 MG tablet, Take 1 mg by mouth once daily., Disp: , Rfl:     hydrALAZINE (APRESOLINE) 25 MG tablet, TAKE ONE TABLET BY MOUTH THREE TIMES DAILY, Disp: 270 tablet, Rfl: 1    hydrOXYchloroQUINE (PLAQUENIL) 200 mg tablet, Take 1 tablet (200 mg total) by mouth once daily., Disp: 90 tablet, Rfl: 1    insulin NPH-insulin regular, 70/30, (NOVOLIN 70/30 U-100 INSULIN) 100 unit/mL (70-30) injection, INJECT 40 UNITS UNDER SKIN IN THE MORNING AND 15 IN THE EVENING., Disp: 50 mL, Rfl: 6    insulin syringe-needle U-100 1 mL 31 gauge x 5/16 Syrg, Inject 1 each into the skin 2 (two) times a day. Dx e11.9, Disp: 200 each, Rfl: 3    insulin syringe-needle U-100 1/2 mL 31 gauge x 15/64" Syrg, by Misc.(Non-Drug; Combo Route) route. Use as directed with insulin, Disp: , Rfl:     lancets Misc, 1 each by skin prick route 4 (four) times daily before meals and nightly., Disp: 200 each, Rfl: 5    latanoprost 0.005 % ophthalmic solution, Place 1 drop into both eyes once daily., Disp: , Rfl:     loratadine (CLARITIN) 10 mg tablet, Take 1 tablet (10 mg total) by mouth once daily., Disp: 90 tablet, Rfl: 1    losartan (COZAAR) 100 MG tablet, Take 1 tablet (100 mg total) by mouth once daily., Disp: 90 tablet, Rfl: 5    methotrexate 2.5 MG Tab, Take 2.5 mg by mouth every 7 days. 5 tablets q7 days, Disp: , Rfl:     multivit-min-FA-lycopen-lutein (CENTRUM SILVER) 0.4 mg-300 mcg- 250 mcg Tab, Take 1 tablet by mouth once daily., Disp: , Rfl:     multivitamin/iron/folic " acid (CENTRUM ULTRA WOMEN'S ORAL), Take 1 tablet by mouth once daily., Disp: , Rfl:     naproxen (NAPROSYN) 500 MG tablet, Take 1 tablet (500 mg total) by mouth 2 (two) times daily as needed., Disp: 20 tablet, Rfl: 1    omeprazole (PRILOSEC) 20 MG capsule, Take 1 capsule (20 mg total) by mouth once daily., Disp: 90 capsule, Rfl: 1    pantoprazole (PROTONIX) 40 MG tablet, Take 40 mg by mouth once daily., Disp: , Rfl:     potassium chloride SA (K-DUR,KLOR-CON M) 10 MEQ tablet, Take 1 tablet (10 mEq total) by mouth 2 (two) times daily., Disp: 60 tablet, Rfl: 3    predniSONE (DELTASONE) 2.5 MG tablet, Take 2.5 mg by mouth once daily at 6am., Disp: , Rfl:     torsemide (DEMADEX) 20 MG Tab, Take 2 tablets (40 mg total) by mouth every morning., Disp: 180 tablet, Rfl: 1    venlafaxine (EFFEXOR-XR) 75 MG 24 hr capsule, Take 1 capsule (75 mg total) by mouth every other day., Disp: 45 capsule, Rfl: 1    zolpidem (AMBIEN) 5 MG Tab, Take 1 tablet (5 mg total) by mouth nightly as needed., Disp: 1 tablet, Rfl: 0    amitriptyline (ELAVIL) 10 MG tablet, Take 1 tablet by mouth every evening., Disp: , Rfl:     amLODIPine (NORVASC) 5 MG tablet, 1 tablet Orally Once a day, Disp: , Rfl:     amoxicillin (AMOXIL) 500 MG capsule, Take 500 mg by mouth 2 (two) times daily., Disp: , Rfl:     blood sugar diagnostic (ONETOUCH ULTRA BLUE TEST STRIP) Strp, 1 strip by Other route 4 (four) times daily before meals and nightly. Dx e11.9 (Patient not taking: Reported on 6/21/2023), Disp: 200 strip, Rfl: 5    dapagliflozin propanediol (FARXIGA) 10 mg tablet, Take 1 tablet (10 mg total) by mouth once daily., Disp: 30 tablet, Rfl: 11    FLUTICASONE PROPIONATE NASL, 1 spray by Each Nostril route daily as needed., Disp: , Rfl:     insulin asp prt-insulin aspart, NovoLOG 70/30, (NOVOLOG 70/30) 100 unit/mL (70-30) Soln, Subcutaneous, Disp: , Rfl:     meloxicam (MOBIC) 7.5 MG tablet, Take 1 tablet (7.5 mg total) by mouth once daily. (Patient not taking:  "Reported on 6/21/2023), Disp: 30 tablet, Rfl: 1    metoprolol succinate (TOPROL-XL) 100 MG 24 hr tablet, Take 1 tablet (100 mg total) by mouth once daily., Disp: 30 tablet, Rfl: 1    pregabalin (LYRICA) 50 MG capsule, Take 1 capsule by mouth 3 (three) times daily., Disp: , Rfl:     triamcinolone acetonide 0.025% (KENALOG) 0.025 % cream, Apply topically 2 (two) times daily., Disp: 454 g, Rfl: 2  Meds reviewed and reconciled.      Review of Systems   Respiratory:  Negative for shortness of breath.    Cardiovascular:  Negative for chest pain, palpitations, orthopnea, claudication, leg swelling and PND.   Neurological:  Negative for dizziness, loss of consciousness and weakness.         Objective:   /62 (BP Location: Left arm, Patient Position: Sitting)   Pulse 70   Ht 5' 7" (1.702 m)   SpO2 (!) 94%   BMI 32.12 kg/m²     Physical Exam  Vitals and nursing note reviewed.   Constitutional:       Appearance: Normal appearance. She is normal weight.      Comments: In a wheelchair   HENT:      Head: Normocephalic and atraumatic.   Neck:      Vascular: No carotid bruit.   Cardiovascular:      Rate and Rhythm: Normal rate and regular rhythm.      Pulses: Normal pulses.      Heart sounds: Normal heart sounds.   Pulmonary:      Effort: Pulmonary effort is normal.      Breath sounds: Normal breath sounds.   Abdominal:      Palpations: Abdomen is soft.   Musculoskeletal:      Cervical back: Neck supple.      Right lower leg: No edema.      Left lower leg: No edema.   Skin:     General: Skin is warm and dry.      Capillary Refill: Capillary refill takes less than 2 seconds.   Neurological:      General: No focal deficit present.      Mental Status: She is alert.         Assessment:     1. Cardiomyopathy, unspecified type  dapagliflozin propanediol (FARXIGA) 10 mg tablet    metoprolol succinate (TOPROL-XL) 100 MG 24 hr tablet      2. Essential hypertension        3. Coronary artery disease involving native coronary artery " of native heart without angina pectoris        4. Chronic combined systolic and diastolic congestive heart failure              Plan:   Essential hypertension  120/62 mmHg    Coronary artery disease  Asymptomatic  Continue aspirin/Lipitor    Chronic combined systolic and diastolic congestive heart failure  Patient is identified as having combined systolic and diastolic heart failure that is chronic. CHF is currently controlled. Latest ECHO performed and demonstrates- Results for orders placed during the hospital encounter of 08/03/21    Echo    Interpretation Summary  · The left ventricle is normal in size with moderately decreased systolic function.  · The estimated ejection fraction is 30%.  · Grade I left ventricular diastolic dysfunction.  · Mild right ventricular enlargement with normal right ventricular systolic function.  · Normal central venous pressure (3 mmHg).  · Mild mitral regurgitation.  Continue losartan 100 mg p.o. daily; torsemide 20 mg 2 tablets daily;  Stop   Furosemide   add Farxiga 10 mg p.o. daily  Change metoprolol tartrate to metoprolol succinate 100 mg p.o. daily  BMP in 2 weeks  Return to clinic in 4 weeks and consider changing losartan to Entresto and adding Aldactone  Repeat echo in 3 months after patient is on maximally tolerated GD MT

## 2023-06-23 NOTE — ASSESSMENT & PLAN NOTE
Patient is identified as having combined systolic and diastolic heart failure that is chronic. CHF is currently controlled. Latest ECHO performed and demonstrates- Results for orders placed during the hospital encounter of 08/03/21    Echo    Interpretation Summary  · The left ventricle is normal in size with moderately decreased systolic function.  · The estimated ejection fraction is 30%.  · Grade I left ventricular diastolic dysfunction.  · Mild right ventricular enlargement with normal right ventricular systolic function.  · Normal central venous pressure (3 mmHg).  · Mild mitral regurgitation.  Continue losartan 100 mg p.o. daily; torsemide 20 mg 2 tablets daily;  Stop   Furosemide   add Farxiga 10 mg p.o. daily  Change metoprolol tartrate to metoprolol succinate 100 mg p.o. daily  BMP in 2 weeks  Return to clinic in 4 weeks and consider changing losartan to Entresto and adding Aldactone  Repeat echo in 3 months after patient is on maximally tolerated GD MT

## 2023-07-18 DIAGNOSIS — Z79.899 OTHER LONG TERM (CURRENT) DRUG THERAPY: Primary | ICD-10-CM

## 2023-07-18 RX ORDER — HYDRALAZINE HYDROCHLORIDE 25 MG/1
TABLET, FILM COATED ORAL
Qty: 270 TABLET | Refills: 1 | OUTPATIENT
Start: 2023-07-18

## 2023-07-19 RX ORDER — HYDRALAZINE HYDROCHLORIDE 25 MG/1
25 TABLET, FILM COATED ORAL 3 TIMES DAILY
Qty: 270 TABLET | Refills: 1 | Status: SHIPPED | OUTPATIENT
Start: 2023-07-19 | End: 2023-10-20 | Stop reason: SDUPTHER

## 2023-07-19 RX ORDER — POTASSIUM CHLORIDE 750 MG/1
10 TABLET, EXTENDED RELEASE ORAL 2 TIMES DAILY
Qty: 60 TABLET | Refills: 3 | Status: SHIPPED | OUTPATIENT
Start: 2023-07-19 | End: 2023-11-02 | Stop reason: SDUPTHER

## 2023-07-24 ENCOUNTER — OFFICE VISIT (OUTPATIENT)
Dept: WOUND CARE | Facility: CLINIC | Age: 78
End: 2023-07-24
Attending: FAMILY MEDICINE
Payer: COMMERCIAL

## 2023-07-24 VITALS — SYSTOLIC BLOOD PRESSURE: 136 MMHG | RESPIRATION RATE: 20 BRPM | HEART RATE: 62 BPM | DIASTOLIC BLOOD PRESSURE: 70 MMHG

## 2023-07-24 DIAGNOSIS — I73.9 PVD (PERIPHERAL VASCULAR DISEASE): ICD-10-CM

## 2023-07-24 DIAGNOSIS — I83.019 VENOUS ULCER OF RIGHT LEG: Primary | ICD-10-CM

## 2023-07-24 DIAGNOSIS — L97.919 VENOUS ULCER OF RIGHT LEG: Primary | ICD-10-CM

## 2023-07-24 PROCEDURE — 99213 OFFICE O/P EST LOW 20 MIN: CPT | Mod: S$PBB,,, | Performed by: NURSE PRACTITIONER

## 2023-07-24 PROCEDURE — 1160F PR REVIEW ALL MEDS BY PRESCRIBER/CLIN PHARMACIST DOCUMENTED: ICD-10-PCS | Mod: CPTII,,, | Performed by: NURSE PRACTITIONER

## 2023-07-24 PROCEDURE — 3075F SYST BP GE 130 - 139MM HG: CPT | Mod: CPTII,,, | Performed by: NURSE PRACTITIONER

## 2023-07-24 PROCEDURE — 1160F RVW MEDS BY RX/DR IN RCRD: CPT | Mod: CPTII,,, | Performed by: NURSE PRACTITIONER

## 2023-07-24 PROCEDURE — 3044F PR MOST RECENT HEMOGLOBIN A1C LEVEL <7.0%: ICD-10-PCS | Mod: CPTII,,, | Performed by: NURSE PRACTITIONER

## 2023-07-24 PROCEDURE — 99213 PR OFFICE/OUTPT VISIT, EST, LEVL III, 20-29 MIN: ICD-10-PCS | Mod: S$PBB,,, | Performed by: NURSE PRACTITIONER

## 2023-07-24 PROCEDURE — 1126F AMNT PAIN NOTED NONE PRSNT: CPT | Mod: CPTII,,, | Performed by: NURSE PRACTITIONER

## 2023-07-24 PROCEDURE — 3075F PR MOST RECENT SYSTOLIC BLOOD PRESS GE 130-139MM HG: ICD-10-PCS | Mod: CPTII,,, | Performed by: NURSE PRACTITIONER

## 2023-07-24 PROCEDURE — 3078F PR MOST RECENT DIASTOLIC BLOOD PRESSURE < 80 MM HG: ICD-10-PCS | Mod: CPTII,,, | Performed by: NURSE PRACTITIONER

## 2023-07-24 PROCEDURE — 1159F MED LIST DOCD IN RCRD: CPT | Mod: CPTII,,, | Performed by: NURSE PRACTITIONER

## 2023-07-24 PROCEDURE — 3044F HG A1C LEVEL LT 7.0%: CPT | Mod: CPTII,,, | Performed by: NURSE PRACTITIONER

## 2023-07-24 PROCEDURE — 1126F PR PAIN SEVERITY QUANTIFIED, NO PAIN PRESENT: ICD-10-PCS | Mod: CPTII,,, | Performed by: NURSE PRACTITIONER

## 2023-07-24 PROCEDURE — 1159F PR MEDICATION LIST DOCUMENTED IN MEDICAL RECORD: ICD-10-PCS | Mod: CPTII,,, | Performed by: NURSE PRACTITIONER

## 2023-07-24 PROCEDURE — 99215 OFFICE O/P EST HI 40 MIN: CPT | Mod: PBBFAC | Performed by: NURSE PRACTITIONER

## 2023-07-24 PROCEDURE — 3078F DIAST BP <80 MM HG: CPT | Mod: CPTII,,, | Performed by: NURSE PRACTITIONER

## 2023-07-24 RX ORDER — PREDNISONE 10 MG/1
TABLET ORAL
Qty: 74 TABLET | Refills: 0 | Status: SHIPPED | OUTPATIENT
Start: 2023-07-24 | End: 2023-09-07 | Stop reason: SDUPTHER

## 2023-07-24 NOTE — ASSESSMENT & PLAN NOTE
Clean with baby shampoo and water  Apply kenalog cream to wounds,cover with mepitel, then Aquacel Ag, wrap with kerlix and ace wrap or sure press from toes to just below knee lightly  Change every 2 days and as needed  Apply large edema wear to left leg  Elevate legs as much as possible  Avoid prolonged standing  Monitor closely for s/s of infection including fever, chills, increase in pain, odor from wound, and increased redness from foot. Go to ER if any complications develop.   Keep leg elevated and avoid pressure on wound.  Diabetes:  Monitor glucose closely. Check fasting glucose and 2 hours after meals. HgA1C goal <7, fasting glucose , and 2 hours after meals <180  Hypertension:  Check blood pressure twice daily, goal <120/80  Diet:   Increase protein intake, avoid fried, fatty foods and foods high in simple carbs.   Vitamins:  Take vitamin C 1000 mg, zinc 50mg, vitamin d 5000 units, and a daily multivitamin. Ernie is a good source of protein and nutrients to aid in wound healing.

## 2023-07-24 NOTE — PROGRESS NOTES
MYA Escobar   [facility]  [address] [facility] [place] [address]  [phone]      PATIENT NAME: Delma Rae  : 1945  DATE: 23  MRN: [id number]      Billing Provider: MYA Escobar  Level of Service:   Patient PCP Information       Provider PCP Type    Fco Chaparro MD General            Reason for Visit / Chief Complaint: Non-healing Wound Follow Up (RLE)       History of Present Illness / Problem Focused Workflow     Delma Rae is a 77 y.o. female who presents to clinic for follow up on chronic-non healing wound on the right lower leg. Wound is larger today. Wound had previously healed with prednisone taper. She has appointment with new Rheumatologist in September. 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 Initiate prednisone taper with mepitel and aquacel ag.     Arterial doppler 2022, No areas of occlusion. No doubling of peak systolic velocity between adjacent stations to specifically suggest high-grade stenosis of a focal nature. Ankle brachial indices measure normal at 1.27 right and 1.19 left.     Significant PMH of CHF, COPD, hypertension, MI, rheumatoid arthritis, hyperlipidemia, GERD, and diabetes. Last HgA1C 7 in , diabetes managed by PCP.  Pertinent factors that delay wound healing include multiple co-morbidities, poor vascular supply, diabetes, edema, heavy drainage, excessive wound moisture and macerated tissue, large surface area, advanced age and fragile skin. Denies fever and chills.      Review of Systems     Review of Systems   Constitutional:  Positive for activity change. Negative for chills and fever.   Respiratory:  Negative for chest tightness and shortness of breath.    Cardiovascular:  Positive for leg swelling. Negative for chest pain and palpitations.   Musculoskeletal:  Positive for arthralgias and joint swelling.   Skin:  Positive for wound.        See wound  assessment   Neurological:  Positive for weakness and numbness.   Psychiatric/Behavioral:  Negative for agitation, behavioral problems, confusion and self-injury.      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 Chaidez    GERD (gastroesophageal reflux disease)     HHD (hypertensive heart disease)     Hyperlipidemia     Hypertension     MI (myocardial infarction)     OA (osteoarthritis)     Rheumatoid arthritis        Past Surgical History:   Procedure Laterality Date    HYSTERECTOMY      OOPHORECTOMY      VAGINAL DELIVERY      x 1       Social History  Ms. Delma Rae  reports that she has never smoked. She has never used smokeless tobacco. She reports that she does not drink alcohol and does not use drugs.    Family History  Ms.'s Delma Rae family history includes Cancer in her brother and father; Coronary artery disease in her brother and sister; Diabetes in her father and mother; Hypertension in her mother and son; Prostate cancer in her father.    Medications and Allergies     Medications  Outpatient Medications Marked as Taking for the 7/24/23 encounter (Office Visit) with MYA Escobar   Medication Sig Dispense Refill    albuterol (PROVENTIL/VENTOLIN HFA) 90 mcg/actuation inhaler Inhale 2 puffs into the lungs every 4 (four) hours as needed for Wheezing or Shortness of Breath. 18 g 1    amitriptyline (ELAVIL) 10 MG tablet Take 1 tablet by mouth every evening.      amLODIPine (NORVASC) 5 MG tablet 1 tablet Orally Once a day      amoxicillin (AMOXIL) 500 MG capsule Take 500 mg by mouth 2 (two) times daily.      aspirin (ECOTRIN) 81 MG EC tablet  "Take 81 mg by mouth once daily.      atorvastatin (LIPITOR) 40 MG tablet Take 1 tablet (40 mg total) by mouth once daily. 90 tablet 1    brimonidine 0.2% (ALPHAGAN) 0.2 % Drop Place 1 drop into both eyes 2 (two) times daily.      dapagliflozin propanediol (FARXIGA) 10 mg tablet Take 1 tablet (10 mg total) by mouth once daily. 30 tablet 11    ferrous sulfate 325 (65 FE) MG EC tablet Take 1 tablet (325 mg total) by mouth once daily. 90 tablet 1    fluticasone furoate-vilanteroL (BREO ELLIPTA) 100-25 mcg/dose diskus inhaler Inhale 1 puff into the lungs daily as needed.      FLUTICASONE PROPIONATE NASL 1 spray by Each Nostril route daily as needed.      folic acid (FOLVITE) 1 MG tablet Take 1 mg by mouth once daily.      hydrALAZINE (APRESOLINE) 25 MG tablet Take 1 tablet (25 mg total) by mouth 3 (three) times daily. 270 tablet 1    hydrOXYchloroQUINE (PLAQUENIL) 200 mg tablet Take 1 tablet (200 mg total) by mouth once daily. 90 tablet 1    insulin asp prt-insulin aspart, NovoLOG 70/30, (NOVOLOG 70/30) 100 unit/mL (70-30) Soln Subcutaneous      insulin NPH-insulin regular, 70/30, (NOVOLIN 70/30 U-100 INSULIN) 100 unit/mL (70-30) injection INJECT 40 UNITS UNDER SKIN IN THE MORNING AND 15 IN THE EVENING. 50 mL 6    insulin syringe-needle U-100 1 mL 31 gauge x 5/16 Syrg Inject 1 each into the skin 2 (two) times a day. Dx e11.9 200 each 3    insulin syringe-needle U-100 1/2 mL 31 gauge x 15/64" Syrg by Misc.(Non-Drug; Combo Route) route. Use as directed with insulin      lancets Misc 1 each by skin prick route 4 (four) times daily before meals and nightly. 200 each 5    latanoprost 0.005 % ophthalmic solution Place 1 drop into both eyes once daily.      loratadine (CLARITIN) 10 mg tablet Take 1 tablet (10 mg total) by mouth once daily. 90 tablet 1    losartan (COZAAR) 100 MG tablet Take 1 tablet (100 mg total) by mouth once daily. 90 tablet 5    methotrexate 2.5 MG Tab Take 2.5 mg by mouth every 7 days. 5 " tablets q7 days      metoprolol succinate (TOPROL-XL) 100 MG 24 hr tablet Take 1 tablet (100 mg total) by mouth once daily. 30 tablet 1    multivit-min-FA-lycopen-lutein (CENTRUM SILVER) 0.4 mg-300 mcg- 250 mcg Tab Take 1 tablet by mouth once daily.      naproxen (NAPROSYN) 500 MG tablet Take 1 tablet (500 mg total) by mouth 2 (two) times daily as needed. 20 tablet 1    omeprazole (PRILOSEC) 20 MG capsule Take 1 capsule (20 mg total) by mouth once daily. 90 capsule 1    pantoprazole (PROTONIX) 40 MG tablet Take 40 mg by mouth once daily.      potassium chloride SA (K-DUR,KLOR-CON M) 10 MEQ tablet Take 1 tablet (10 mEq total) by mouth 2 (two) times daily. 60 tablet 3    predniSONE (DELTASONE) 2.5 MG tablet Take 2.5 mg by mouth once daily at 6am.      pregabalin (LYRICA) 50 MG capsule Take 1 capsule by mouth 3 (three) times daily.      torsemide (DEMADEX) 20 MG Tab Take 2 tablets (40 mg total) by mouth every morning. 180 tablet 1    triamcinolone acetonide 0.025% (KENALOG) 0.025 % cream Apply topically 2 (two) times daily. 454 g 2    venlafaxine (EFFEXOR-XR) 75 MG 24 hr capsule Take 1 capsule (75 mg total) by mouth every other day. 45 capsule 1    zolpidem (AMBIEN) 5 MG Tab Take 1 tablet (5 mg total) by mouth nightly as needed. 1 tablet 0       Allergies  Review of patient's allergies indicates:  No Known Allergies    Physical Examination     Vitals:    07/24/23 1337   BP: 136/70   Pulse: 62   Resp: 20     Physical Exam  Vitals and nursing note reviewed.   Constitutional:       Appearance: Normal appearance.   HENT:      Head: Normocephalic.   Cardiovascular:      Rate and Rhythm: Normal rate and regular rhythm.      Pulses: Normal pulses.      Heart sounds: Normal heart sounds.   Pulmonary:      Effort: Pulmonary effort is normal. No respiratory distress.   Chest:      Chest wall: No tenderness.   Musculoskeletal:         General: Swelling and tenderness present.      Right lower leg: Edema present.       Left lower leg: Edema present.   Skin:     General: Skin is warm and dry.      Capillary Refill: Capillary refill takes 2 to 3 seconds.      Findings: Erythema present.      Comments: See LDA for photo and measurements   Neurological:      General: No focal deficit present.      Mental Status: She is alert and oriented to person, place, and time. Mental status is at baseline.   Psychiatric:         Mood and Affect: Mood normal.         Behavior: Behavior normal.         Thought Content: Thought content normal.         Judgment: Judgment normal.     Assessment and Plan             Wound 09/27/21 Right lower;lateral Leg #3 (Active)   09/27/21     Pre-existing:    Primary Wound Type:    Side: Right   Orientation: lower;lateral   Location: Leg   Wound Number: #3   Ankle-Brachial Index:    Pulses:    Removal Indication and Assessment:    Wound Outcome:    (Retired) Wound Type:    (Retired) Wound Length (cm):    (Retired) Wound Width (cm):    (Retired) Depth (cm):    Wound Description (Comments):    Removal Indications:    Wound Image    07/24/23 1342   Dressing Appearance Moist drainage 07/24/23 1342   Drainage Amount Moderate 07/24/23 1342   Drainage Characteristics/Odor Serosanguineous 07/24/23 1342   Appearance Pink;Moist;Granulating 07/24/23 1342   Tissue loss description Full thickness 07/24/23 1342   Black (%), Wound Tissue Color 0 % 07/24/23 1342   Red (%), Wound Tissue Color 100 % 07/24/23 1342   Yellow (%), Wound Tissue Color 0 % 07/24/23 1342   Periwound Area Intact;Moist 07/24/23 1342   Wound Edges Open 07/24/23 1342   Wound Length (cm) 4 cm 07/24/23 1342   Wound Width (cm) 7 cm 07/24/23 1342   Wound Depth (cm) 0.2 cm 07/24/23 1342   Wound Volume (cm^3) 5.6 cm^3 07/24/23 1342   Wound Surface Area (cm^2) 28 cm^2 07/24/23 1342   Care Cleansed with:;Antimicrobial agent;Soap and water 07/24/23 1342   Dressing Applied;Silicone;Hydrofiber;Gauze;Rolled gauze 07/24/23 1342   Periwound Care Moisture barrier applied  07/24/23 1342   Dressing Change Due 07/25/23 07/24/23 1342       [REMOVED]      Altered Skin Integrity 11/03/22 Right posterior Calf #2 Venous Ulcer Full thickness tissue loss. Subcutaneous fat may be visible but bone, tendon or muscle are not exposed (Removed)   11/03/22    Altered Skin Integrity Present on Admission - Did Patient arrive to the hospital with altered skin?: yes   Side: Right   Orientation: posterior   Location: Calf   Wound Number: #2   Is this injury device related?: No   Primary Wound Type: Venous ulcer   Description of Altered Skin Integrity: Full thickness tissue loss. Subcutaneous fat may be visible but bone, tendon or muscle are not exposed   Ankle-Brachial Index:    Pulses:    Removal Indication and Assessment: closed/resurfaced   Wound Outcome: Healed   (Retired) Wound Length (cm):    (Retired) Wound Width (cm):    (Retired) Depth (cm):    Wound Description (Comments):    Removal Indications:    Removed 07/24/23 1347   Wound Image   07/24/23 1346   Description of Altered Skin Integrity Intact skin with non-blanchable redness of localized area 07/24/23 1346   Dressing Appearance Dry;Intact;Clean 07/24/23 1346   Drainage Amount None 07/24/23 1346   Appearance Intact 07/24/23 1346   Tissue loss description Not applicable 07/24/23 1346   Black (%), Wound Tissue Color 0 % 07/24/23 1346   Red (%), Wound Tissue Color 0 % 07/24/23 1346   Yellow (%), Wound Tissue Color 0 % 07/24/23 1346   Periwound Area Intact;Dry 07/24/23 1346   Wound Edges Rolled/closed 07/24/23 1346   Wound Length (cm) 0 cm 07/24/23 1346   Wound Width (cm) 0 cm 07/24/23 1346   Wound Depth (cm) 0 cm 07/24/23 1346   Wound Volume (cm^3) 0 cm^3 07/24/23 1346   Wound Surface Area (cm^2) 0 cm^2 07/24/23 1346   Care Cleansed with:;Antimicrobial agent;Soap and water 07/24/23 1346       [REMOVED]      Altered Skin Integrity 01/09/23 1328 Right anterior;lower;proximal Leg (Removed)   01/09/23 1328   Altered Skin Integrity Present on  Admission - Did Patient arrive to the hospital with altered skin?:    Side: Right   Orientation: anterior;lower;proximal   Location: Leg   Wound Number:    Is this injury device related?:    Primary Wound Type:    Description of Altered Skin Integrity:    Ankle-Brachial Index:    Pulses:    Removal Indication and Assessment: closed/resurfaced   Wound Outcome: Healed   (Retired) Wound Length (cm):    (Retired) Wound Width (cm):    (Retired) Depth (cm):    Wound Description (Comments):    Removal Indications:    Removed 07/24/23 1342   Wound Image    07/24/23 1340   Description of Altered Skin Integrity Intact skin with non-blanchable redness of localized area 07/24/23 1340   Dressing Appearance Dry;Intact;Clean 07/24/23 1340   Drainage Amount None 07/24/23 1340   Appearance Intact;Pink 07/24/23 1340   Tissue loss description Not applicable 07/24/23 1340   Black (%), Wound Tissue Color 0 % 07/24/23 1340   Red (%), Wound Tissue Color 0 % 07/24/23 1340   Yellow (%), Wound Tissue Color 0 % 07/24/23 1340   Periwound Area Intact;Dry 07/24/23 1340   Wound Edges Rolled/closed 07/24/23 1340   Wound Length (cm) 0 cm 07/24/23 1340   Wound Width (cm) 0 cm 07/24/23 1340   Wound Depth (cm) 0 cm 07/24/23 1340   Wound Volume (cm^3) 0 cm^3 07/24/23 1340   Wound Surface Area (cm^2) 0 cm^2 07/24/23 1340       [REMOVED]      Altered Skin Integrity 03/06/23 Right anterior Foot #4 Blister(s) Partial thickness tissue loss. Shallow open ulcer with a red or pink wound bed, without slough. Intact or Open/Ruptured Serum-filled blister. (Removed)   03/06/23    Altered Skin Integrity Present on Admission - Did Patient arrive to the hospital with altered skin?:    Side: Right   Orientation: anterior   Location: Foot   Wound Number: #4   Is this injury device related?: No   Primary Wound Type: Blister(s)   Description of Altered Skin Integrity: Partial thickness tissue loss. Shallow open ulcer with a red or pink wound bed, without slough. Intact  or Open/Ruptured Serum-filled blister.   Ankle-Brachial Index:    Pulses:    Removal Indication and Assessment: closed/resurfaced   Wound Outcome: Healed   (Retired) Wound Length (cm):    (Retired) Wound Width (cm):    (Retired) Depth (cm):    Wound Description (Comments):    Removal Indications:    Removed 07/24/23 1345   Wound Image   07/24/23 1345   Description of Altered Skin Integrity Intact skin with non-blanchable redness of localized area 07/24/23 1345   Dressing Appearance Open to air 07/24/23 1345   Drainage Amount None 07/24/23 1345   Appearance Intact 07/24/23 1345   Tissue loss description Not applicable 07/24/23 1345   Black (%), Wound Tissue Color 0 % 07/24/23 1345   Red (%), Wound Tissue Color 0 % 07/24/23 1345   Yellow (%), Wound Tissue Color 0 % 07/24/23 1345   Wound Edges Rolled/closed 07/24/23 1345       [REMOVED]      Altered Skin Integrity 04/11/23 1348 Right lateral Foot (Removed)   04/11/23 1348   Altered Skin Integrity Present on Admission - Did Patient arrive to the hospital with altered skin?:    Side: Right   Orientation: lateral   Location: Foot   Wound Number:    Is this injury device related?:    Primary Wound Type:    Description of Altered Skin Integrity:    Ankle-Brachial Index:    Pulses:    Removal Indication and Assessment: closed/resurfaced   Wound Outcome: Healed   (Retired) Wound Length (cm):    (Retired) Wound Width (cm):    (Retired) Depth (cm):    Wound Description (Comments):    Removal Indications:    Removed 07/24/23 1345   Wound Image   07/24/23 1344   Description of Altered Skin Integrity Intact skin with non-blanchable redness of localized area 07/24/23 1344   Drainage Amount None 07/24/23 1344   Appearance Intact 07/24/23 1344   Tissue loss description Not applicable 07/24/23 1344   Black (%), Wound Tissue Color 0 % 07/24/23 1344   Red (%), Wound Tissue Color 0 % 07/24/23 1344   Yellow (%), Wound Tissue Color 0 % 07/24/23 1344   Periwound Area Intact;Dry 07/24/23  1344   Wound Length (cm) 0 cm 07/24/23 1344   Wound Width (cm) 0 cm 07/24/23 1344   Wound Depth (cm) 0 cm 07/24/23 1344   Wound Volume (cm^3) 0 cm^3 07/24/23 1344   Wound Surface Area (cm^2) 0 cm^2 07/24/23 1344     Problem List Items Addressed This Visit          Cardiac/Vascular    PVD (peripheral vascular disease)    Overview     Last arterial doppler 9/2021  Ankle brachial indices are 1.24 on the right and 1.2 on the left.            Orthopedic    Venous ulcer of right leg - Primary    Overview                                                      Current Assessment & Plan     Clean with baby shampoo and water  Apply kenalog cream to wounds,cover with mepitel, then Aquacel Ag, wrap with kerlix and ace wrap or sure press from toes to just below knee lightly  Change every 2 days and as needed  Apply large edema wear to left leg  Elevate legs as much as possible  Avoid prolonged standing  Monitor closely for s/s of infection including fever, chills, increase in pain, odor from wound, and increased redness from foot. Go to ER if any complications develop.   Keep leg elevated and avoid pressure on wound.  Diabetes:  Monitor glucose closely. Check fasting glucose and 2 hours after meals. HgA1C goal <7, fasting glucose , and 2 hours after meals <180  Hypertension:  Check blood pressure twice daily, goal <120/80  Diet:   Increase protein intake, avoid fried, fatty foods and foods high in simple carbs.   Vitamins:  Take vitamin C 1000 mg, zinc 50mg, vitamin d 5000 units, and a daily multivitamin. Ernie is a good source of protein and nutrients to aid in wound healing.             Future Appointments   Date Time Provider Department Center   8/1/2023  2:45 PM CRISTINA Smith [facility]   8/21/2023  1:00 PM [facility]    9/11/2023 11:30 AM Anthony Sorto MD [facility]   1/12/2024  3:00 PM AWV NURSE, [facility] FAMILY MEDICINE [facility] [place]             Signature:  MYA Escobar  [facility]  [address] [facility] [address]  [phone]    Date of encounter: 7/24/23

## 2023-08-01 ENCOUNTER — OFFICE VISIT (OUTPATIENT)
Dept: CARDIOLOGY | Facility: CLINIC | Age: 78
End: 2023-08-01
Payer: COMMERCIAL

## 2023-08-01 VITALS — BODY MASS INDEX: 32.12 KG/M2 | HEART RATE: 75 BPM | HEIGHT: 67 IN | OXYGEN SATURATION: 98 %

## 2023-08-01 DIAGNOSIS — I10 ESSENTIAL HYPERTENSION: Chronic | ICD-10-CM

## 2023-08-01 DIAGNOSIS — I50.42 CHRONIC COMBINED SYSTOLIC AND DIASTOLIC CONGESTIVE HEART FAILURE: ICD-10-CM

## 2023-08-01 DIAGNOSIS — I25.10 CORONARY ARTERY DISEASE INVOLVING NATIVE CORONARY ARTERY OF NATIVE HEART WITHOUT ANGINA PECTORIS: ICD-10-CM

## 2023-08-01 DIAGNOSIS — I42.9 CARDIOMYOPATHY, UNSPECIFIED TYPE: Primary | ICD-10-CM

## 2023-08-01 PROCEDURE — 3044F HG A1C LEVEL LT 7.0%: CPT | Mod: CPTII,,, | Performed by: NURSE PRACTITIONER

## 2023-08-01 PROCEDURE — 1159F PR MEDICATION LIST DOCUMENTED IN MEDICAL RECORD: ICD-10-PCS | Mod: CPTII,,, | Performed by: NURSE PRACTITIONER

## 2023-08-01 PROCEDURE — 99215 OFFICE O/P EST HI 40 MIN: CPT | Mod: PBBFAC | Performed by: NURSE PRACTITIONER

## 2023-08-01 PROCEDURE — 1159F MED LIST DOCD IN RCRD: CPT | Mod: CPTII,,, | Performed by: NURSE PRACTITIONER

## 2023-08-01 PROCEDURE — 1160F RVW MEDS BY RX/DR IN RCRD: CPT | Mod: CPTII,,, | Performed by: NURSE PRACTITIONER

## 2023-08-01 PROCEDURE — 99214 OFFICE O/P EST MOD 30 MIN: CPT | Mod: S$PBB,,, | Performed by: NURSE PRACTITIONER

## 2023-08-01 PROCEDURE — 3044F PR MOST RECENT HEMOGLOBIN A1C LEVEL <7.0%: ICD-10-PCS | Mod: CPTII,,, | Performed by: NURSE PRACTITIONER

## 2023-08-01 PROCEDURE — 1160F PR REVIEW ALL MEDS BY PRESCRIBER/CLIN PHARMACIST DOCUMENTED: ICD-10-PCS | Mod: CPTII,,, | Performed by: NURSE PRACTITIONER

## 2023-08-01 PROCEDURE — 99214 PR OFFICE/OUTPT VISIT, EST, LEVL IV, 30-39 MIN: ICD-10-PCS | Mod: S$PBB,,, | Performed by: NURSE PRACTITIONER

## 2023-08-01 NOTE — PROGRESS NOTES
"PCP: cFo Chaparro MD    Referring Provider:     Subjective:   Delma Rae is a 77 y.o. female with hx of CAD/CABG 2004, combined systolic and diastolic heart failure, pulmonary hypertension, RA, DM, type 2, bilateral lower extremity venous insufficiency, polymyalgia rheumatica,  who presents for 4 week follow up. She has tolerated the Farxiga and metoprolol without issues. She denies SOB and sleeps flat on no pillows.    6/21/2023 presents for routine follow-up.  Patient states she is doing well and denies complaints.    02/26/2023 presents for routine follow up. Patient's only complaint is lower ext edema that resolves at HS.She denies chest pain, pressure, heaviness, tightness, orthopnea (sleeps on one pillow), pnd, palpitations, syncope or near syncope. She states that she "walks a little just in her house" but has no issues when she is active. Patient's son reports that Dr. Chaparro put her back on the torsemide and the lasix. I will defer to the PCP.        5/10/2021 Patient's son reports that she was taking both lasix and torsemide until Sunday and ran out of lasix on Sunday. She has had no fluid retention since. We will stop the lasix and continue the torsemide. IF she begins having fluid retention her son is to increase the torsemide to bid dosing and call our office for further instructions.        Fhx:  Family History   Problem Relation Age of Onset    Diabetes Mother         age 60    Hypertension Mother     Diabetes Father     Prostate cancer Father     Cancer Father         Prostate, age 85    Coronary artery disease Sister         age 72    Cancer Brother     Coronary artery disease Brother     Hypertension Son         age 50     Shx:   Social History     Socioeconomic History    Marital status:    Tobacco Use    Smoking status: Never    Smokeless tobacco: Never   Substance and Sexual Activity    Alcohol use: Never    Drug use: Never    Sexual activity: Not Currently     Partners: Male "       EKG   2/28/2023 regular sinus rhythm with PACs, heart rate 78 beats per minute, left axis deviation, LVH with QRS widening and repolarization abnormality, lateral infarct, inferior infarct 4/26/2022 RSR with HR 80 bpm; LVH with repolarization abnormality;   ECHO Results for orders placed during the hospital encounter of 08/03/21    Echo    Interpretation Summary  · The left ventricle is normal in size with moderately decreased systolic function.  · The estimated ejection fraction is 30%.  · Grade I left ventricular diastolic dysfunction.  · Mild right ventricular enlargement with normal right ventricular systolic function.  · Normal central venous pressure (3 mmHg).  · Mild mitral regurgitation.     Heart cath 7/17/2020  Dr. Perez  LAD: Prox LAD 90% stenosis with ABISAI III flow  LCX: mid Lcx- 40% with ABISAI III flow  RCA: distal RCA 35% stenosis  LVEF 35%  Recommendations: optimize medical management     7/2004 Dr. Leena TRIVEDI top the LAD and SVG to the OM1         Lab Results   Component Value Date     07/24/2023    K 3.8 07/24/2023     07/24/2023    CO2 37 (H) 07/24/2023    BUN 23 (H) 07/24/2023    CREATININE 1.38 (H) 07/24/2023    CALCIUM 9.4 07/24/2023    ANIONGAP 7 07/24/2023    ESTGFRAFRICA 58 (L) 09/14/2021    EGFRNONAA 59 07/18/2020       Lab Results   Component Value Date    CHOL 165 01/09/2023     Lab Results   Component Value Date    HDL 66 (H) 01/09/2023     Lab Results   Component Value Date    LDLCALC 81 01/09/2023     Lab Results   Component Value Date    TRIG 92 01/09/2023     Lab Results   Component Value Date    CHOLHDL 2.5 01/09/2023       Lab Results   Component Value Date    WBC 11.00 09/21/2022    HGB 10.7 (L) 09/21/2022    HCT 33.8 (L) 09/21/2022    MCV 76.5 (L) 09/21/2022     09/21/2022           Current Outpatient Medications:     aspirin (ECOTRIN) 81 MG EC tablet, Take 81 mg by mouth once daily., Disp: , Rfl:     atorvastatin (LIPITOR) 40 MG tablet, Take 1 tablet  "(40 mg total) by mouth once daily., Disp: 90 tablet, Rfl: 1    dapagliflozin propanediol (FARXIGA) 10 mg tablet, Take 1 tablet (10 mg total) by mouth once daily., Disp: 30 tablet, Rfl: 11    ferrous sulfate 325 (65 FE) MG EC tablet, Take 1 tablet (325 mg total) by mouth once daily., Disp: 90 tablet, Rfl: 1    fluticasone furoate-vilanteroL (BREO ELLIPTA) 100-25 mcg/dose diskus inhaler, Inhale 1 puff into the lungs daily as needed., Disp: , Rfl:     folic acid (FOLVITE) 1 MG tablet, Take 1 mg by mouth once daily., Disp: , Rfl:     hydrALAZINE (APRESOLINE) 25 MG tablet, Take 1 tablet (25 mg total) by mouth 3 (three) times daily., Disp: 270 tablet, Rfl: 1    hydrOXYchloroQUINE (PLAQUENIL) 200 mg tablet, Take 1 tablet (200 mg total) by mouth once daily., Disp: 90 tablet, Rfl: 1    insulin asp prt-insulin aspart, NovoLOG 70/30, (NOVOLOG 70/30) 100 unit/mL (70-30) Soln, Subcutaneous, Disp: , Rfl:     insulin NPH-insulin regular, 70/30, (NOVOLIN 70/30 U-100 INSULIN) 100 unit/mL (70-30) injection, INJECT 40 UNITS UNDER SKIN IN THE MORNING AND 15 IN THE EVENING., Disp: 50 mL, Rfl: 6    insulin syringe-needle U-100 1 mL 31 gauge x 5/16 Syrg, Inject 1 each into the skin 2 (two) times a day. Dx e11.9, Disp: 200 each, Rfl: 3    insulin syringe-needle U-100 1/2 mL 31 gauge x 15/64" Syrg, by Misc.(Non-Drug; Combo Route) route. Use as directed with insulin, Disp: , Rfl:     lancets Misc, 1 each by skin prick route 4 (four) times daily before meals and nightly., Disp: 200 each, Rfl: 5    losartan (COZAAR) 100 MG tablet, Take 1 tablet (100 mg total) by mouth once daily., Disp: 90 tablet, Rfl: 5    methotrexate 2.5 MG Tab, Take 2.5 mg by mouth every 7 days. 5 tablets q7 days, Disp: , Rfl:     metoprolol succinate (TOPROL-XL) 100 MG 24 hr tablet, Take 1 tablet (100 mg total) by mouth once daily., Disp: 30 tablet, Rfl: 1    naproxen (NAPROSYN) 500 MG tablet, Take 1 tablet (500 mg total) by mouth 2 (two) times daily as needed., Disp: 20 " tablet, Rfl: 1    omeprazole (PRILOSEC) 20 MG capsule, Take 1 capsule (20 mg total) by mouth once daily., Disp: 90 capsule, Rfl: 1    pantoprazole (PROTONIX) 40 MG tablet, Take 40 mg by mouth once daily., Disp: , Rfl:     potassium chloride SA (K-DUR,KLOR-CON M) 10 MEQ tablet, Take 1 tablet (10 mEq total) by mouth 2 (two) times daily., Disp: 60 tablet, Rfl: 3    predniSONE (DELTASONE) 10 MG tablet, Take 4 tablets (40 mg total) by mouth once daily for 7 days, THEN 3 tablets (30 mg total) once daily for 7 days, THEN 2 tablets (20 mg total) once daily for 7 days, THEN 1 tablet (10 mg total) once daily for 7 days, THEN 0.5 tablets (5 mg total) once daily for 7 days., Disp: 74 tablet, Rfl: 0    predniSONE (DELTASONE) 2.5 MG tablet, Take 2.5 mg by mouth once daily at 6am., Disp: , Rfl:     torsemide (DEMADEX) 20 MG Tab, Take 2 tablets (40 mg total) by mouth every morning., Disp: 180 tablet, Rfl: 1    venlafaxine (EFFEXOR-XR) 75 MG 24 hr capsule, Take 1 capsule (75 mg total) by mouth every other day., Disp: 45 capsule, Rfl: 1    zolpidem (AMBIEN) 5 MG Tab, Take 1 tablet (5 mg total) by mouth nightly as needed., Disp: 1 tablet, Rfl: 0    albuterol (PROVENTIL/VENTOLIN HFA) 90 mcg/actuation inhaler, Inhale 2 puffs into the lungs every 4 (four) hours as needed for Wheezing or Shortness of Breath. (Patient not taking: Reported on 8/1/2023), Disp: 18 g, Rfl: 1    amitriptyline (ELAVIL) 10 MG tablet, Take 1 tablet by mouth every evening., Disp: , Rfl:     amLODIPine (NORVASC) 5 MG tablet, 1 tablet Orally Once a day, Disp: , Rfl:     amoxicillin (AMOXIL) 500 MG capsule, Take 500 mg by mouth 2 (two) times daily., Disp: , Rfl:     blood sugar diagnostic (ONETOUCH ULTRA BLUE TEST STRIP) Strp, 1 strip by Other route 4 (four) times daily before meals and nightly. Dx e11.9 (Patient not taking: Reported on 6/21/2023), Disp: 200 strip, Rfl: 5    brimonidine 0.2% (ALPHAGAN) 0.2 % Drop, Place 1 drop into both eyes 2 (two) times daily.,  "Disp: , Rfl:     FLUTICASONE PROPIONATE NASL, 1 spray by Each Nostril route daily as needed., Disp: , Rfl:     latanoprost 0.005 % ophthalmic solution, Place 1 drop into both eyes once daily., Disp: , Rfl:     loratadine (CLARITIN) 10 mg tablet, Take 1 tablet (10 mg total) by mouth once daily. (Patient not taking: Reported on 8/1/2023), Disp: 90 tablet, Rfl: 1    meloxicam (MOBIC) 7.5 MG tablet, Take 1 tablet (7.5 mg total) by mouth once daily. (Patient not taking: Reported on 6/21/2023), Disp: 30 tablet, Rfl: 1    multivit-min-FA-lycopen-lutein (CENTRUM SILVER) 0.4 mg-300 mcg- 250 mcg Tab, Take 1 tablet by mouth once daily., Disp: , Rfl:     multivitamin/iron/folic acid (CENTRUM ULTRA WOMEN'S ORAL), Take 1 tablet by mouth once daily., Disp: , Rfl:     pregabalin (LYRICA) 50 MG capsule, Take 1 capsule by mouth 3 (three) times daily., Disp: , Rfl:     triamcinolone acetonide 0.025% (KENALOG) 0.025 % cream, Apply topically 2 (two) times daily. (Patient not taking: Reported on 8/1/2023), Disp: 454 g, Rfl: 2  Meds reviewed and reconciled.      Review of Systems   Respiratory:  Negative for shortness of breath.    Cardiovascular:  Negative for chest pain, palpitations, orthopnea, claudication, leg swelling and PND.   Neurological:  Negative for dizziness, loss of consciousness and weakness.           Objective:   Pulse 75   Ht 5' 7" (1.702 m)   SpO2 98%   BMI 32.12 kg/m²     Physical Exam  Vitals and nursing note reviewed.   Constitutional:       Appearance: Normal appearance. She is normal weight.      Comments: In a wheelchair   HENT:      Head: Normocephalic and atraumatic.   Neck:      Vascular: No carotid bruit.   Cardiovascular:      Rate and Rhythm: Normal rate and regular rhythm.      Pulses: Normal pulses.      Heart sounds: Normal heart sounds.   Pulmonary:      Effort: Pulmonary effort is normal.      Breath sounds: Normal breath sounds.   Abdominal:      Palpations: Abdomen is soft.   Musculoskeletal:      " Cervical back: Neck supple.      Right lower leg: No edema.      Left lower leg: No edema.   Skin:     General: Skin is warm and dry.      Capillary Refill: Capillary refill takes less than 2 seconds.   Neurological:      General: No focal deficit present.      Mental Status: She is alert.           Assessment:     1. Cardiomyopathy, unspecified type  Echo      2. Chronic combined systolic and diastolic congestive heart failure        3. Coronary artery disease involving native coronary artery of native heart without angina pectoris        4. Essential hypertension              Plan:   Chronic combined systolic and diastolic congestive heart failure  Patient is identified as having combined systolic and diastolic heart failure that is chronic. CHF is currently controlled. Latest ECHO performed and demonstrates- Results for orders placed during the hospital encounter of 08/03/21    Echo    Interpretation Summary  · The left ventricle is normal in size with moderately decreased systolic function.  · The estimated ejection fraction is 30%.  · Grade I left ventricular diastolic dysfunction.  · Mild right ventricular enlargement with normal right ventricular systolic function.  · Normal central venous pressure (3 mmHg).  · Mild mitral regurgitation.  . Continue losartan, metoprolol, torsemide and Farxiga and monitor clinical status closely.   Patient's renal function has improved from Cr of 1.71 to 1.38 but not back to baseline of 1.18-1.25. continue above meds but will not change losartan to Entresto at this point or add aldactone.   Repeat echo in 2 months (3 months from starting metoprolol and entresto) to re-eval LVEF and candidacy for ICD. I had brief discussion with the patient and her son regarding ICD purpose and function. If her LVEF is 35% or less on upcoming echo, I will d/w them ICD in more detail.    Coronary artery disease  Asymptomatic  Continue ASA/Lipitor    Essential hypertension  116/58 mmHg      RTC:  4 months

## 2023-08-01 NOTE — ASSESSMENT & PLAN NOTE
Patient is identified as having combined systolic and diastolic heart failure that is chronic. CHF is currently controlled. Latest ECHO performed and demonstrates- Results for orders placed during the hospital encounter of 08/03/21    Echo    Interpretation Summary  · The left ventricle is normal in size with moderately decreased systolic function.  · The estimated ejection fraction is 30%.  · Grade I left ventricular diastolic dysfunction.  · Mild right ventricular enlargement with normal right ventricular systolic function.  · Normal central venous pressure (3 mmHg).  · Mild mitral regurgitation.  . Continue losartan, metoprolol, torsemide and Farxiga and monitor clinical status closely.   Patient's renal function has improved from Cr of 1.71 to 1.38 but not back to baseline of 1.18-1.25. continue above meds but will not change losartan to Entresto at this point or add aldactone.   Repeat echo in 2 months (3 months from starting metoprolol and entresto) to re-eval LVEF and candidacy for ICD. I had brief discussion with the patient and her son regarding ICD purpose and function. If her LVEF is 35% or less on upcoming echo, I will d/w them ICD in more detail.

## 2023-08-10 ENCOUNTER — EXTERNAL HOME HEALTH (OUTPATIENT)
Dept: HOME HEALTH SERVICES | Facility: HOSPITAL | Age: 78
End: 2023-08-10
Payer: COMMERCIAL

## 2023-08-10 RX ORDER — TRIAMCINOLONE ACETONIDE 0.25 MG/G
CREAM TOPICAL 2 TIMES DAILY
Qty: 454 G | Refills: 2 | Status: SHIPPED | OUTPATIENT
Start: 2023-08-10 | End: 2023-11-02 | Stop reason: SDUPTHER

## 2023-08-14 DIAGNOSIS — I42.9 CARDIOMYOPATHY, UNSPECIFIED TYPE: ICD-10-CM

## 2023-08-14 RX ORDER — METOPROLOL SUCCINATE 100 MG/1
100 TABLET, EXTENDED RELEASE ORAL DAILY
Qty: 30 TABLET | Refills: 1 | Status: SHIPPED | OUTPATIENT
Start: 2023-08-14 | End: 2023-10-16 | Stop reason: SDUPTHER

## 2023-09-07 ENCOUNTER — OFFICE VISIT (OUTPATIENT)
Dept: WOUND CARE | Facility: CLINIC | Age: 78
End: 2023-09-07
Attending: FAMILY MEDICINE
Payer: COMMERCIAL

## 2023-09-07 DIAGNOSIS — L97.812 NON-PRESSURE CHRONIC ULCER OF OTHER PART OF RIGHT LOWER LEG WITH FAT LAYER EXPOSED: ICD-10-CM

## 2023-09-07 PROCEDURE — 1160F RVW MEDS BY RX/DR IN RCRD: CPT | Mod: CPTII,,, | Performed by: NURSE PRACTITIONER

## 2023-09-07 PROCEDURE — 1159F PR MEDICATION LIST DOCUMENTED IN MEDICAL RECORD: ICD-10-PCS | Mod: CPTII,,, | Performed by: NURSE PRACTITIONER

## 2023-09-07 PROCEDURE — 1159F MED LIST DOCD IN RCRD: CPT | Mod: CPTII,,, | Performed by: NURSE PRACTITIONER

## 2023-09-07 PROCEDURE — 99213 OFFICE O/P EST LOW 20 MIN: CPT | Mod: S$PBB,,, | Performed by: NURSE PRACTITIONER

## 2023-09-07 PROCEDURE — 99213 PR OFFICE/OUTPT VISIT, EST, LEVL III, 20-29 MIN: ICD-10-PCS | Mod: S$PBB,,, | Performed by: NURSE PRACTITIONER

## 2023-09-07 PROCEDURE — 99213 OFFICE O/P EST LOW 20 MIN: CPT | Mod: PBBFAC | Performed by: NURSE PRACTITIONER

## 2023-09-07 PROCEDURE — 1160F PR REVIEW ALL MEDS BY PRESCRIBER/CLIN PHARMACIST DOCUMENTED: ICD-10-PCS | Mod: CPTII,,, | Performed by: NURSE PRACTITIONER

## 2023-09-07 RX ORDER — PREDNISONE 10 MG/1
TABLET ORAL
Qty: 74 TABLET | Refills: 0 | Status: SHIPPED | OUTPATIENT
Start: 2023-09-07 | End: 2023-10-11

## 2023-09-07 NOTE — PATIENT INSTRUCTIONS
Clean with baby shampoo and water  Apply mepitel and Aquacel Ag, wrap with kerlix and ace wrap or sure press from toes to just below knee lightly  Change every 2 days and as needed  Apply two layer compression from base of toes to below knees  Elevate legs as much as possible  Avoid prolonged standing  Monitor closely for s/s of infection including fever, chills, increase in pain, odor from wound, and increased redness from foot. Go to ER if any complications develop.   Keep leg elevated and avoid pressure on wound.  Diabetes:  Monitor glucose closely. Check fasting glucose and 2 hours after meals. HgA1C goal <7, fasting glucose , and 2 hours after meals <180  Hypertension:  Check blood pressure twice daily, goal <120/80  Diet:   Increase protein intake, avoid fried, fatty foods and foods high in simple carbs.   Vitamins:  Take vitamin C 1000 mg, zinc 50mg, vitamin d 5000 units, and a daily multivitamin. Ernie is a good source of protein and nutrients to aid in wound healing.

## 2023-09-08 NOTE — PROGRESS NOTES
MYA Escobar   [facility]  [address] [facility] [place] [address]  [phone]      PATIENT NAME: Delma Rae  : 1945  DATE: 23  MRN: [id number]      Billing Provider: MYA Escobar  Level of Service:   Patient PCP Information       Provider PCP Type    Fco Chaparro MD General            Reason for Visit / Chief Complaint: Venous ulcer of right leg       History of Present Illness / Problem Focused Workflow     Delma Rae is a 78 y.o. female who presents to clinic for follow up on chronic-non healing wound on the right lower leg. Wound improves with prednisone but flares when prednisone is tapered back to 2.5 mg.  She has appointment with Rheumatologist on Monday.  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 Initiate prednisone taper with mepitel and aquacel ag.     Arterial doppler 2022, No areas of occlusion. No doubling of peak systolic velocity between adjacent stations to specifically suggest high-grade stenosis of a focal nature. Ankle brachial indices measure normal at 1.27 right and 1.19 left.     Significant PMH of CHF, COPD, hypertension, MI, rheumatoid arthritis, hyperlipidemia, GERD, and diabetes. Last HgA1C 7 in , diabetes managed by PCP.  Pertinent factors that delay wound healing include multiple co-morbidities, poor vascular supply, diabetes, edema, heavy drainage, excessive wound moisture and macerated tissue, large surface area, advanced age and fragile skin. Denies fever and chills.      Review of Systems     Review of Systems   Constitutional:  Positive for activity change. Negative for chills and fever.   Respiratory:  Negative for chest tightness and shortness of breath.    Cardiovascular:  Positive for leg swelling. Negative for chest pain and palpitations.   Musculoskeletal:  Positive for arthralgias and joint swelling.   Skin:  Positive for wound.        See wound  assessment   Neurological:  Positive for weakness and numbness.   Psychiatric/Behavioral:  Negative for agitation, behavioral problems, confusion and self-injury.      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 Chaidez    GERD (gastroesophageal reflux disease)     HHD (hypertensive heart disease)     Hyperlipidemia     Hypertension     MI (myocardial infarction)     OA (osteoarthritis)     Rheumatoid arthritis        Past Surgical History:   Procedure Laterality Date    HYSTERECTOMY      OOPHORECTOMY      VAGINAL DELIVERY      x 1       Social History  Ms. Delma Rae  reports that she has never smoked. She has never used smokeless tobacco. She reports that she does not drink alcohol and does not use drugs.    Family History  Ms.'aubree Rae family history includes Cancer in her brother and father; Coronary artery disease in her brother and sister; Diabetes in her father and mother; Hypertension in her mother and son; Prostate cancer in her father.    Medications and Allergies     Medications  Outpatient Medications Marked as Taking for the 9/7/23 encounter (Office Visit) with Shannan De Souza FNP   Medication Sig Dispense Refill    amitriptyline (ELAVIL) 10 MG tablet Take 1 tablet by mouth every evening.      amLODIPine (NORVASC) 5 MG tablet 1 tablet Orally Once a day      amoxicillin (AMOXIL) 500 MG capsule Take 500 mg by mouth 2 (two) times daily.      aspirin (ECOTRIN) 81 MG EC tablet Take 81 mg by mouth once daily.      atorvastatin (LIPITOR) 40 MG tablet Take 1 tablet (40 mg total) by mouth once daily. 90 tablet 1    blood sugar diagnostic (ONETOUCH  "ULTRA BLUE TEST STRIP) Strp 1 strip by Other route 4 (four) times daily before meals and nightly. Dx e11.9 200 strip 5    brimonidine 0.2% (ALPHAGAN) 0.2 % Drop Place 1 drop into both eyes 2 (two) times daily.      dapagliflozin propanediol (FARXIGA) 10 mg tablet Take 1 tablet (10 mg total) by mouth once daily. 30 tablet 11    ferrous sulfate 325 (65 FE) MG EC tablet Take 1 tablet (325 mg total) by mouth once daily. 90 tablet 1    fluticasone furoate-vilanteroL (BREO ELLIPTA) 100-25 mcg/dose diskus inhaler Inhale 1 puff into the lungs daily as needed.      FLUTICASONE PROPIONATE NASL 1 spray by Each Nostril route daily as needed.      folic acid (FOLVITE) 1 MG tablet Take 1 mg by mouth once daily.      hydrALAZINE (APRESOLINE) 25 MG tablet Take 1 tablet (25 mg total) by mouth 3 (three) times daily. 270 tablet 1    hydrOXYchloroQUINE (PLAQUENIL) 200 mg tablet Take 1 tablet (200 mg total) by mouth once daily. 90 tablet 1    insulin asp prt-insulin aspart, NovoLOG 70/30, (NOVOLOG 70/30) 100 unit/mL (70-30) Soln Subcutaneous      insulin NPH-insulin regular, 70/30, (NOVOLIN 70/30 U-100 INSULIN) 100 unit/mL (70-30) injection INJECT 40 UNITS UNDER SKIN IN THE MORNING AND 15 IN THE EVENING. 50 mL 6    insulin syringe-needle U-100 1 mL 31 gauge x 5/16 Syrg Inject 1 each into the skin 2 (two) times a day. Dx e11.9 200 each 3    insulin syringe-needle U-100 1/2 mL 31 gauge x 15/64" Syrg by Misc.(Non-Drug; Combo Route) route. Use as directed with insulin      lancets Misc 1 each by skin prick route 4 (four) times daily before meals and nightly. 200 each 5    latanoprost 0.005 % ophthalmic solution Place 1 drop into both eyes once daily.      losartan (COZAAR) 100 MG tablet Take 1 tablet (100 mg total) by mouth once daily. 90 tablet 5    methotrexate 2.5 MG Tab Take 2.5 mg by mouth every 7 days. 5 tablets q7 days      metoprolol succinate (TOPROL-XL) 100 MG 24 hr tablet Take 1 tablet (100 mg total) by mouth " once daily. 30 tablet 1    multivit-min-FA-lycopen-lutein (CENTRUM SILVER) 0.4 mg-300 mcg- 250 mcg Tab Take 1 tablet by mouth once daily.      multivitamin/iron/folic acid (CENTRUM ULTRA WOMEN'S ORAL) Take 1 tablet by mouth once daily.      naproxen (NAPROSYN) 500 MG tablet Take 1 tablet (500 mg total) by mouth 2 (two) times daily as needed. 20 tablet 1    omeprazole (PRILOSEC) 20 MG capsule Take 1 capsule (20 mg total) by mouth once daily. 90 capsule 1    pantoprazole (PROTONIX) 40 MG tablet Take 40 mg by mouth once daily.      potassium chloride SA (K-DUR,KLOR-CON M) 10 MEQ tablet Take 1 tablet (10 mEq total) by mouth 2 (two) times daily. 60 tablet 3    predniSONE (DELTASONE) 10 MG tablet Take 4 tablets (40 mg total) by mouth once daily for 7 days, THEN 3 tablets (30 mg total) once daily for 7 days, THEN 2 tablets (20 mg total) once daily for 7 days, THEN 1 tablet (10 mg total) once daily for 7 days, THEN 0.5 tablets (5 mg total) once daily for 7 days. 74 tablet 0    predniSONE (DELTASONE) 2.5 MG tablet Take 2.5 mg by mouth once daily at 6am.      pregabalin (LYRICA) 50 MG capsule Take 1 capsule by mouth 3 (three) times daily.      torsemide (DEMADEX) 20 MG Tab Take 2 tablets (40 mg total) by mouth every morning. 180 tablet 1    triamcinolone acetonide 0.025% (KENALOG) 0.025 % cream Apply topically 2 (two) times daily. 454 g 2    venlafaxine (EFFEXOR-XR) 75 MG 24 hr capsule Take 1 capsule (75 mg total) by mouth every other day. 45 capsule 1    zolpidem (AMBIEN) 5 MG Tab Take 1 tablet (5 mg total) by mouth nightly as needed. 1 tablet 0       Allergies  Review of patient's allergies indicates:  No Known Allergies    Physical Examination   There were no vitals filed for this visit.  Physical Exam  Vitals and nursing note reviewed.   Constitutional:       Appearance: Normal appearance.   HENT:      Head: Normocephalic.   Cardiovascular:      Rate and Rhythm: Normal rate and regular rhythm.      Pulses:  Normal pulses.      Heart sounds: Normal heart sounds.   Pulmonary:      Effort: Pulmonary effort is normal. No respiratory distress.   Chest:      Chest wall: No tenderness.   Musculoskeletal:         General: Swelling and tenderness present.      Right lower leg: Edema present.      Left lower leg: Edema present.   Skin:     General: Skin is warm and dry.      Capillary Refill: Capillary refill takes 2 to 3 seconds.      Findings: Erythema present.      Comments: See LDA for photo and measurements   Neurological:      General: No focal deficit present.      Mental Status: She is alert and oriented to person, place, and time. Mental status is at baseline.   Psychiatric:         Mood and Affect: Mood normal.         Behavior: Behavior normal.         Thought Content: Thought content normal.         Judgment: Judgment normal.     Assessment and Plan             Wound 09/27/21 Right lower;lateral Leg #3 (Active)   09/27/21     Pre-existing:    Primary Wound Type:    Side: Right   Orientation: lower;lateral   Location: Leg   Wound Number: #3   Ankle-Brachial Index:    Pulses:    Removal Indication and Assessment:    Wound Outcome:    (Retired) Wound Type:    (Retired) Wound Length (cm):    (Retired) Wound Width (cm):    (Retired) Depth (cm):    Wound Description (Comments):    Removal Indications:    Wound Image    09/07/23 1351   Wound Length (cm) 7 cm 09/07/23 1351   Wound Width (cm) 7 cm 09/07/23 1351   Wound Depth (cm) 0.2 cm 09/07/23 1351   Wound Volume (cm^3) 9.8 cm^3 09/07/23 1351   Wound Surface Area (cm^2) 49 cm^2 09/07/23 1351     Problem List Items Addressed This Visit          Orthopedic    Non-pressure chronic ulcer of other part of right lower leg with fat layer exposed    Overview                          Current Assessment & Plan     Clean with baby shampoo and water  Apply mepitel and Aquacel Ag, wrap with kerlix and ace wrap or sure press from toes to just below knee lightly  Change every 2 days and  as needed  Apply two layer compression from base of toes to below knees  Elevate legs as much as possible  Avoid prolonged standing  Monitor closely for s/s of infection including fever, chills, increase in pain, odor from wound, and increased redness from foot. Go to ER if any complications develop.   Keep leg elevated and avoid pressure on wound.  Diabetes:  Monitor glucose closely. Check fasting glucose and 2 hours after meals. HgA1C goal <7, fasting glucose , and 2 hours after meals <180  Hypertension:  Check blood pressure twice daily, goal <120/80  Diet:   Increase protein intake, avoid fried, fatty foods and foods high in simple carbs.   Vitamins:  Take vitamin C 1000 mg, zinc 50mg, vitamin d 5000 units, and a daily multivitamin. Ernie is a good source of protein and nutrients to aid in wound healing.             Future Appointments   Date Time Provider Department Center   9/11/2023 11:30 AM Anthony Sorto MD [facility]IN [facility]   9/11/2023  1:00 PM [facility] CD[facility]AG [facility]   10/5/2023  1:00 PM Shannan De Souza FNP [facility] OPWC [facility]   12/4/2023  2:45 PM Lianet Bryson ACNP [facility] CARD [facility]   1/12/2024  3:00 PM AWPATRICIA NURSE, [facility] FAMILY MEDICINE [facility] SHELDON Simental            Signature:  MYA Escobar  [facility]  [address] [facility] [address]  [phone]    Date of encounter: 9/7/23

## 2023-09-11 ENCOUNTER — HOSPITAL ENCOUNTER (OUTPATIENT)
Dept: CARDIOLOGY | Facility: HOSPITAL | Age: 78
Discharge: HOME | End: 2023-09-11
Attending: NURSE PRACTITIONER
Payer: COMMERCIAL

## 2023-09-11 ENCOUNTER — OFFICE VISIT (OUTPATIENT)
Dept: RHEUMATOLOGY | Facility: CLINIC | Age: 78
End: 2023-09-11
Payer: COMMERCIAL

## 2023-09-11 VITALS
WEIGHT: 205 LBS | RESPIRATION RATE: 20 BRPM | HEIGHT: 67 IN | OXYGEN SATURATION: 97 % | DIASTOLIC BLOOD PRESSURE: 62 MMHG | SYSTOLIC BLOOD PRESSURE: 108 MMHG | BODY MASS INDEX: 32.18 KG/M2 | HEART RATE: 91 BPM

## 2023-09-11 VITALS — BODY MASS INDEX: 32.18 KG/M2 | HEIGHT: 67 IN | WEIGHT: 205 LBS

## 2023-09-11 DIAGNOSIS — I42.9 CARDIOMYOPATHY, UNSPECIFIED TYPE: ICD-10-CM

## 2023-09-11 DIAGNOSIS — M06.9 RHEUMATOID ARTHRITIS, INVOLVING UNSPECIFIED SITE, UNSPECIFIED WHETHER RHEUMATOID FACTOR PRESENT: ICD-10-CM

## 2023-09-11 DIAGNOSIS — M05.20 RHEUMATOID VASCULITIS: Primary | ICD-10-CM

## 2023-09-11 PROCEDURE — 1101F PT FALLS ASSESS-DOCD LE1/YR: CPT | Mod: CPTII,,, | Performed by: INTERNAL MEDICINE

## 2023-09-11 PROCEDURE — 3074F PR MOST RECENT SYSTOLIC BLOOD PRESSURE < 130 MM HG: ICD-10-PCS | Mod: CPTII,,, | Performed by: INTERNAL MEDICINE

## 2023-09-11 PROCEDURE — 1101F PR PT FALLS ASSESS DOC 0-1 FALLS W/OUT INJ PAST YR: ICD-10-PCS | Mod: CPTII,,, | Performed by: INTERNAL MEDICINE

## 2023-09-11 PROCEDURE — 99215 OFFICE O/P EST HI 40 MIN: CPT | Mod: PBBFAC,25 | Performed by: INTERNAL MEDICINE

## 2023-09-11 PROCEDURE — 93306 TTE W/DOPPLER COMPLETE: CPT | Mod: 26,,, | Performed by: INTERNAL MEDICINE

## 2023-09-11 PROCEDURE — 93306 ECHO (CUPID ONLY): ICD-10-PCS | Mod: 26,,, | Performed by: INTERNAL MEDICINE

## 2023-09-11 PROCEDURE — 93306 TTE W/DOPPLER COMPLETE: CPT

## 2023-09-11 PROCEDURE — 3078F PR MOST RECENT DIASTOLIC BLOOD PRESSURE < 80 MM HG: ICD-10-PCS | Mod: CPTII,,, | Performed by: INTERNAL MEDICINE

## 2023-09-11 PROCEDURE — 1159F PR MEDICATION LIST DOCUMENTED IN MEDICAL RECORD: ICD-10-PCS | Mod: CPTII,,, | Performed by: INTERNAL MEDICINE

## 2023-09-11 PROCEDURE — 3078F DIAST BP <80 MM HG: CPT | Mod: CPTII,,, | Performed by: INTERNAL MEDICINE

## 2023-09-11 PROCEDURE — 1126F PR PAIN SEVERITY QUANTIFIED, NO PAIN PRESENT: ICD-10-PCS | Mod: CPTII,,, | Performed by: INTERNAL MEDICINE

## 2023-09-11 PROCEDURE — 1159F MED LIST DOCD IN RCRD: CPT | Mod: CPTII,,, | Performed by: INTERNAL MEDICINE

## 2023-09-11 PROCEDURE — 99205 PR OFFICE/OUTPT VISIT, NEW, LEVL V, 60-74 MIN: ICD-10-PCS | Mod: S$PBB,,, | Performed by: INTERNAL MEDICINE

## 2023-09-11 PROCEDURE — 1126F AMNT PAIN NOTED NONE PRSNT: CPT | Mod: CPTII,,, | Performed by: INTERNAL MEDICINE

## 2023-09-11 PROCEDURE — 3288F FALL RISK ASSESSMENT DOCD: CPT | Mod: CPTII,,, | Performed by: INTERNAL MEDICINE

## 2023-09-11 PROCEDURE — 3074F SYST BP LT 130 MM HG: CPT | Mod: CPTII,,, | Performed by: INTERNAL MEDICINE

## 2023-09-11 PROCEDURE — 99205 OFFICE O/P NEW HI 60 MIN: CPT | Mod: S$PBB,,, | Performed by: INTERNAL MEDICINE

## 2023-09-11 PROCEDURE — 3288F PR FALLS RISK ASSESSMENT DOCUMENTED: ICD-10-PCS | Mod: CPTII,,, | Performed by: INTERNAL MEDICINE

## 2023-09-11 RX ORDER — FUROSEMIDE 40 MG/1
40 TABLET ORAL 3 TIMES DAILY
COMMUNITY
End: 2023-11-02 | Stop reason: SDUPTHER

## 2023-09-11 NOTE — PROGRESS NOTES
Anthony Sorot MD   [facility]  [address] [facility]  [place] [address]  [phone]      PATIENT NAME: Delma Rae  : 1945  DATE: 23  MRN: [id number]      Billing Provider: Anthony Sorto MD  Level of Service:   Patient PCP Information       Provider PCP Type    Fco Chaparro MD General            Reason for Visit / Chief Complaint: No chief complaint on file.           Assessment and Plan (including Health Maintenance)      Problem List  Smart Sets  Document Outside HM   :    Plan:     77 yo in clinic with her son who also has RA [ patient on oxygen and wheelchair bound]   Dx with RA in 2016   Currently on mtx 6 tabs/week   Also takes plaquenil  Prednisone 2.5mg per day.   Oxygen dependency is from CHF [ non-smoker]   Patient is also concerned about the wounds on her right lower extremity. I did not visualize them today as she had a dressing on. But reportedly has edema persistently [CHF] and diabetic. I suspect the wounds are of non-rheumatic etiology based on probability of uncontrolled CHF and diabetes. Unlikely to be vasculitis. I can consider Rituxan infusion for her RA which may help with reducing prednisone and addressing any vasculitic compoonent from RA to the wounds.   Son shows me bottle of prednisone [taper rx] which is prescribed to her for when her wounds flare. She does response to it. So I will check for rheumatological serologies today with goal to move to rituxan for wound vasculitis from RA and as steroid sparing. TNF-inhibitors would be contraindicated in setting of CHF and active wounds.       Is ambulatory without wheelchair at home.     Rheumatoid arthritis, involving unspecified site, unspecified whether rheumatoid factor present  -     Ambulatory referral/consult to Rheumatology             Total time spent in Assessment, Co-ordination of Care , Review of Care Plan , Goal Setting and Counseling on this initial  visit is ~ 60 minutes     There is currently no information documented on the homunculus. Go to the Rheumatology activity and complete the homunculus joint exam.               History of Present Illness / Problem Focused Workflow     Delma Rae presents to the clinic with No chief complaint on file.     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 Chaidez    GERD (gastroesophageal reflux disease)     HHD (hypertensive heart disease)     Hyperlipidemia     Hypertension     MI (myocardial infarction)     OA (osteoarthritis)     Rheumatoid arthritis        Past Surgical History:   Procedure Laterality Date    HYSTERECTOMY      OOPHORECTOMY      VAGINAL DELIVERY      x 1       Social History  Ms. Delma Rae  reports that she has never smoked. She has never used smokeless tobacco. She reports that she does not drink alcohol and does not use drugs.    Family History  Ms.'s Delma Rae family history includes Cancer in her brother and father; Coronary artery disease in her brother and sister; Diabetes in her father and mother; Hypertension in her mother and son; Prostate cancer in her father.    Medications and Allergies     Medications  Outpatient Medications Marked as Taking for the 9/11/23 encounter (Office Visit) with Anthony Sorto MD   Medication Sig Dispense Refill    aspirin (ECOTRIN) 81 MG EC tablet Take 81 mg by mouth once daily.      atorvastatin (LIPITOR) 40 MG tablet Take 1 tablet (40 mg total) by mouth once daily. 90 tablet 1    blood sugar diagnostic (ONETOUCH ULTRA BLUE TEST STRIP) Strp 1 strip by Other  "route 4 (four) times daily before meals and nightly. Dx e11.9 200 strip 5    brimonidine 0.2% (ALPHAGAN) 0.2 % Drop Place 1 drop into both eyes 2 (two) times daily.      dapagliflozin propanediol (FARXIGA) 10 mg tablet Take 1 tablet (10 mg total) by mouth once daily. 30 tablet 11    folic acid (FOLVITE) 1 MG tablet Take 1 mg by mouth once daily.      furosemide (LASIX) 40 MG tablet Take 40 mg by mouth 3 (three) times daily.      hydrALAZINE (APRESOLINE) 25 MG tablet Take 1 tablet (25 mg total) by mouth 3 (three) times daily. 270 tablet 1    hydrOXYchloroQUINE (PLAQUENIL) 200 mg tablet Take 1 tablet (200 mg total) by mouth once daily. 90 tablet 1    insulin asp prt-insulin aspart, NovoLOG 70/30, (NOVOLOG 70/30) 100 unit/mL (70-30) Soln Subcutaneous      insulin NPH-insulin regular, 70/30, (NOVOLIN 70/30 U-100 INSULIN) 100 unit/mL (70-30) injection INJECT 40 UNITS UNDER SKIN IN THE MORNING AND 15 IN THE EVENING. 50 mL 6    insulin syringe-needle U-100 1 mL 31 gauge x 5/16 Syrg Inject 1 each into the skin 2 (two) times a day. Dx e11.9 200 each 3    insulin syringe-needle U-100 1/2 mL 31 gauge x 15/64" Syrg by Misc.(Non-Drug; Combo Route) route. Use as directed with insulin      lancets Misc 1 each by skin prick route 4 (four) times daily before meals and nightly. 200 each 5    latanoprost 0.005 % ophthalmic solution Place 1 drop into both eyes once daily.      loratadine (CLARITIN) 10 mg tablet Take 1 tablet (10 mg total) by mouth once daily. 90 tablet 1    losartan (COZAAR) 100 MG tablet Take 1 tablet (100 mg total) by mouth once daily. 90 tablet 5    methotrexate 2.5 MG Tab Take 2.5 mg by mouth every 7 days. 5 tablets q7 days      metoprolol succinate (TOPROL-XL) 100 MG 24 hr tablet Take 1 tablet (100 mg total) by mouth once daily. 30 tablet 1    multivit-min-FA-lycopen-lutein (CENTRUM SILVER) 0.4 mg-300 mcg- 250 mcg Tab Take 1 tablet by mouth once daily.      multivitamin/iron/folic acid (CENTRUM ULTRA WOMEN'S ORAL) " Take 1 tablet by mouth once daily.      naproxen (NAPROSYN) 500 MG tablet Take 1 tablet (500 mg total) by mouth 2 (two) times daily as needed. 20 tablet 1    omeprazole (PRILOSEC) 20 MG capsule Take 1 capsule (20 mg total) by mouth once daily. 90 capsule 1    pantoprazole (PROTONIX) 40 MG tablet Take 40 mg by mouth once daily.      potassium chloride SA (K-DUR,KLOR-CON M) 10 MEQ tablet Take 1 tablet (10 mEq total) by mouth 2 (two) times daily. 60 tablet 3    predniSONE (DELTASONE) 10 MG tablet Take 4 tablets (40 mg total) by mouth once daily for 7 days, THEN 3 tablets (30 mg total) once daily for 7 days, THEN 2 tablets (20 mg total) once daily for 7 days, THEN 1 tablet (10 mg total) once daily for 7 days, THEN 0.5 tablets (5 mg total) once daily for 7 days. 74 tablet 0    predniSONE (DELTASONE) 2.5 MG tablet Take 2.5 mg by mouth once daily at 6am.      torsemide (DEMADEX) 20 MG Tab Take 2 tablets (40 mg total) by mouth every morning. 180 tablet 1    triamcinolone acetonide 0.025% (KENALOG) 0.025 % cream Apply topically 2 (two) times daily. 454 g 2    venlafaxine (EFFEXOR-XR) 75 MG 24 hr capsule Take 1 capsule (75 mg total) by mouth every other day. 45 capsule 1    zolpidem (AMBIEN) 5 MG Tab Take 1 tablet (5 mg total) by mouth nightly as needed. 1 tablet 0       Allergies  Review of patient's allergies indicates:  No Known Allergies    Physical Examination     Vitals:    09/11/23 1124   BP: 108/62   Pulse: 91   Resp: 20     Physical Exam           Signature:  Anthony Sorto MD  [facility]  [address]  [facility] [address]  [phone]    Date of encounter: 9/11/23

## 2023-09-12 DIAGNOSIS — M06.9 RHEUMATOID ARTHRITIS, INVOLVING UNSPECIFIED SITE, UNSPECIFIED WHETHER RHEUMATOID FACTOR PRESENT: Primary | ICD-10-CM

## 2023-09-12 RX ORDER — METHOTREXATE 2.5 MG/1
12.5 TABLET ORAL
Qty: 60 TABLET | Refills: 1 | Status: SHIPPED | OUTPATIENT
Start: 2023-09-12 | End: 2023-10-25 | Stop reason: SDUPTHER

## 2023-09-12 RX ORDER — FOLIC ACID 1 MG/1
1 TABLET ORAL DAILY
Qty: 90 TABLET | Refills: 1 | Status: SHIPPED | OUTPATIENT
Start: 2023-09-12 | End: 2024-02-26 | Stop reason: SDUPTHER

## 2023-10-16 ENCOUNTER — EXTERNAL HOME HEALTH (OUTPATIENT)
Dept: HOME HEALTH SERVICES | Facility: HOSPITAL | Age: 78
End: 2023-10-16
Payer: COMMERCIAL

## 2023-10-16 DIAGNOSIS — I42.9 CARDIOMYOPATHY, UNSPECIFIED TYPE: ICD-10-CM

## 2023-10-16 RX ORDER — METOPROLOL SUCCINATE 100 MG/1
100 TABLET, EXTENDED RELEASE ORAL DAILY
Qty: 30 TABLET | Refills: 5 | Status: SHIPPED | OUTPATIENT
Start: 2023-10-16 | End: 2024-01-14

## 2023-10-20 RX ORDER — HYDRALAZINE HYDROCHLORIDE 25 MG/1
25 TABLET, FILM COATED ORAL 3 TIMES DAILY
Qty: 270 TABLET | Refills: 1 | Status: SHIPPED | OUTPATIENT
Start: 2023-10-20 | End: 2023-11-02 | Stop reason: SDUPTHER

## 2023-10-23 LAB
AORTIC ROOT ANNULUS: 2.22 CM
AORTIC VALVE CUSP SEPERATION: 1.93 CM
AV INDEX (PROSTH): 0.6
AV MEAN GRADIENT: 4 MMHG
AV PEAK GRADIENT: 7 MMHG
AV REGURGITATION PRESSURE HALF TIME: 532.03 MS
AV VALVE AREA BY VELOCITY RATIO: 2.3 CM²
AV VALVE AREA: 2.06 CM²
AV VELOCITY RATIO: 0.66
BSA FOR ECHO PROCEDURE: 2.1 M2
CV ECHO LV RWT: 0.27 CM
DOP CALC AO PEAK VEL: 1.31 M/S
DOP CALC AO VTI: 27 CM
DOP CALC LVOT AREA: 3.5 CM2
DOP CALC LVOT DIAMETER: 2.1 CM
DOP CALC LVOT PEAK VEL: 0.87 M/S
DOP CALC LVOT STROKE VOLUME: 55.74 CM3
DOP CALCLVOT PEAK VEL VTI: 16.1 CM
E WAVE DECELERATION TIME: 141.15 MSEC
E/E' RATIO: 15.38 M/S
ECHO LV POSTERIOR WALL: 0.81 CM (ref 0.6–1.1)
FRACTIONAL SHORTENING: 8 % (ref 28–44)
INTERVENTRICULAR SEPTUM: 0.83 CM (ref 0.6–1.1)
LEFT ATRIUM SIZE: 4.3 CM
LEFT ATRIUM VOLUME INDEX MOD: 28.9 ML/M2
LEFT ATRIUM VOLUME MOD: 59.01 CM3
LEFT INTERNAL DIMENSION IN SYSTOLE: 5.5 CM (ref 2.1–4)
LEFT VENTRICLE DIASTOLIC VOLUME INDEX: 87.8 ML/M2
LEFT VENTRICLE DIASTOLIC VOLUME: 179.11 ML
LEFT VENTRICLE MASS INDEX: 94 G/M2
LEFT VENTRICLE SYSTOLIC VOLUME INDEX: 72.4 ML/M2
LEFT VENTRICLE SYSTOLIC VOLUME: 147.6 ML
LEFT VENTRICULAR INTERNAL DIMENSION IN DIASTOLE: 5.99 CM (ref 3.5–6)
LEFT VENTRICULAR MASS: 191.36 G
LV LATERAL E/E' RATIO: 12.5 M/S
LV SEPTAL E/E' RATIO: 20 M/S
LVOT MG: 1.59 MMHG
LVOT MV: 0.6 CM/S
MV PEAK E VEL: 1 M/S
MV STENOSIS PRESSURE HALF TIME: 40.93 MS
MV VALVE AREA P 1/2 METHOD: 5.38 CM2
PISA AR MAX VEL: 2.51 M/S
PISA MRMAX VEL: 4.35 M/S
PISA TR MAX VEL: 2.37 M/S
RA VOL SYS: 50.73 ML
RIGHT ATRIAL AREA: 17.5 CM2
RIGHT VENTRICLE DIASTOLIC LENGTH: 6.1 CM
RIGHT VENTRICLE DIASTOLIC MID DIMENSION: 2.4 CM
RIGHT VENTRICULAR END-DIASTOLIC DIMENSION: 4 CM
RIGHT VENTRICULAR LENGTH IN DIASTOLE (APICAL 4-CHAMBER VIEW): 6.11 CM
RV MID DIAMA: 2.37 CM
TDI LATERAL: 0.08 M/S
TDI SEPTAL: 0.05 M/S
TDI: 0.07 M/S
TR MAX PG: 22 MMHG
TRICUSPID ANNULAR PLANE SYSTOLIC EXCURSION: 1.95 CM
Z-SCORE OF LEFT VENTRICULAR DIMENSION IN END DIASTOLE: -0.15
Z-SCORE OF LEFT VENTRICULAR DIMENSION IN END SYSTOLE: 3.08

## 2023-10-25 ENCOUNTER — OFFICE VISIT (OUTPATIENT)
Dept: RHEUMATOLOGY | Facility: CLINIC | Age: 78
End: 2023-10-25
Payer: COMMERCIAL

## 2023-10-25 DIAGNOSIS — M06.9 RHEUMATOID ARTHRITIS, INVOLVING UNSPECIFIED SITE, UNSPECIFIED WHETHER RHEUMATOID FACTOR PRESENT: ICD-10-CM

## 2023-10-25 DIAGNOSIS — M05.20 RHEUMATOID VASCULITIS: Primary | ICD-10-CM

## 2023-10-25 PROCEDURE — 99214 PR OFFICE/OUTPT VISIT, EST, LEVL IV, 30-39 MIN: ICD-10-PCS | Mod: GT,,, | Performed by: INTERNAL MEDICINE

## 2023-10-25 PROCEDURE — 99214 OFFICE O/P EST MOD 30 MIN: CPT | Mod: GT,,, | Performed by: INTERNAL MEDICINE

## 2023-10-25 RX ORDER — METHOTREXATE 2.5 MG/1
12.5 TABLET ORAL
Qty: 60 TABLET | Refills: 1 | Status: SHIPPED | OUTPATIENT
Start: 2023-10-25 | End: 2023-12-19 | Stop reason: SDUPTHER

## 2023-10-25 RX ORDER — HYDROXYCHLOROQUINE SULFATE 200 MG/1
200 TABLET, FILM COATED ORAL DAILY
Qty: 90 TABLET | Refills: 1 | Status: SHIPPED | OUTPATIENT
Start: 2023-10-25 | End: 2023-11-02 | Stop reason: SDUPTHER

## 2023-10-25 NOTE — PROGRESS NOTES
Anthony Sorto MD   [facility]  [address] [facility] [place] [address]  [phone]      PATIENT NAME: Delma Rae  : 1945  DATE: 10/25/23  MRN: [id number]      Billing Provider: Anthony Sorto MD  Level of Service:   Patient PCP Information       Provider PCP Type    Fco Chaparro MD General            Reason for Visit / Chief Complaint: No chief complaint on file.     The patient location is: home  The chief complaint leading to consultation is:    Visit type:     Face to Face time with patient:    30  minutes of total time spent on the encounter, which includes face to face time and non-face to face time preparing to see the patient (eg, review of tests), Obtaining and/or reviewing separately obtained history, Documenting clinical information in the electronic or other health record, Independently interpreting results (not separately reported) and communicating results to the patient/family/caregiver, or Care coordination (not separately reported).         Each patient to whom he or she provides medical services by telemedicine is:  (1) informed of the relationship between the physician and patient and the respective role of any other health care provider with respect to management of the patient; and (2) notified that he or she may decline to receive medical services by telemedicine and may withdraw from such care at any time.    Notes:         Assessment and Plan (including Health Maintenance)      Problem List  Smart Sets  Document Outside HM   :    Plan:     79 yo in clinic with her son who also has RA [ patient on oxygen and wheelchair bound]   Dx with RA in 2016   Currently on mtx 6 tabs/week   Also takes plaquenil  Prednisone 2.5mg per day.   Oxygen dependency is from CHF [ non-smoker]   Patient is also concerned about the wounds on her right lower extremity. I did not visualize them today as she had a dressing on. But reportedly  has edema persistently [CHF] and diabetic. I suspect the wounds are of non-rheumatic etiology based on probability of uncontrolled CHF and diabetes. Unlikely to be vasculitis. I can consider Rituxan infusion for her RA which may help with reducing prednisone and addressing any vasculitic compoonent from RA to the wounds.   Son shows me bottle of prednisone [taper rx] which is prescribed to her for when her wounds flare. She does response to it. So I will check for rheumatological serologies today with goal to move to rituxan for wound vasculitis from RA and as steroid sparing. TNF-inhibitors would be contraindicated in setting of CHF and active wounds.     Oct 25 2023;  RF ACPA +++ high titer   Wounds are healing slowly  CXR reviewed since 2021 onwards and all confirming opacity in lung fields. No prior HRCT to confirm ILD   HRCT followed by consideration for Rituxan infusions.   Screening labs ordered  Renew MTX and Plaquenil today.       ICD-10-CM ICD-9-CM    1. Rheumatoid vasculitis  M05.20 447.6 CT Chest High Resolution Without Contrast      2. Rheumatoid arthritis, involving unspecified site, unspecified whether rheumatoid factor present  M06.9 714.0 methotrexate 2.5 MG Tab      hydroxychloroquine (PLAQUENIL) 200 mg tablet             There are no diagnoses linked to this encounter.           Total time spent in Assessment, Co-ordination of Care , Review of Care Plan , Goal Setting and Counseling on this initial visit is 60 minutes     There is currently no information documented on the homunculus. Go to the Rheumatology activity and complete the homunculus joint exam.               History of Present Illness / Problem Focused Workflow     Delma Rae presents to the clinic with No chief complaint on file.     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 Chaidez    GERD (gastroesophageal reflux disease)     HHD (hypertensive heart disease)     Hyperlipidemia     Hypertension     MI (myocardial infarction)     OA (osteoarthritis)     Rheumatoid arthritis        Past Surgical History:   Procedure Laterality Date    HYSTERECTOMY      OOPHORECTOMY      VAGINAL DELIVERY      x 1       Social History  Ms. Delma Rae  reports that she has never smoked. She has never used smokeless tobacco. She reports that she does not drink alcohol and does not use drugs.    Family History  Ms.'s Delma Rae family history includes Cancer in her brother and father; Coronary artery disease in her brother and sister; Diabetes in her father and mother; Hypertension in her mother and son; Prostate cancer in her father.    Medications and Allergies     Medications  No outpatient medications have been marked as taking for the 10/25/23 encounter (Appointment) with Anthony Sorto MD.       Allergies  Review of patient's allergies indicates:  No Known Allergies    Physical Examination     There were no vitals filed for this visit.    Physical Exam           Signature:  Anthony Sorto MD  [facility]  [address] [facility] [address]  [phone]    Date of encounter: 10/25/23

## 2023-10-31 ENCOUNTER — EXTERNAL CHRONIC CARE MANAGEMENT (OUTPATIENT)
Dept: FAMILY MEDICINE | Facility: CLINIC | Age: 78
End: 2023-10-31
Payer: COMMERCIAL

## 2023-10-31 PROCEDURE — G0511 PR CHRONIC CARE MGMT, RHC OR FQHC ONLY, 20 MINS OR MORE: ICD-10-PCS | Mod: ,,, | Performed by: INTERNAL MEDICINE

## 2023-10-31 PROCEDURE — G0511 CCM/BHI BY RHC/FQHC 20MIN MO: HCPCS | Mod: ,,, | Performed by: INTERNAL MEDICINE

## 2023-11-02 ENCOUNTER — OFFICE VISIT (OUTPATIENT)
Dept: FAMILY MEDICINE | Facility: CLINIC | Age: 78
End: 2023-11-02
Payer: COMMERCIAL

## 2023-11-02 ENCOUNTER — OFFICE VISIT (OUTPATIENT)
Dept: CARDIOLOGY | Facility: CLINIC | Age: 78
End: 2023-11-02
Payer: COMMERCIAL

## 2023-11-02 VITALS
BODY MASS INDEX: 32.11 KG/M2 | TEMPERATURE: 97 F | HEIGHT: 67 IN | OXYGEN SATURATION: 97 % | DIASTOLIC BLOOD PRESSURE: 58 MMHG | HEART RATE: 71 BPM | SYSTOLIC BLOOD PRESSURE: 91 MMHG | RESPIRATION RATE: 18 BRPM

## 2023-11-02 VITALS
BODY MASS INDEX: 32.11 KG/M2 | SYSTOLIC BLOOD PRESSURE: 100 MMHG | OXYGEN SATURATION: 98 % | HEIGHT: 67 IN | DIASTOLIC BLOOD PRESSURE: 50 MMHG | HEART RATE: 69 BPM

## 2023-11-02 DIAGNOSIS — I10 ESSENTIAL HYPERTENSION: Chronic | ICD-10-CM

## 2023-11-02 DIAGNOSIS — Z95.1 S/P 2-VESSEL CORONARY ARTERY BYPASS: ICD-10-CM

## 2023-11-02 DIAGNOSIS — I42.9 CARDIOMYOPATHY, UNSPECIFIED TYPE: ICD-10-CM

## 2023-11-02 DIAGNOSIS — I50.42 CHRONIC COMBINED SYSTOLIC AND DIASTOLIC CONGESTIVE HEART FAILURE: ICD-10-CM

## 2023-11-02 DIAGNOSIS — E08.00 DIABETES MELLITUS DUE TO UNDERLYING CONDITION WITH HYPEROSMOLARITY WITHOUT COMA, WITHOUT LONG-TERM CURRENT USE OF INSULIN: ICD-10-CM

## 2023-11-02 DIAGNOSIS — I25.10 CORONARY ARTERY DISEASE, UNSPECIFIED VESSEL OR LESION TYPE, UNSPECIFIED WHETHER ANGINA PRESENT, UNSPECIFIED WHETHER NATIVE OR TRANSPLANTED HEART: Primary | ICD-10-CM

## 2023-11-02 DIAGNOSIS — M06.9 RHEUMATOID ARTHRITIS, INVOLVING UNSPECIFIED SITE, UNSPECIFIED WHETHER RHEUMATOID FACTOR PRESENT: ICD-10-CM

## 2023-11-02 PROCEDURE — 1160F PR REVIEW ALL MEDS BY PRESCRIBER/CLIN PHARMACIST DOCUMENTED: ICD-10-PCS | Mod: CPTII,,, | Performed by: NURSE PRACTITIONER

## 2023-11-02 PROCEDURE — 99214 OFFICE O/P EST MOD 30 MIN: CPT | Mod: S$PBB,,, | Performed by: NURSE PRACTITIONER

## 2023-11-02 PROCEDURE — 99215 OFFICE O/P EST HI 40 MIN: CPT | Mod: PBBFAC | Performed by: NURSE PRACTITIONER

## 2023-11-02 PROCEDURE — 3078F PR MOST RECENT DIASTOLIC BLOOD PRESSURE < 80 MM HG: ICD-10-PCS | Mod: CPTII,,, | Performed by: NURSE PRACTITIONER

## 2023-11-02 PROCEDURE — G0008 FLU VACCINE - QUADRIVALENT - ADJUVANTED: ICD-10-PCS | Mod: ,,, | Performed by: INTERNAL MEDICINE

## 2023-11-02 PROCEDURE — 1160F PR REVIEW ALL MEDS BY PRESCRIBER/CLIN PHARMACIST DOCUMENTED: ICD-10-PCS | Mod: ,,, | Performed by: INTERNAL MEDICINE

## 2023-11-02 PROCEDURE — 90694 FLU VACCINE - QUADRIVALENT - ADJUVANTED: ICD-10-PCS | Mod: ,,, | Performed by: INTERNAL MEDICINE

## 2023-11-02 PROCEDURE — 1159F PR MEDICATION LIST DOCUMENTED IN MEDICAL RECORD: ICD-10-PCS | Mod: CPTII,,, | Performed by: NURSE PRACTITIONER

## 2023-11-02 PROCEDURE — 3074F SYST BP LT 130 MM HG: CPT | Mod: ,,, | Performed by: INTERNAL MEDICINE

## 2023-11-02 PROCEDURE — 1159F MED LIST DOCD IN RCRD: CPT | Mod: CPTII,,, | Performed by: NURSE PRACTITIONER

## 2023-11-02 PROCEDURE — 1159F MED LIST DOCD IN RCRD: CPT | Mod: ,,, | Performed by: INTERNAL MEDICINE

## 2023-11-02 PROCEDURE — 90694 VACC AIIV4 NO PRSRV 0.5ML IM: CPT | Mod: ,,, | Performed by: INTERNAL MEDICINE

## 2023-11-02 PROCEDURE — 3074F PR MOST RECENT SYSTOLIC BLOOD PRESSURE < 130 MM HG: ICD-10-PCS | Mod: CPTII,,, | Performed by: NURSE PRACTITIONER

## 2023-11-02 PROCEDURE — 1101F PR PT FALLS ASSESS DOC 0-1 FALLS W/OUT INJ PAST YR: ICD-10-PCS | Mod: ,,, | Performed by: INTERNAL MEDICINE

## 2023-11-02 PROCEDURE — 3288F PR FALLS RISK ASSESSMENT DOCUMENTED: ICD-10-PCS | Mod: ,,, | Performed by: INTERNAL MEDICINE

## 2023-11-02 PROCEDURE — 3078F PR MOST RECENT DIASTOLIC BLOOD PRESSURE < 80 MM HG: ICD-10-PCS | Mod: ,,, | Performed by: INTERNAL MEDICINE

## 2023-11-02 PROCEDURE — 3078F DIAST BP <80 MM HG: CPT | Mod: CPTII,,, | Performed by: NURSE PRACTITIONER

## 2023-11-02 PROCEDURE — 3288F FALL RISK ASSESSMENT DOCD: CPT | Mod: ,,, | Performed by: INTERNAL MEDICINE

## 2023-11-02 PROCEDURE — 1159F PR MEDICATION LIST DOCUMENTED IN MEDICAL RECORD: ICD-10-PCS | Mod: ,,, | Performed by: INTERNAL MEDICINE

## 2023-11-02 PROCEDURE — G0008 ADMIN INFLUENZA VIRUS VAC: HCPCS | Mod: ,,, | Performed by: INTERNAL MEDICINE

## 2023-11-02 PROCEDURE — 1101F PT FALLS ASSESS-DOCD LE1/YR: CPT | Mod: ,,, | Performed by: INTERNAL MEDICINE

## 2023-11-02 PROCEDURE — 93010 EKG 12-LEAD: ICD-10-PCS | Mod: S$PBB,,, | Performed by: INTERNAL MEDICINE

## 2023-11-02 PROCEDURE — 93010 ELECTROCARDIOGRAM REPORT: CPT | Mod: S$PBB,,, | Performed by: INTERNAL MEDICINE

## 2023-11-02 PROCEDURE — 99214 PR OFFICE/OUTPT VISIT, EST, LEVL IV, 30-39 MIN: ICD-10-PCS | Mod: ,,, | Performed by: INTERNAL MEDICINE

## 2023-11-02 PROCEDURE — 1160F RVW MEDS BY RX/DR IN RCRD: CPT | Mod: ,,, | Performed by: INTERNAL MEDICINE

## 2023-11-02 PROCEDURE — 93005 ELECTROCARDIOGRAM TRACING: CPT | Mod: PBBFAC | Performed by: INTERNAL MEDICINE

## 2023-11-02 PROCEDURE — 3074F SYST BP LT 130 MM HG: CPT | Mod: CPTII,,, | Performed by: NURSE PRACTITIONER

## 2023-11-02 PROCEDURE — 99214 PR OFFICE/OUTPT VISIT, EST, LEVL IV, 30-39 MIN: ICD-10-PCS | Mod: S$PBB,,, | Performed by: NURSE PRACTITIONER

## 2023-11-02 PROCEDURE — 3074F PR MOST RECENT SYSTOLIC BLOOD PRESSURE < 130 MM HG: ICD-10-PCS | Mod: ,,, | Performed by: INTERNAL MEDICINE

## 2023-11-02 PROCEDURE — 3078F DIAST BP <80 MM HG: CPT | Mod: ,,, | Performed by: INTERNAL MEDICINE

## 2023-11-02 PROCEDURE — 1160F RVW MEDS BY RX/DR IN RCRD: CPT | Mod: CPTII,,, | Performed by: NURSE PRACTITIONER

## 2023-11-02 PROCEDURE — 99214 OFFICE O/P EST MOD 30 MIN: CPT | Mod: ,,, | Performed by: INTERNAL MEDICINE

## 2023-11-02 NOTE — PROGRESS NOTES
"PCP: Fco Chaparro MD    Referring Provider:     Subjective:   Delma Rae is a 78 y.o. female with hx of CAD/CABG 2004, combined systolic and diastolic heart failure, pulmonary hypertension, RA, DM, type 2, bilateral lower extremity venous insufficiency, polymyalgia rheumatica,  who presents to d/w the results of the echocardiogram and the /r/b/a of ICD implant.     8/1/2023 presents for 4 week follow up. She has tolerated the Farxiga and metoprolol without issues. She denies SOB and sleeps flat on no pillows.    6/21/2023 presents for routine follow-up.  Patient states she is doing well and denies complaints.    02/26/2023 presents for routine follow up. Patient's only complaint is lower ext edema that resolves at HS.She denies chest pain, pressure, heaviness, tightness, orthopnea (sleeps on one pillow), pnd, palpitations, syncope or near syncope. She states that she "walks a little just in her house" but has no issues when she is active. Patient's son reports that Dr. Chaparro put her back on the torsemide and the lasix. I will defer to the PCP.        5/10/2021 Patient's son reports that she was taking both lasix and torsemide until Sunday and ran out of lasix on Sunday. She has had no fluid retention since. We will stop the lasix and continue the torsemide. IF she begins having fluid retention her son is to increase the torsemide to bid dosing and call our office for further instructions.        Fhx:  Family History   Problem Relation Age of Onset    Diabetes Mother         age 60    Hypertension Mother     Diabetes Father     Prostate cancer Father     Cancer Father         Prostate, age 85    Coronary artery disease Sister         age 72    Cancer Brother     Coronary artery disease Brother     Hypertension Son         age 50     Shx:   Social History     Socioeconomic History    Marital status:    Tobacco Use    Smoking status: Never    Smokeless tobacco: Never   Substance and Sexual Activity    " Alcohol use: Never    Drug use: Never    Sexual activity: Not Currently     Partners: Male       EKG   2/28/2023 regular sinus rhythm with PACs, heart rate 78 beats per minute, left axis deviation, LVH with QRS widening and repolarization abnormality, lateral infarct, inferior infarct 4/26/2022 RSR with HR 80 bpm; LVH with repolarization abnormality;     Results for orders placed during the hospital encounter of 09/11/23    Echo    Interpretation Summary    Left Ventricle: The left ventricle is mildly dilated. Normal wall thickness. There is moderately reduced systolic function with a visually estimated ejection fraction of 30 - 35%.    Left Atrium: Left atrium is mildly dilated.    Right Ventricle: Normal right ventricular cavity size.    Aortic Valve: The aortic valve is a trileaflet valve. There is moderate aortic valve sclerosis. There is mild aortic regurgitation.    Mitral Valve: There is mild regurgitation.    Tricuspid Valve: There is mild regurgitation.     ECHO Results for orders placed during the hospital encounter of 08/03/21    Echo    Interpretation Summary  · The left ventricle is normal in size with moderately decreased systolic function.  · The estimated ejection fraction is 30%.  · Grade I left ventricular diastolic dysfunction.  · Mild right ventricular enlargement with normal right ventricular systolic function.  · Normal central venous pressure (3 mmHg).  · Mild mitral regurgitation.     Heart cath 7/17/2020  Dr. Perez  LAD: Prox LAD 90% stenosis with ABISAI III flow  LCX: mid Lcx- 40% with ABISAI III flow  RCA: distal RCA 35% stenosis  LVEF 35%  Recommendations: optimize medical management     7/2004 Dr. Leena TRIVEDI top the LAD and SVG to the OM1         Lab Results   Component Value Date     07/24/2023    K 3.8 07/24/2023     07/24/2023    CO2 37 (H) 07/24/2023    BUN 23 (H) 07/24/2023    CREATININE 1.38 (H) 07/24/2023    CALCIUM 9.4 07/24/2023    ANIONGAP 7 07/24/2023     ESTGFRAFRICA 58 (L) 09/14/2021    EGFRNONAA 59 07/18/2020       Lab Results   Component Value Date    CHOL 165 01/09/2023     Lab Results   Component Value Date    HDL 66 (H) 01/09/2023     Lab Results   Component Value Date    LDLCALC 81 01/09/2023     Lab Results   Component Value Date    TRIG 92 01/09/2023     Lab Results   Component Value Date    CHOLHDL 2.5 01/09/2023       Lab Results   Component Value Date    WBC 11.00 09/21/2022    HGB 10.7 (L) 09/21/2022    HCT 33.8 (L) 09/21/2022    MCV 76.5 (L) 09/21/2022     09/21/2022           Current Outpatient Medications:     albuterol (PROVENTIL/VENTOLIN HFA) 90 mcg/actuation inhaler, Inhale 2 puffs into the lungs every 4 (four) hours as needed for Wheezing or Shortness of Breath., Disp: 18 g, Rfl: 1    aspirin (ECOTRIN) 81 MG EC tablet, Take 81 mg by mouth once daily., Disp: , Rfl:     atorvastatin (LIPITOR) 40 MG tablet, Take 1 tablet (40 mg total) by mouth once daily., Disp: 90 tablet, Rfl: 1    blood sugar diagnostic (ONETOUCH ULTRA BLUE TEST STRIP) Strp, 1 strip by Other route 4 (four) times daily before meals and nightly. Dx e11.9, Disp: 200 strip, Rfl: 5    brimonidine 0.2% (ALPHAGAN) 0.2 % Drop, Place 1 drop into both eyes 2 (two) times daily., Disp: , Rfl:     dapagliflozin propanediol (FARXIGA) 10 mg tablet, Take 1 tablet (10 mg total) by mouth once daily., Disp: 30 tablet, Rfl: 11    ferrous sulfate 325 (65 FE) MG EC tablet, Take 1 tablet (325 mg total) by mouth once daily., Disp: 90 tablet, Rfl: 1    fluticasone furoate-vilanteroL (BREO ELLIPTA) 100-25 mcg/dose diskus inhaler, Inhale 1 puff into the lungs daily as needed., Disp: , Rfl:     folic acid (FOLVITE) 1 MG tablet, Take 1 tablet (1 mg total) by mouth once daily., Disp: 90 tablet, Rfl: 1    furosemide (LASIX) 40 MG tablet, Take 40 mg by mouth 3 (three) times daily., Disp: , Rfl:     hydrALAZINE (APRESOLINE) 25 MG tablet, Take 1 tablet (25 mg total) by mouth 3 (three) times daily., Disp:  "270 tablet, Rfl: 1    hydroxychloroquine (PLAQUENIL) 200 mg tablet, Take 1 tablet (200 mg total) by mouth once daily., Disp: 90 tablet, Rfl: 1    insulin NPH-insulin regular, 70/30, (NOVOLIN 70/30 U-100 INSULIN) 100 unit/mL (70-30) injection, INJECT 40 UNITS UNDER SKIN IN THE MORNING AND 15 IN THE EVENING., Disp: 50 mL, Rfl: 6    insulin syringe-needle U-100 1 mL 31 gauge x 5/16 Syrg, Inject 1 each into the skin 2 (two) times a day. Dx e11.9, Disp: 200 each, Rfl: 3    insulin syringe-needle U-100 1/2 mL 31 gauge x 15/64" Syrg, by Misc.(Non-Drug; Combo Route) route. Use as directed with insulin, Disp: , Rfl:     lancets Misc, 1 each by skin prick route 4 (four) times daily before meals and nightly., Disp: 200 each, Rfl: 5    latanoprost 0.005 % ophthalmic solution, Place 1 drop into both eyes once daily., Disp: , Rfl:     loratadine (CLARITIN) 10 mg tablet, Take 1 tablet (10 mg total) by mouth once daily., Disp: 90 tablet, Rfl: 1    losartan (COZAAR) 100 MG tablet, Take 1 tablet (100 mg total) by mouth once daily., Disp: 90 tablet, Rfl: 5    methotrexate 2.5 MG Tab, Take 5 tablets (12.5 mg total) by mouth every 7 days. 5 tablets q7 days, Disp: 60 tablet, Rfl: 1    metoprolol succinate (TOPROL-XL) 100 MG 24 hr tablet, Take 1 tablet (100 mg total) by mouth once daily., Disp: 30 tablet, Rfl: 5    multivitamin/iron/folic acid (CENTRUM ULTRA WOMEN'S ORAL), Take 1 tablet by mouth once daily., Disp: , Rfl:     naproxen (NAPROSYN) 500 MG tablet, Take 1 tablet (500 mg total) by mouth 2 (two) times daily as needed., Disp: 20 tablet, Rfl: 1    omeprazole (PRILOSEC) 20 MG capsule, Take 1 capsule (20 mg total) by mouth once daily., Disp: 90 capsule, Rfl: 1    pantoprazole (PROTONIX) 40 MG tablet, Take 40 mg by mouth once daily., Disp: , Rfl:     potassium chloride SA (K-DUR,KLOR-CON M) 10 MEQ tablet, Take 1 tablet (10 mEq total) by mouth 2 (two) times daily., Disp: 60 tablet, Rfl: 3    predniSONE (DELTASONE) 2.5 MG tablet, Take " "2.5 mg by mouth once daily at 6am., Disp: , Rfl:     torsemide (DEMADEX) 20 MG Tab, Take 2 tablets (40 mg total) by mouth every morning., Disp: 180 tablet, Rfl: 1    venlafaxine (EFFEXOR-XR) 75 MG 24 hr capsule, Take 1 capsule (75 mg total) by mouth every other day., Disp: 45 capsule, Rfl: 1    zolpidem (AMBIEN) 5 MG Tab, Take 1 tablet (5 mg total) by mouth nightly as needed., Disp: 1 tablet, Rfl: 0    amitriptyline (ELAVIL) 10 MG tablet, Take 1 tablet by mouth every evening., Disp: , Rfl:     amLODIPine (NORVASC) 5 MG tablet, 1 tablet Orally Once a day, Disp: , Rfl:     amoxicillin (AMOXIL) 500 MG capsule, Take 500 mg by mouth 2 (two) times daily., Disp: , Rfl:     FLUTICASONE PROPIONATE NASL, 1 spray by Each Nostril route daily as needed., Disp: , Rfl:     insulin asp prt-insulin aspart, NovoLOG 70/30, (NOVOLOG 70/30) 100 unit/mL (70-30) Soln, Subcutaneous, Disp: , Rfl:     meloxicam (MOBIC) 7.5 MG tablet, Take 1 tablet (7.5 mg total) by mouth once daily., Disp: 30 tablet, Rfl: 1    multivit-min-FA-lycopen-lutein (CENTRUM SILVER) 0.4 mg-300 mcg- 250 mcg Tab, Take 1 tablet by mouth once daily., Disp: , Rfl:     pregabalin (LYRICA) 50 MG capsule, Take 1 capsule by mouth 3 (three) times daily., Disp: , Rfl:     triamcinolone acetonide 0.025% (KENALOG) 0.025 % cream, Apply topically 2 (two) times daily., Disp: 454 g, Rfl: 2  Meds reviewed and reconciled.      Review of Systems   Respiratory:  Negative for shortness of breath.    Cardiovascular:  Negative for chest pain, palpitations, orthopnea, claudication, leg swelling and PND.   Neurological:  Negative for dizziness, loss of consciousness and weakness.           Objective:   BP (!) 100/50 (BP Location: Left arm, Patient Position: Sitting)   Pulse 69   Ht 5' 7" (1.702 m)   SpO2 98%   BMI 32.11 kg/m²     Physical Exam  Vitals and nursing note reviewed.   Constitutional:       Appearance: Normal appearance. She is normal weight.      Comments: In a wheelchair "   HENT:      Head: Normocephalic and atraumatic.   Neck:      Vascular: No carotid bruit.   Cardiovascular:      Rate and Rhythm: Normal rate and regular rhythm.      Pulses: Normal pulses.      Heart sounds: Normal heart sounds.   Pulmonary:      Effort: Pulmonary effort is normal.      Breath sounds: Normal breath sounds.   Abdominal:      Palpations: Abdomen is soft.   Musculoskeletal:      Cervical back: Neck supple.      Right lower leg: No edema.      Left lower leg: No edema.   Skin:     General: Skin is warm and dry.      Capillary Refill: Capillary refill takes less than 2 seconds.   Neurological:      General: No focal deficit present.      Mental Status: She is alert.           Assessment:     1. Coronary artery disease, unspecified vessel or lesion type, unspecified whether angina present, unspecified whether native or transplanted heart  EKG 12-lead    EKG 12-lead      2. Chronic combined systolic and diastolic congestive heart failure        3. Essential hypertension        4. S/P 2-vessel coronary artery bypass              Plan:   Coronary artery disease  CABG 2004  Asymptomatic  Continue AS/Lipitor    Essential hypertension  100/50 mmHg    MI (myocardial infarction)  7/2004    S/P 2-vessel coronary artery bypass  7/2004 Dr. Leena TRIVEDI top the LAD and SVG to the OM1          RTC: as scheduled 12/6/2023

## 2023-11-07 RX ORDER — PHENOL 1.4 %
AEROSOL, SPRAY (ML) MUCOUS MEMBRANE 2 TIMES DAILY
Qty: 177 ML | Refills: 0 | Status: SHIPPED | OUTPATIENT
Start: 2023-11-07

## 2023-11-07 RX ORDER — SYRINGE,SAFETY WITH NEEDLE,1ML 25GX1"
1 SYRINGE (EA) MISCELLANEOUS 2 TIMES DAILY
Qty: 200 EACH | Refills: 3 | Status: SHIPPED | OUTPATIENT
Start: 2023-11-07

## 2023-11-07 RX ORDER — AMOXICILLIN AND CLAVULANATE POTASSIUM 500; 125 MG/1; MG/1
1 TABLET, FILM COATED ORAL 2 TIMES DAILY
Qty: 14 TABLET | Refills: 0 | Status: SHIPPED | OUTPATIENT
Start: 2023-11-07 | End: 2024-01-29 | Stop reason: ALTCHOICE

## 2023-11-07 RX ORDER — PREDNISONE 2.5 MG/1
2.5 TABLET ORAL DAILY
Qty: 60 TABLET | Refills: 1 | Status: SHIPPED | OUTPATIENT
Start: 2023-11-07

## 2023-11-07 RX ORDER — OMEPRAZOLE 20 MG/1
20 CAPSULE, DELAYED RELEASE ORAL DAILY
Qty: 90 CAPSULE | Refills: 1 | Status: SHIPPED | OUTPATIENT
Start: 2023-11-07

## 2023-11-07 RX ORDER — FLUTICASONE PROPIONATE 50 MCG
1 SPRAY, SUSPENSION (ML) NASAL DAILY PRN
Qty: 16 G | Refills: 1 | Status: SHIPPED | OUTPATIENT
Start: 2023-11-07

## 2023-11-07 RX ORDER — TORSEMIDE 20 MG/1
40 TABLET ORAL EVERY MORNING
Qty: 180 TABLET | Refills: 1 | Status: SHIPPED | OUTPATIENT
Start: 2023-11-07

## 2023-11-07 RX ORDER — ATORVASTATIN CALCIUM 40 MG/1
40 TABLET, FILM COATED ORAL DAILY
Qty: 90 TABLET | Refills: 1 | Status: SHIPPED | OUTPATIENT
Start: 2023-11-07 | End: 2024-01-29

## 2023-11-07 RX ORDER — DAPAGLIFLOZIN 10 MG/1
10 TABLET, FILM COATED ORAL DAILY
Qty: 30 TABLET | Refills: 11 | Status: SHIPPED | OUTPATIENT
Start: 2023-11-07

## 2023-11-07 RX ORDER — AMITRIPTYLINE HYDROCHLORIDE 10 MG/1
10 TABLET, FILM COATED ORAL NIGHTLY
Qty: 90 TABLET | Refills: 1 | Status: SHIPPED | OUTPATIENT
Start: 2023-11-07

## 2023-11-07 RX ORDER — FUROSEMIDE 40 MG/1
40 TABLET ORAL 3 TIMES DAILY
Qty: 90 TABLET | Refills: 1 | Status: SHIPPED | OUTPATIENT
Start: 2023-11-07

## 2023-11-07 RX ORDER — TRIAMCINOLONE ACETONIDE 0.25 MG/G
CREAM TOPICAL 2 TIMES DAILY
Qty: 454 G | Refills: 2 | Status: SHIPPED | OUTPATIENT
Start: 2023-11-07 | End: 2023-12-19 | Stop reason: SDUPTHER

## 2023-11-07 RX ORDER — VENLAFAXINE HYDROCHLORIDE 75 MG/1
75 CAPSULE, EXTENDED RELEASE ORAL EVERY OTHER DAY
Qty: 45 CAPSULE | Refills: 1 | Status: SHIPPED | OUTPATIENT
Start: 2023-11-07

## 2023-11-07 RX ORDER — AMLODIPINE BESYLATE 5 MG/1
5 TABLET ORAL DAILY
Qty: 90 TABLET | Refills: 1 | Status: SHIPPED | OUTPATIENT
Start: 2023-11-07

## 2023-11-07 RX ORDER — POTASSIUM CHLORIDE 750 MG/1
10 TABLET, EXTENDED RELEASE ORAL 2 TIMES DAILY
Qty: 60 TABLET | Refills: 3 | Status: SHIPPED | OUTPATIENT
Start: 2023-11-07

## 2023-11-07 RX ORDER — PANTOPRAZOLE SODIUM 40 MG/1
40 TABLET, DELAYED RELEASE ORAL DAILY
Qty: 90 TABLET | Refills: 1 | Status: SHIPPED | OUTPATIENT
Start: 2023-11-07

## 2023-11-07 RX ORDER — HYDROXYCHLOROQUINE SULFATE 200 MG/1
200 TABLET, FILM COATED ORAL DAILY
Qty: 90 TABLET | Refills: 1 | Status: SHIPPED | OUTPATIENT
Start: 2023-11-07 | End: 2023-12-19 | Stop reason: SDUPTHER

## 2023-11-07 RX ORDER — HYDRALAZINE HYDROCHLORIDE 25 MG/1
25 TABLET, FILM COATED ORAL 3 TIMES DAILY
Qty: 270 TABLET | Refills: 1 | Status: SHIPPED | OUTPATIENT
Start: 2023-11-07

## 2023-11-08 PROBLEM — E08.00 DIABETES MELLITUS DUE TO UNDERLYING CONDITION WITH HYPEROSMOLARITY WITHOUT COMA, WITHOUT LONG-TERM CURRENT USE OF INSULIN: Status: ACTIVE | Noted: 2023-11-08

## 2023-11-08 NOTE — PROGRESS NOTES
Subjective:       Patient ID: Delma Rae is a 78 y.o. female.    Chief Complaint: Medication Refill  Patient seen and evaluated.  Patient complains of a cough shortness a breath and nasal stuffiness today.  Patient also complains of a sore throat today.  Patient has multiple medical problems she has chronic dyslipidemia she needs a refill of Lipitor patient also has chronic hypertension.  And chronic diabetes going to refill Farxiga.  A throat is erythematous.  Will start Chloraseptic spray b.i.d. prednisone 2.5 mg 1 p.o. q.day for chronic arthritis and she does have polymyalgia rheumatica in the prednisone works well for.  Because she has a cough shortness of breath and a erythematous stroke will start Augmentin Augmentin 500 mg 1 p.o. b.i.d..  Patient does have signs and symptoms of allergic rhinitis the plan Flonase 1 spray each nostril q.day.  Medication Refill  Pertinent negatives include no abdominal pain, chest pain, fatigue or fever.     .    Current Medications:    Current Outpatient Medications:     albuterol (PROVENTIL/VENTOLIN HFA) 90 mcg/actuation inhaler, Inhale 2 puffs into the lungs every 4 (four) hours as needed for Wheezing or Shortness of Breath., Disp: 18 g, Rfl: 1    amoxicillin (AMOXIL) 500 MG capsule, Take 500 mg by mouth 2 (two) times daily., Disp: , Rfl:     aspirin (ECOTRIN) 81 MG EC tablet, Take 81 mg by mouth once daily., Disp: , Rfl:     brimonidine 0.2% (ALPHAGAN) 0.2 % Drop, Place 1 drop into both eyes 2 (two) times daily., Disp: , Rfl:     ferrous sulfate 325 (65 FE) MG EC tablet, Take 1 tablet (325 mg total) by mouth once daily., Disp: 90 tablet, Rfl: 1    fluticasone furoate-vilanteroL (BREO ELLIPTA) 100-25 mcg/dose diskus inhaler, Inhale 1 puff into the lungs daily as needed., Disp: , Rfl:     fluticasone propionate (FLONASE) 50 mcg/actuation nasal spray, 1 spray (50 mcg total) by Each Nostril route daily as needed for Rhinitis., Disp: 16 g, Rfl: 1    folic acid (FOLVITE) 1  "MG tablet, Take 1 tablet (1 mg total) by mouth once daily., Disp: 90 tablet, Rfl: 1    insulin asp prt-insulin aspart, NovoLOG 70/30, (NOVOLOG 70/30) 100 unit/mL (70-30) Soln, Subcutaneous, Disp: , Rfl:     insulin NPH-insulin regular, 70/30, (NOVOLIN 70/30 U-100 INSULIN) 100 unit/mL (70-30) injection, INJECT 40 UNITS UNDER SKIN IN THE MORNING AND 15 IN THE EVENING., Disp: 50 mL, Rfl: 6    insulin syringe-needle U-100 1/2 mL 31 gauge x 15/64" Syrg, by Misc.(Non-Drug; Combo Route) route. Use as directed with insulin, Disp: , Rfl:     lancets Misc, 1 each by skin prick route 4 (four) times daily before meals and nightly., Disp: 200 each, Rfl: 5    latanoprost 0.005 % ophthalmic solution, Place 1 drop into both eyes once daily., Disp: , Rfl:     loratadine (CLARITIN) 10 mg tablet, Take 1 tablet (10 mg total) by mouth once daily., Disp: 90 tablet, Rfl: 1    losartan (COZAAR) 100 MG tablet, Take 1 tablet (100 mg total) by mouth once daily., Disp: 90 tablet, Rfl: 5    meloxicam (MOBIC) 7.5 MG tablet, Take 1 tablet (7.5 mg total) by mouth once daily., Disp: 30 tablet, Rfl: 1    methotrexate 2.5 MG Tab, Take 5 tablets (12.5 mg total) by mouth every 7 days. 5 tablets q7 days, Disp: 60 tablet, Rfl: 1    metoprolol succinate (TOPROL-XL) 100 MG 24 hr tablet, Take 1 tablet (100 mg total) by mouth once daily., Disp: 30 tablet, Rfl: 5    multivit-min-FA-lycopen-lutein (CENTRUM SILVER) 0.4 mg-300 mcg- 250 mcg Tab, Take 1 tablet by mouth once daily., Disp: , Rfl:     multivitamin/iron/folic acid (CENTRUM ULTRA WOMEN'S ORAL), Take 1 tablet by mouth once daily., Disp: , Rfl:     naproxen (NAPROSYN) 500 MG tablet, Take 1 tablet (500 mg total) by mouth 2 (two) times daily as needed., Disp: 20 tablet, Rfl: 1    predniSONE (DELTASONE) 2.5 MG tablet, Take 1 tablet (2.5 mg total) by mouth once daily., Disp: 60 tablet, Rfl: 1    pregabalin (LYRICA) 50 MG capsule, Take 1 capsule by mouth 3 (three) times daily., Disp: , Rfl:     zolpidem " (AMBIEN) 5 MG Tab, Take 1 tablet (5 mg total) by mouth nightly as needed., Disp: 1 tablet, Rfl: 0    amitriptyline (ELAVIL) 10 MG tablet, Take 1 tablet (10 mg total) by mouth every evening., Disp: 90 tablet, Rfl: 1    amLODIPine (NORVASC) 5 MG tablet, Take 1 tablet (5 mg total) by mouth once daily., Disp: 90 tablet, Rfl: 1    amoxicillin-clavulanate 500-125mg (AUGMENTIN) 500-125 mg Tab, Take 1 tablet (500 mg total) by mouth 2 (two) times daily., Disp: 14 tablet, Rfl: 0    atorvastatin (LIPITOR) 40 MG tablet, Take 1 tablet (40 mg total) by mouth once daily., Disp: 90 tablet, Rfl: 1    blood sugar diagnostic (ONETOUCH ULTRA BLUE TEST STRIP) Strp, 1 strip by Other route 4 (four) times daily before meals and nightly. Dx e11.9, Disp: 200 strip, Rfl: 5    dapagliflozin propanediol (FARXIGA) 10 mg tablet, Take 1 tablet (10 mg total) by mouth once daily., Disp: 30 tablet, Rfl: 11    furosemide (LASIX) 40 MG tablet, Take 1 tablet (40 mg total) by mouth 3 (three) times daily., Disp: 90 tablet, Rfl: 1    hydrALAZINE (APRESOLINE) 25 MG tablet, Take 1 tablet (25 mg total) by mouth 3 (three) times daily., Disp: 270 tablet, Rfl: 1    hydroxychloroquine (PLAQUENIL) 200 mg tablet, Take 1 tablet (200 mg total) by mouth once daily., Disp: 90 tablet, Rfl: 1    insulin syringe-needle U-100 1 mL 31 gauge x 5/16 Syrg, Inject 1 each into the skin 2 (two) times a day. Dx e11.9, Disp: 200 each, Rfl: 3    omeprazole (PRILOSEC) 20 MG capsule, Take 1 capsule (20 mg total) by mouth once daily., Disp: 90 capsule, Rfl: 1    pantoprazole (PROTONIX) 40 MG tablet, Take 1 tablet (40 mg total) by mouth once daily., Disp: 90 tablet, Rfl: 1    phenoL (CHLORASEPTIC THROAT SPRAY) 1.4 % SprA, by Mucous Membrane route 2 (two) times a day., Disp: 177 mL, Rfl: 0    potassium chloride SA (K-DUR,KLOR-CON M) 10 MEQ tablet, Take 1 tablet (10 mEq total) by mouth 2 (two) times daily., Disp: 60 tablet, Rfl: 3    torsemide (DEMADEX) 20 MG Tab, Take 2 tablets (40 mg  "total) by mouth every morning., Disp: 180 tablet, Rfl: 1    triamcinolone acetonide 0.025% (KENALOG) 0.025 % cream, Apply topically 2 (two) times daily., Disp: 454 g, Rfl: 2    venlafaxine (EFFEXOR-XR) 75 MG 24 hr capsule, Take 1 capsule (75 mg total) by mouth every other day., Disp: 45 capsule, Rfl: 1           Review of Systems   Constitutional:  Negative for appetite change, fatigue and fever.   Respiratory:  Negative for shortness of breath.    Cardiovascular:  Negative for chest pain.   Gastrointestinal:  Negative for abdominal pain and constipation.   Endocrine: Negative for polydipsia, polyphagia and polyuria.   Genitourinary:  Negative for difficulty urinating, frequency and hot flashes.   Allergic/Immunologic: Negative for environmental allergies.   Neurological:  Negative for dizziness and light-headedness.   Psychiatric/Behavioral:  Negative for agitation.                 Vitals:    11/02/23 1612 11/02/23 1646   BP: (!) 96/59 (!) 91/58   BP Location: Right arm    Patient Position: Sitting    BP Method: Large (Automatic)    Pulse: 71    Resp: 18    Temp: 97 °F (36.1 °C)    TempSrc: Temporal    SpO2: 97%    Weight: Comment: wheel chair    Height: 5' 7" (1.702 m)         Physical Exam  Vitals and nursing note reviewed.   Constitutional:       Appearance: Normal appearance.   Cardiovascular:      Rate and Rhythm: Normal rate and regular rhythm.      Pulses: Normal pulses.      Heart sounds: Normal heart sounds.   Pulmonary:      Effort: Pulmonary effort is normal.      Breath sounds: Normal breath sounds.   Abdominal:      General: Abdomen is flat. Bowel sounds are normal.      Palpations: Abdomen is soft.   Musculoskeletal:         General: Normal range of motion.   Skin:     General: Skin is warm and dry.   Neurological:      General: No focal deficit present.      Mental Status: She is alert and oriented to person, place, and time. Mental status is at baseline.           Last Labs:     No visits with " results within 1 Month(s) from this visit.   Latest known visit with results is:   Hospital Outpatient Visit on 09/11/2023   Component Date Value    LVOT stroke volume 09/11/2023 55.74     LVIDd 09/11/2023 5.99     LV Systolic Volume 09/11/2023 147.60     LV Systolic Volume Index 09/11/2023 72.4     LVIDs 09/11/2023 5.50 (A)     LV Diastolic Volume 09/11/2023 179.11     LV Diastolic Volume Index 09/11/2023 87.80     IVS 09/11/2023 0.83     LVOT diameter 09/11/2023 2.10     LVOT area 09/11/2023 3.5     FS 09/11/2023 8 (A)     Left Ventricle Relative * 09/11/2023 0.27     Posterior Wall 09/11/2023 0.81     LV mass 09/11/2023 191.36     LV Mass Index 09/11/2023 94     MV Peak E Robb 09/11/2023 1.00     TDI LATERAL 09/11/2023 0.08     TDI SEPTAL 09/11/2023 0.05     E/E' ratio 09/11/2023 15.38     TR Max Robb 09/11/2023 2.37     E wave deceleration time 09/11/2023 141.15     LV SEPTAL E/E' RATIO 09/11/2023 20.00     LV LATERAL E/E' RATIO 09/11/2023 12.50     LVOT peak robb 09/11/2023 0.87     Left Ventricular Outflow* 09/11/2023 0.60     Left Ventricular Outflow* 09/11/2023 1.59     LA volume (mod) 09/11/2023 59.01     LA Volume Index (Mod) 09/11/2023 28.9     Right ventricular length* 09/11/2023 6.11     RV mid diameter 09/11/2023 2.37     TAPSE 09/11/2023 1.95     RA area 09/11/2023 17.5     Right Atrium Volume Syst* 09/11/2023 50.73     AV regurgitation pressur* 09/11/2023 [id number]     AR Max Robb 09/11/2023 2.51     AV mean gradient 09/11/2023 4     AV peak gradient 09/11/2023 7     Ao peak robb 09/11/2023 1.31     Ao VTI 09/11/2023 27.00     LVOT peak VTI 09/11/2023 16.10     AV valve area 09/11/2023 2.06     AV Velocity Ratio 09/11/2023 0.66     AV index (prosthetic) 09/11/2023 0.60     CORTEZ by Velocity Ratio 09/11/2023 2.30     Mr max robb 09/11/2023 4.35     MV stenosis pressure 1/2* 09/11/2023 40.93     MV valve area p 1/2 meth* 09/11/2023 5.38     Triscuspid Valve Regurgi* 09/11/2023 22     Ao root annulus  09/11/2023 2.22     Mean e' 09/11/2023 0.07     ZLVIDS 09/11/2023 3.08     ZLVIDD 09/11/2023 -0.15     AORTIC VALVE CUSP SEPERA* 09/11/2023 1.93     LA size 09/11/2023 4.3     RVDD 09/11/2023 4.00     RV-nascimento mid d 09/11/2023 2.4     RV-nascimento length 09/11/2023 6.1     BSA 09/11/2023 2.1        Last Imaging:  Echo    Left Ventricle: The left ventricle is mildly dilated. Normal wall   thickness. There is moderately reduced systolic function with a visually   estimated ejection fraction of 30 - 35%.    Left Atrium: Left atrium is mildly dilated.    Right Ventricle: Normal right ventricular cavity size.    Aortic Valve: The aortic valve is a trileaflet valve. There is moderate   aortic valve sclerosis. There is mild aortic regurgitation.    Mitral Valve: There is mild regurgitation.    Tricuspid Valve: There is mild regurgitation.         **Labs and x-rays personally reviewed by me    ** reviewed      Objective:        Assessment:       1. Rheumatoid arthritis, involving unspecified site, unspecified whether rheumatoid factor present  hydroxychloroquine (PLAQUENIL) 200 mg tablet      2. Cardiomyopathy, unspecified type  dapagliflozin propanediol (FARXIGA) 10 mg tablet      3. Diabetes mellitus due to underlying condition with hyperosmolarity without coma, without long-term current use of insulin  blood sugar diagnostic (ONETOUCH ULTRA BLUE TEST STRIP) Strp           Plan:         [unfilled]

## 2023-11-30 ENCOUNTER — EXTERNAL CHRONIC CARE MANAGEMENT (OUTPATIENT)
Dept: FAMILY MEDICINE | Facility: CLINIC | Age: 78
End: 2023-11-30
Payer: COMMERCIAL

## 2023-11-30 PROCEDURE — G0511 CCM/BHI BY RHC/FQHC 20MIN MO: HCPCS | Mod: ,,, | Performed by: INTERNAL MEDICINE

## 2023-11-30 PROCEDURE — G0511 PR CHRONIC CARE MGMT, RHC OR FQHC ONLY, 20 MINS OR MORE: ICD-10-PCS | Mod: ,,, | Performed by: INTERNAL MEDICINE

## 2023-12-11 ENCOUNTER — EXTERNAL HOME HEALTH (OUTPATIENT)
Dept: HOME HEALTH SERVICES | Facility: HOSPITAL | Age: 78
End: 2023-12-11
Payer: COMMERCIAL

## 2023-12-13 ENCOUNTER — TELEPHONE (OUTPATIENT)
Dept: RHEUMATOLOGY | Facility: CLINIC | Age: 78
End: 2023-12-13
Payer: COMMERCIAL

## 2023-12-13 NOTE — TELEPHONE ENCOUNTER
[facility], [place] VASCULAR SURGERY   SURGERY CONSULT NOTE    CONSULTING PHYSICIAN:  Samuel Mirza DO  REFERRING PHYSICIAN:  Brendan Castillo DO       CHIEF COMPLAINT:  Bilateral popliteal artery aneurysms, found incidentally on venous duplex exam for DVT r/o    SUBJECTIVE:    I was asked to see Andrew Hale at the request of Dr. Castillo for bilateral popliteal artery aneurysm consultation.   Andrew Hale is a 83 year old male patient admitted with acute hypoxia respiratory insufficiency with RLL pneumonia and COVID19 positivity.  The patient has hx significant for COPD and prostate cancer on immunotherapy.  He is currently on 4L NC.  The patient also has been having bilateral lower extremity edema for \"a long time\".  Venous duplex of the lower extremities were obtain which were negative for DVT.  Bilateral popliteal artery aneurysms were found incidentally and vascular surgery is consulted for evaluation.      When evaluated, the patient denies bilateral lower extremity complaints.  He has chronic weakness of the bilateral legs, however, denies claudication, rest pain or wounds.   He is on simvastatin 20mg at home.  No ASA or anticoagulation at home.  Patient states he has been vaccinated for COVID19 plus 2 boosters.     PROBLEM LIST:    Patient Active Problem List   Diagnosis   • Other emphysema (CMD)   • SOB (shortness of breath)   • Acute respiratory failure with hypoxia (CMD)       HISTORIES:    ALLERGIES:   Allergen Reactions   • Latex Dermatitis, RASH and SWELLING   • Sulfa Antibiotics HIVES     Current Facility-Administered Medications   Medication Dose Route Frequency Provider Last Rate Last Admin   • sodium chloride 0.9 % injector flush 100 mL  100 mL Intravenous PRN Marcelo Jones MD   100 mL at 07/31/23 1521   • [START ON 8/1/2023] cefTRIAXone (ROCEPHIN) syringe 2,000 mg  2,000 mg Intravenous Daily Brendan Castillo DO       • [START ON 8/1/2023] azithromycin (ZITHROMAX) 500 mg in sodium  Patient scheduled for CT scan x2 and has no showed both appointments. Follow up with Dr Sorto on 19DEC23   chloride 0.9 % 250 mL IVPB  500 mg Intravenous Daily Brendan Castillo, DO       • ipratropium-albuterol (DUONEB) 0.5-2.5 (3) MG/3ML nebulizer solution 3 mL  3 mL Nebulization Q4H Resp PRN Brendan Castillo DO       • albuterol inhaler 2 puff  2 puff Inhalation Q4H Resp PRN Brendan Castillo DO       • guaiFENesin (MUCINEX) ER tablet 1,200 mg  1,200 mg Oral 2 times per day Brendan Castillo DO   1,200 mg at 07/31/23 1828   • guaiFENesin-codeine (GUAIFENESIN AC) 100-10 MG/5ML liquid 5 mL  5 mL Oral Q4H PRN Brendan Castillo DO       • benzonatate (TESSALON PERLES) capsule 100 mg  100 mg Oral TID PRN Brendan Castillo DO       • sodium chloride 0.9 % flush bag 25 mL  25 mL Intravenous PRN Brendan Castillo DO       • sodium chloride (PF) 0.9 % injection 2 mL  2 mL Intracatheter 2 times per day Brendan Castillo DO       • sodium chloride (NORMAL SALINE) 0.9 % bolus 500 mL  500 mL Intravenous PRN Brendan Castillo DO       • sodium chloride 0.9 % flush bag 25 mL  25 mL Intravenous PRN Brendan Castillo DO       • sodium chloride 0.9% infusion   Intravenous Continuous Brendan Castillo  mL/hr at 07/31/23 1822 New Bag at 07/31/23 1822   • ondansetron (ZOFRAN) injection 4 mg  4 mg Intravenous BID PRN Brendan Castillo DO       • acetaminophen (TYLENOL) tablet 650 mg  650 mg Oral Q4H PRN Brendan Castillo DO       • polyethylene glycol (MIRALAX) packet 17 g  17 g Oral Daily PRN Brendan Castillo, DO       • docusate sodium-sennosides (SENOKOT S) 50-8.6 MG 2 tablet  2 tablet Oral Daily PRN Brendan Castillo DO       • dexAMETHasone (DECADRON) injection 10 mg  10 mg Intravenous BID Brendan Castillo DO   10 mg at 07/31/23 1824   • [START ON 8/1/2023] remdesivir (VEKLURY) 100 mg in sodium chloride 0.9 % 250 mL total volume infusion  100 mg Intravenous Daily at noon Brendan Castillo DO       • [START ON 8/1/2023] aspirin (ECOTRIN) enteric  coated tablet 81 mg  81 mg Oral Daily Brendan Castillo DO       • [START ON 8/1/2023] pantoprazole (PROTONIX) EC tablet 40 mg  40 mg Oral QAM AC Brendan Castillo DO       • atorvastatin (LIPITOR) tablet 80 mg  80 mg Oral Nightly Brendan Castillo DO       • heparin (porcine) injection 9,600 Units  80 Units/kg (Dosing Weight) Intravenous Once Brendan Castillo DO       • heparin (porcine) 25,000 units/250 mL in dextrose 5 % infusion  1-40 Units/kg/hr (Dosing Weight) Intravenous Continuous Brendan Castillo DO       • heparin (porcine) injection 9,600 Units  80 Units/kg (Dosing Weight) Intravenous PRN Brendan Castillo DO       • heparin (porcine) injection 4,800 Units  40 Units/kg (Dosing Weight) Intravenous PRN Brendan Castillo DO         Past Medical History:   Diagnosis Date   • Anxiety    • Arthritis    • COPD (chronic obstructive pulmonary disease) (CMD)    • COVID    • Emphysema of lung (CMD)    • Malignant neoplasm (CMD)      No past surgical history on file.  Social History     Tobacco Use   Smoking Status Former   • Current packs/day: 0.00   Smokeless Tobacco Never     Social History     Substance and Sexual Activity   Alcohol Use Not Currently       REVIEW OF SYSTEMS:    Constitutional:  Patient denies fever, chills, tiredness or malaise.    Eyes:  Denies change in visual acuity, pain, burning or itching.    Immunologic:  Denies hives, seasonal allergies.    HENT:  Denies sinus problems, ear infections, nasal congestion or sore throat.    Respiratory:  Denies cough, shortness of breath.    Cardiovascular:  Denies chest pain, edema.    Gastrointestinal:  Denies abdominal pain, nausea, vomiting, bloody stools or diarrhea.    Genitourinary:  Denies urine retention, painful urination, urinary frequency, blood in urine or nocturia.    Musculoskeletal:  Denies back pain, neck pain, joint pain or leg swelling.    Integument:  Denies rash, itching.    Neurologic:  Denies  headache, focal weakness or sensory changes.    Endocrine:  Denies polyuria, polydipsia or temperature intolerance.    Lymphatic:  Denies swollen glands, weight loss.    Psychiatric:  Denies anxiety, depression, hallucinations, irritability or sleeping problems.      OBJECTIVE:      VITAL SIGNS:    Vital Last Value 24 Hour Range   Temperature 97.5 °F (36.4 °C) (07/31/23 2032) Temp  Min: 97.5 °F (36.4 °C)  Max: 99.3 °F (37.4 °C)   Pulse 68 (07/31/23 2032) Pulse  Min: 68  Max: 89   Respiratory 18 (07/31/23 2032) Resp  Min: 18  Max: 31   Non-Invasive  Blood Pressure 110/58 (07/31/23 2032) BP  Min: 110/58  Max: 144/64   Pulse Oximetry 93 % (07/31/23 2032) SpO2  Min: 87 %  Max: 98 %     Vital Today Admitted   Weight 118 kg (260 lb 2.3 oz) (07/31/23 1900) Weight: 119.6 kg (263 lb 10.7 oz) (07/31/23 1401)   Height N/A     Body Mass Index N/A       INTAKE/OUTPUT:      Intake/Output Summary (Last 24 hours) at 7/31/2023 2118  Last data filed at 7/31/2023 2032  Gross per 24 hour   Intake 918.07 ml   Output 300 ml   Net 618.07 ml        PHYSICAL EXAM:    Constitutional:  The patient is alert, oriented and cooperative.   Integument:  Warm. Dry. No erythema. No rash.    HENT:  Normocephalic. Atraumatic. Bilateral external ears normal.   Neck: Normal range of motion. No tenderness.   Cardiovascular:  Normal heart rate. Normal rhythm.  Respiratory: No respiratory distress. 4L NC.    Abdomen:  soft, ND/NT  Vascular:  Biphasic DP/PT bilaterally.    Extremity:  warm extremities;  no clubbing/cyanosis.  1+ edema bilateral lower extremities, VIANEY hoses in use. No wounds.   Neuro:  A/O x3, no focal neuro deficits.  Motor/sensory grossly intact and equal bilaterally.      LABORATORY DATA:    Lab Results   Component Value Date    SODIUM 135 07/31/2023    POTASSIUM 4.2 07/31/2023    CHLORIDE 102 07/31/2023    CO2 29 07/31/2023    BUN 12 07/31/2023    CREATININE 0.96 07/31/2023    GLUCOSE 91 07/31/2023    WBC 10.4 07/31/2023    HCT 32.6 (L)  07/31/2023    HGB 10.9 (L) 07/31/2023     07/31/2023    AST 12 07/31/2023    GPT 22 07/31/2023    ALKPT 79 07/31/2023    BILIRUBIN 1.4 (H) 07/31/2023      INR (no units)   Date Value   07/31/2023 1.0      No results found for: \"COL\", \"UAPP\", \"USPG\", \"UPH\", \"UPROT\", \"UGLU\", \"UKET\", \"UBILI\", \"URBC\", \"UNITR\", \"UROB\", \"UWBC\"     IMAGING STUDIES:    Venous duplex bilat LE personally reviewed.    \"1. No deep venous thrombosis in the bilateral lower extremity visualized veins. 2. Right popliteal artery aneurysm measuring 2.2 x 2.8 x 3.4 cm. Demonstrates moderate mural thrombus. 3. Distal left femoral artery/left popliteal artery aneurysm measures 2.3 x 2.4 x 5.1 cm. This demonstrates near complete opacification with thrombus with only small amount of residual color Doppler flow.\"    ASSESSMENT:     Mr. Hale is a 83 year old male with:  1.  Asymptomatic bilateral popliteal artery aneurysm, both >2cm;  No acute limb ischemia.   2.  Acute hypoxic respiratory insufficiency 2/2 #3 and #4  3.  COVID19 +  4.  RLL PNA    PLAN:    -Start hep gtt.  Anticoagulation due to near occlusion of the left popliteal artery aneurysm on duplex imaging.    -Obtain CTA aorta with run off tomorrow to better assess anatomy for surgical repair  -Will plan for bilateral KUMAR repair -- likely outpatient after patient has recovered for PNA and COVID19  -Remaining care per primary team    Thank you very much for allowing me to participate in the care of this patient.    Dr. Samuel Mirza, DO  Vascular/Endovascular Surgery  [facility]

## 2023-12-18 NOTE — PROGRESS NOTES
MYA Escobar   [facility]  [address] [facility] [place] [address]  [phone]      PATIENT NAME: Delma Rae  : 1945  DATE: 23  MRN: [id number]      Billing Provider: MYA Escobar  Level of Service:   Patient PCP Information       Provider PCP Type    Fco Chaparro MD General            Reason for Visit / Chief Complaint: Non-healing Wound Follow Up (RLE)       History of Present Illness / Problem Focused Workflow     Delma Rae is a 78 y.o. female who presents to clinic for follow up on chronic-non healing wound on the right lower leg. Wound is larger today than last visit. She has follow up with Rheumatology today. Reports significant improvement with last prednisone taper but reports as soon as dose was tapered down that wound flared. Lotrisone with Calmoseptine to wounds, continue two layer compression.     Arterial doppler 2022, No areas of occlusion. No doubling of peak systolic velocity between adjacent stations to specifically suggest high-grade stenosis of a focal nature. Ankle brachial indices measure normal at 1.27 right and 1.19 left.     Significant PMH of CHF, COPD, hypertension, MI, rheumatoid arthritis, hyperlipidemia, GERD, and diabetes. Last HgA1C 7 in , diabetes managed by PCP.  Pertinent factors that delay wound healing include multiple co-morbidities, poor vascular supply, diabetes, edema, heavy drainage, excessive wound moisture and macerated tissue, large surface area, advanced age and fragile skin. Denies fever and chills.        Review of Systems     Review of Systems   Constitutional:  Positive for activity change. Negative for chills and fever.   Respiratory:  Negative for chest tightness and shortness of breath.    Cardiovascular:  Positive for leg swelling. Negative for chest pain and palpitations.   Musculoskeletal:  Positive for arthralgias and joint swelling.   Skin:   Positive for wound.        See wound assessment   Neurological:  Positive for weakness and numbness.   Psychiatric/Behavioral:  Negative for agitation, behavioral problems, confusion and self-injury.        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 Chaidez    GERD (gastroesophageal reflux disease)     HHD (hypertensive heart disease)     Hyperlipidemia     Hypertension     MI (myocardial infarction)     OA (osteoarthritis)     Rheumatoid arthritis        Past Surgical History:   Procedure Laterality Date    HYSTERECTOMY      OOPHORECTOMY      VAGINAL DELIVERY      x 1       Social History  Ms. Delma Rae  reports that she has never smoked. She has never used smokeless tobacco. She reports that she does not drink alcohol and does not use drugs.    Family History  Ms.'aubree Rae family history includes Cancer in her brother and father; Coronary artery disease in her brother and sister; Diabetes in her father and mother; Hypertension in her mother and son; Prostate cancer in her father.    Medications and Allergies     Medications  Outpatient Medications Marked as Taking for the 12/19/23 encounter (Office Visit) with Shannan De Souza FNP   Medication Sig Dispense Refill    latanoprost 0.005 % ophthalmic solution Place 1 drop into both eyes once daily.      loratadine (CLARITIN) 10 mg tablet Take 1 tablet (10 mg total) by mouth once daily. 90 tablet 1    losartan (COZAAR) 100 MG tablet Take 1 tablet (100 mg total) by mouth once daily. 90 tablet 5    meloxicam (MOBIC) 7.5 MG tablet Take 1 tablet (7.5 mg total) by mouth once daily. 30 tablet 1    methotrexate 2.5 MG Tab Take 5  tablets (12.5 mg total) by mouth every 7 days. 5 tablets q7 days 60 tablet 1    metoprolol succinate (TOPROL-XL) 100 MG 24 hr tablet Take 1 tablet (100 mg total) by mouth once daily. 30 tablet 5    multivit-min-FA-lycopen-lutein (CENTRUM SILVER) 0.4 mg-300 mcg- 250 mcg Tab Take 1 tablet by mouth once daily.      multivitamin/iron/folic acid (CENTRUM ULTRA WOMEN'S ORAL) Take 1 tablet by mouth once daily.      naproxen (NAPROSYN) 500 MG tablet Take 1 tablet (500 mg total) by mouth 2 (two) times daily as needed. 20 tablet 1    omeprazole (PRILOSEC) 20 MG capsule Take 1 capsule (20 mg total) by mouth once daily. 90 capsule 1    pantoprazole (PROTONIX) 40 MG tablet Take 1 tablet (40 mg total) by mouth once daily. 90 tablet 1    phenoL (CHLORASEPTIC THROAT SPRAY) 1.4 % SprA by Mucous Membrane route 2 (two) times a day. 177 mL 0    potassium chloride SA (K-DUR,KLOR-CON M) 10 MEQ tablet Take 1 tablet (10 mEq total) by mouth 2 (two) times daily. 60 tablet 3    predniSONE (DELTASONE) 2.5 MG tablet Take 1 tablet (2.5 mg total) by mouth once daily. 60 tablet 1    pregabalin (LYRICA) 50 MG capsule Take 1 capsule by mouth 3 (three) times daily.      torsemide (DEMADEX) 20 MG Tab Take 2 tablets (40 mg total) by mouth every morning. 180 tablet 1    venlafaxine (EFFEXOR-XR) 75 MG 24 hr capsule Take 1 capsule (75 mg total) by mouth every other day. 45 capsule 1    zolpidem (AMBIEN) 5 MG Tab Take 1 tablet (5 mg total) by mouth nightly as needed. 1 tablet 0       Allergies  Review of patient's allergies indicates:  No Known Allergies    Physical Examination   There were no vitals filed for this visit.  Physical Exam  Vitals and nursing note reviewed.   Constitutional:       Appearance: Normal appearance.   HENT:      Head: Normocephalic.   Cardiovascular:      Rate and Rhythm: Normal rate and regular rhythm.      Pulses: Normal pulses.      Heart sounds: Normal heart sounds.   Pulmonary:      Effort: Pulmonary effort is normal. No  respiratory distress.   Chest:      Chest wall: No tenderness.   Musculoskeletal:         General: Swelling and tenderness present.      Right lower leg: Edema present.      Left lower leg: Edema present.   Skin:     General: Skin is warm and dry.      Capillary Refill: Capillary refill takes 2 to 3 seconds.      Findings: Erythema present.      Comments: See LDA for photo and measurements   Neurological:      General: No focal deficit present.      Mental Status: She is alert and oriented to person, place, and time. Mental status is at baseline.   Psychiatric:         Mood and Affect: Mood normal.         Behavior: Behavior normal.         Thought Content: Thought content normal.         Judgment: Judgment normal.       Assessment and Plan             Wound 09/27/21 Right lower;lateral Leg #3 (Active)   09/27/21     Pre-existing:    Primary Wound Type:    Side: Right   Orientation: lower;lateral   Location: Leg   Wound Number: #3   Ankle-Brachial Index:    Pulses:    Removal Indication and Assessment:    Wound Outcome:    (Retired) Wound Type:    (Retired) Wound Length (cm):    (Retired) Wound Width (cm):    (Retired) Depth (cm):    Wound Description (Comments):    Removal Indications:    Wound Image     12/19/23 1351   Dressing Appearance Dried drainage 12/19/23 1351   Drainage Amount Moderate 12/19/23 1351   Drainage Characteristics/Odor Maldonado;Serosanguineous 12/19/23 1351   Appearance Pink;Moist;Granulating 12/19/23 1351   Tissue loss description Full thickness 12/19/23 1351   Black (%), Wound Tissue Color 0 % 12/19/23 1351   Red (%), Wound Tissue Color 100 % 12/19/23 1351   Yellow (%), Wound Tissue Color 0 % 12/19/23 1351   Periwound Area Intact 12/19/23 1351   Wound Edges Irregular 12/19/23 1351   Wound Length (cm) 7 cm 12/19/23 1351   Wound Width (cm) 10 cm 12/19/23 1351   Wound Depth (cm) 0.3 cm 12/19/23 1351   Wound Volume (cm^3) 21 cm^3 12/19/23 1351   Wound Surface Area (cm^2) 70 cm^2 12/19/23 1351   Care  Cleansed with:;Antimicrobial agent;Soap and water 12/19/23 1351   Dressing Applied;Gauze 12/19/23 1351   Periwound Care Moisture barrier applied 12/19/23 1351   Compression Two layer compression 12/19/23 1351   Dressing Change Due 12/20/23 12/19/23 1351     Problem List Items Addressed This Visit          Orthopedic    Venous ulcer of right leg    Overview                                                    Current Assessment & Plan     Clean leg and foot with baby shampoo and water    Apply equal parts of gentamicin cream,calmoseptine cream and triamcinolone cream to open wounds    Apply two layer compression from base of toes to below knees    Change twice a week and as needed. Home health to assist    Elevate legs as much as possible    Avoid prolonged standing    Monitor closely for s/s of infection including fever, chills, increase in pain, odor from wound, and increased redness from foot. Go to ER if any complications develop.   Keep leg elevated and avoid pressure on wound.  Diabetes:  Monitor glucose closely. Check fasting glucose and 2 hours after meals. HgA1C goal <7, fasting glucose , and 2 hours after meals <180  Hypertension:  Check blood pressure twice daily, goal <120/80  Diet:   Increase protein intake, avoid fried, fatty foods and foods high in simple carbs.   Vitamins:  Take vitamin C 1000 mg, zinc 50mg, vitamin d 5000 units, and a daily multivitamin. Ernie is a good source of protein and nutrients to aid in wound healing.          Non-pressure chronic ulcer of other part of right lower leg with fat layer exposed - Primary    Overview                             Future Appointments   Date Time Provider Department Center   1/10/2024  1:00 PM Shannan De Souza FNP [facility]    1/10/2024  2:30 PM Lianet Bryson ACNP [facility] C[facility]   1/12/2024  3:00 PM AWV NURSE, [facility] FAMILY MEDICINE [facility] S[place]   2/1/2024  3:00 PM Fco Chaparro MD [facility]  Polina Simental            Signature:  MYA Escobar  [facility]  [address] [facility] [address]  [phone]    Date of encounter: 12/19/23

## 2023-12-19 ENCOUNTER — OFFICE VISIT (OUTPATIENT)
Dept: WOUND CARE | Facility: CLINIC | Age: 78
End: 2023-12-19
Attending: FAMILY MEDICINE
Payer: COMMERCIAL

## 2023-12-19 ENCOUNTER — OFFICE VISIT (OUTPATIENT)
Dept: RHEUMATOLOGY | Facility: CLINIC | Age: 78
End: 2023-12-19
Payer: COMMERCIAL

## 2023-12-19 VITALS
OXYGEN SATURATION: 98 % | WEIGHT: 205 LBS | SYSTOLIC BLOOD PRESSURE: 108 MMHG | HEART RATE: 63 BPM | DIASTOLIC BLOOD PRESSURE: 62 MMHG | RESPIRATION RATE: 16 BRPM | TEMPERATURE: 98 F | HEIGHT: 67 IN | BODY MASS INDEX: 32.18 KG/M2

## 2023-12-19 DIAGNOSIS — M05.20 RHEUMATOID VASCULITIS: Primary | ICD-10-CM

## 2023-12-19 DIAGNOSIS — L97.919 VENOUS ULCER OF RIGHT LEG: ICD-10-CM

## 2023-12-19 DIAGNOSIS — M06.9 RHEUMATOID ARTHRITIS, INVOLVING UNSPECIFIED SITE, UNSPECIFIED WHETHER RHEUMATOID FACTOR PRESENT: ICD-10-CM

## 2023-12-19 DIAGNOSIS — L97.812 NON-PRESSURE CHRONIC ULCER OF OTHER PART OF RIGHT LOWER LEG WITH FAT LAYER EXPOSED: Primary | ICD-10-CM

## 2023-12-19 DIAGNOSIS — I83.019 VENOUS ULCER OF RIGHT LEG: ICD-10-CM

## 2023-12-19 PROCEDURE — 3288F PR FALLS RISK ASSESSMENT DOCUMENTED: ICD-10-PCS | Mod: CPTII,,, | Performed by: INTERNAL MEDICINE

## 2023-12-19 PROCEDURE — 3074F PR MOST RECENT SYSTOLIC BLOOD PRESSURE < 130 MM HG: ICD-10-PCS | Mod: CPTII,,, | Performed by: INTERNAL MEDICINE

## 2023-12-19 PROCEDURE — 99213 PR OFFICE/OUTPT VISIT, EST, LEVL III, 20-29 MIN: ICD-10-PCS | Mod: S$PBB,,, | Performed by: NURSE PRACTITIONER

## 2023-12-19 PROCEDURE — 1126F PR PAIN SEVERITY QUANTIFIED, NO PAIN PRESENT: ICD-10-PCS | Mod: CPTII,,, | Performed by: INTERNAL MEDICINE

## 2023-12-19 PROCEDURE — 1159F PR MEDICATION LIST DOCUMENTED IN MEDICAL RECORD: ICD-10-PCS | Mod: CPTII,,, | Performed by: INTERNAL MEDICINE

## 2023-12-19 PROCEDURE — 1159F MED LIST DOCD IN RCRD: CPT | Mod: CPTII,,, | Performed by: INTERNAL MEDICINE

## 2023-12-19 PROCEDURE — 99213 OFFICE O/P EST LOW 20 MIN: CPT | Mod: S$PBB,,, | Performed by: NURSE PRACTITIONER

## 2023-12-19 PROCEDURE — 99215 OFFICE O/P EST HI 40 MIN: CPT | Mod: S$PBB,,, | Performed by: INTERNAL MEDICINE

## 2023-12-19 PROCEDURE — 3078F DIAST BP <80 MM HG: CPT | Mod: CPTII,,, | Performed by: INTERNAL MEDICINE

## 2023-12-19 PROCEDURE — 99215 OFFICE O/P EST HI 40 MIN: CPT | Mod: PBBFAC | Performed by: NURSE PRACTITIONER

## 2023-12-19 PROCEDURE — 1101F PT FALLS ASSESS-DOCD LE1/YR: CPT | Mod: CPTII,,, | Performed by: INTERNAL MEDICINE

## 2023-12-19 PROCEDURE — 1101F PR PT FALLS ASSESS DOC 0-1 FALLS W/OUT INJ PAST YR: ICD-10-PCS | Mod: CPTII,,, | Performed by: INTERNAL MEDICINE

## 2023-12-19 PROCEDURE — 3074F SYST BP LT 130 MM HG: CPT | Mod: CPTII,,, | Performed by: INTERNAL MEDICINE

## 2023-12-19 PROCEDURE — 99215 OFFICE O/P EST HI 40 MIN: CPT | Mod: PBBFAC | Performed by: INTERNAL MEDICINE

## 2023-12-19 PROCEDURE — 3078F PR MOST RECENT DIASTOLIC BLOOD PRESSURE < 80 MM HG: ICD-10-PCS | Mod: CPTII,,, | Performed by: INTERNAL MEDICINE

## 2023-12-19 PROCEDURE — 99215 PR OFFICE/OUTPT VISIT, EST, LEVL V, 40-54 MIN: ICD-10-PCS | Mod: S$PBB,,, | Performed by: INTERNAL MEDICINE

## 2023-12-19 PROCEDURE — 1126F AMNT PAIN NOTED NONE PRSNT: CPT | Mod: CPTII,,, | Performed by: INTERNAL MEDICINE

## 2023-12-19 PROCEDURE — 3288F FALL RISK ASSESSMENT DOCD: CPT | Mod: CPTII,,, | Performed by: INTERNAL MEDICINE

## 2023-12-19 RX ORDER — METHOTREXATE 2.5 MG/1
12.5 TABLET ORAL
Qty: 60 TABLET | Refills: 1 | Status: SHIPPED | OUTPATIENT
Start: 2023-12-19 | End: 2024-02-26 | Stop reason: SDUPTHER

## 2023-12-19 RX ORDER — TRIAMCINOLONE ACETONIDE 0.25 MG/G
CREAM TOPICAL 2 TIMES DAILY
Qty: 454 G | Refills: 2 | Status: SHIPPED | OUTPATIENT
Start: 2023-12-19

## 2023-12-19 RX ORDER — GENTAMICIN SULFATE 1 MG/G
OINTMENT TOPICAL DAILY
Qty: 90 G | Refills: 2 | Status: SHIPPED | OUTPATIENT
Start: 2023-12-19

## 2023-12-19 RX ORDER — SYRING-NEEDL,DISP,INSUL,0.3 ML 30 GX5/16"
SYRINGE, EMPTY DISPOSABLE MISCELLANEOUS
COMMUNITY
Start: 2023-11-18

## 2023-12-19 RX ORDER — HYDROXYCHLOROQUINE SULFATE 200 MG/1
200 TABLET, FILM COATED ORAL DAILY
Qty: 90 TABLET | Refills: 1 | Status: SHIPPED | OUTPATIENT
Start: 2023-12-19 | End: 2024-02-26 | Stop reason: SDUPTHER

## 2023-12-19 NOTE — PATIENT INSTRUCTIONS
Clean leg and foot with baby shampoo and water    Apply equal parts of gentamicin cream,calmoseptine cream and triamcinolone cream to open wounds    Apply two layer compression from base of toes to below knees    Change twice a week and as needed. Home health to assist    Elevate legs as much as possible    Avoid prolonged standing    Monitor closely for s/s of infection including fever, chills, increase in pain, odor from wound, and increased redness from foot. Go to ER if any complications develop.   Keep leg elevated and avoid pressure on wound.  Diabetes:  Monitor glucose closely. Check fasting glucose and 2 hours after meals. HgA1C goal <7, fasting glucose , and 2 hours after meals <180  Hypertension:  Check blood pressure twice daily, goal <120/80  Diet:   Increase protein intake, avoid fried, fatty foods and foods high in simple carbs.   Vitamins:  Take vitamin C 1000 mg, zinc 50mg, vitamin d 5000 units, and a daily multivitamin. Ernie is a good source of protein and nutrients to aid in wound healing.

## 2023-12-19 NOTE — PROGRESS NOTES
Anthoyn Sorto MD   [facility]  [address]  [facility]  [place] [address]  [phone]      PATIENT NAME: Delma Rae  : 1945  DATE: 23  MRN: [id number]      Billing Provider: Anthony Sorto MD  Level of Service:   Patient PCP Information       Provider PCP Type    Fco Chaparro MD General            Reason for Visit / Chief Complaint: Follow-up (Follow up CT scan. Pt scheduled x2 but no showed both appointments for CT scan)         Assessment and Plan (including Health Maintenance)      Problem List  Smart Sets  Document Outside HM   :    Plan:     77 yo in clinic with her son who also has RA [ patient on oxygen and wheelchair bound]   Dx with RA in 2016   Currently on mtx 6 tabs/week   Also takes plaquenil  Prednisone 2.5mg per day.   Oxygen dependency is from CHF [ non-smoker]   Patient is also concerned about the wounds on her right lower extremity. I did not visualize them today as she had a dressing on. But reportedly has edema persistently [CHF] and diabetic. I suspect the wounds are of non-rheumatic etiology based on probability of uncontrolled CHF and diabetes. Unlikely to be vasculitis. I can consider Rituxan infusion for her RA which may help with reducing prednisone and addressing any vasculitic compoonent from RA to the wounds.   Son shows me bottle of prednisone [taper rx] which is prescribed to her for when her wounds flare. She does response to it. So I will check for rheumatological serologies today with goal to move to rituxan for wound vasculitis from RA and as steroid sparing. TNF-inhibitors would be contraindicated in setting of CHF and active wounds.     Oct 25 2023;  RF ACPA +++ high titer   Wounds are healing slowly  CXR reviewed since  onwards and all confirming opacity in lung fields. No prior HRCT to confirm ILD   HRCT followed by consideration for Rituxan infusions.   Screening labs ordered  Renew MTX and  Plaquenil today.     12/19/2023;   Encouraged her to get HRCT   Is on oxygen today.   Is going to wound clinic.   Will order rituxan today.       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 Rheumatoid arthritis, involving unspecified site, unspecified whether rheumatoid factor present  M06.9 714.0 Ambulatory referral/consult to Infusion Dept             There are no diagnoses linked to this encounter.           Total time spent in Assessment, Co-ordination of Care , Review of Care Plan , Goal Setting and Counseling on this initial visit is 60 minutes     There is currently no information documented on the homunculus. Go to the Rheumatology activity and complete the homunculus joint exam.               History of Present Illness / Problem Focused Workflow     Delma Rae presents to the clinic with Follow-up (Follow up CT scan. Pt scheduled x2 but no showed both appointments for CT scan)     See under HPI      Review of Systems     Review of Systems   Constitutional:  Negative for fever.   HENT:  Negative for mouth sores and trouble swallowing.    Eyes:  Negative for redness.   Respiratory:  Negative for cough and shortness of breath.    Cardiovascular:  Negative for chest pain.   Gastrointestinal:  Negative for constipation and diarrhea.   Neurological:  Negative for headaches.   Hematological:  Does not bruise/bleed easily.     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 Chaidez    GERD (gastroesophageal reflux disease)     HHD (hypertensive heart disease)     Hyperlipidemia     Hypertension     MI  (myocardial infarction)     OA (osteoarthritis)     Rheumatoid arthritis        Past Surgical History:   Procedure Laterality Date    HYSTERECTOMY      OOPHORECTOMY      VAGINAL DELIVERY      x 1       Social History  Ms. Delma Rae  reports that she has never smoked. She has never used smokeless tobacco. She reports that she does not drink alcohol and does not use drugs.    Family History  Ms.'aubree Rae family history includes Cancer in her brother and father; Coronary artery disease in her brother and sister; Diabetes in her father and mother; Hypertension in her mother and son; Prostate cancer in her father.    Medications and Allergies     Medications  Outpatient Medications Marked as Taking for the 12/19/23 encounter (Office Visit) with Anthony Sorto MD   Medication Sig Dispense Refill    albuterol (PROVENTIL/VENTOLIN HFA) 90 mcg/actuation inhaler Inhale 2 puffs into the lungs every 4 (four) hours as needed for Wheezing or Shortness of Breath. 18 g 1    amitriptyline (ELAVIL) 10 MG tablet Take 1 tablet (10 mg total) by mouth every evening. 90 tablet 1    amLODIPine (NORVASC) 5 MG tablet Take 1 tablet (5 mg total) by mouth once daily. 90 tablet 1    amoxicillin (AMOXIL) 500 MG capsule Take 500 mg by mouth 2 (two) times daily.      amoxicillin-clavulanate 500-125mg (AUGMENTIN) 500-125 mg Tab Take 1 tablet (500 mg total) by mouth 2 (two) times daily. 14 tablet 0    aspirin (ECOTRIN) 81 MG EC tablet Take 81 mg by mouth once daily.      atorvastatin (LIPITOR) 40 MG tablet Take 1 tablet (40 mg total) by mouth once daily. 90 tablet 1    blood sugar diagnostic (ONETOUCH ULTRA BLUE TEST STRIP) Strp 1 strip by Other route 4 (four) times daily before meals and nightly. Dx e11.9 200 strip 5    brimonidine 0.2% (ALPHAGAN) 0.2 % Drop Place 1 drop into both eyes 2 (two) times daily.      dapagliflozin propanediol (FARXIGA) 10 mg tablet Take 1 tablet (10 mg total) by mouth once daily. 30  "tablet 11    ferrous sulfate 325 (65 FE) MG EC tablet Take 1 tablet (325 mg total) by mouth once daily. 90 tablet 1    fluticasone furoate-vilanteroL (BREO ELLIPTA) 100-25 mcg/dose diskus inhaler Inhale 1 puff into the lungs daily as needed.      fluticasone propionate (FLONASE) 50 mcg/actuation nasal spray 1 spray (50 mcg total) by Each Nostril route daily as needed for Rhinitis. 16 g 1    folic acid (FOLVITE) 1 MG tablet Take 1 tablet (1 mg total) by mouth once daily. 90 tablet 1    furosemide (LASIX) 40 MG tablet Take 1 tablet (40 mg total) by mouth 3 (three) times daily. 90 tablet 1    gentamicin (GARAMYCIN) 0.1 % ointment Apply topically Daily. 90 g 2    hydrALAZINE (APRESOLINE) 25 MG tablet Take 1 tablet (25 mg total) by mouth 3 (three) times daily. 270 tablet 1    hydroxychloroquine (PLAQUENIL) 200 mg tablet Take 1 tablet (200 mg total) by mouth once daily. 90 tablet 1    insulin asp prt-insulin aspart, NovoLOG 70/30, (NOVOLOG 70/30) 100 unit/mL (70-30) Soln Subcutaneous      insulin NPH-insulin regular, 70/30, (NOVOLIN 70/30 U-100 INSULIN) 100 unit/mL (70-30) injection INJECT 40 UNITS UNDER SKIN IN THE MORNING AND 15 IN THE EVENING. 50 mL 6    insulin syringe-needle U-100 1 mL 31 gauge x 5/16 Syrg Inject 1 each into the skin 2 (two) times a day. Dx e11.9 200 each 3    insulin syringe-needle U-100 1/2 mL 31 gauge x 15/64" Syrg by Misc.(Non-Drug; Combo Route) route. Use as directed with insulin      lancets Misc 1 each by skin prick route 4 (four) times daily before meals and nightly. 200 each 5    latanoprost 0.005 % ophthalmic solution Place 1 drop into both eyes once daily.      loratadine (CLARITIN) 10 mg tablet Take 1 tablet (10 mg total) by mouth once daily. 90 tablet 1    losartan (COZAAR) 100 MG tablet Take 1 tablet (100 mg total) by mouth once daily. 90 tablet 5    meloxicam (MOBIC) 7.5 MG tablet Take 1 tablet (7.5 mg total) by mouth once daily. 30 tablet 1    methotrexate 2.5 MG Tab " Take 5 tablets (12.5 mg total) by mouth every 7 days. 5 tablets q7 days 60 tablet 1    metoprolol succinate (TOPROL-XL) 100 MG 24 hr tablet Take 1 tablet (100 mg total) by mouth once daily. 30 tablet 5    multivit-min-FA-lycopen-lutein (CENTRUM SILVER) 0.4 mg-300 mcg- 250 mcg Tab Take 1 tablet by mouth once daily.      multivitamin/iron/folic acid (CENTRUM ULTRA WOMEN'S ORAL) Take 1 tablet by mouth once daily.      naproxen (NAPROSYN) 500 MG tablet Take 1 tablet (500 mg total) by mouth 2 (two) times daily as needed. 20 tablet 1    omeprazole (PRILOSEC) 20 MG capsule Take 1 capsule (20 mg total) by mouth once daily. 90 capsule 1    pantoprazole (PROTONIX) 40 MG tablet Take 1 tablet (40 mg total) by mouth once daily. 90 tablet 1    phenoL (CHLORASEPTIC THROAT SPRAY) 1.4 % SprA by Mucous Membrane route 2 (two) times a day. 177 mL 0    potassium chloride SA (K-DUR,KLOR-CON M) 10 MEQ tablet Take 1 tablet (10 mEq total) by mouth 2 (two) times daily. 60 tablet 3    predniSONE (DELTASONE) 2.5 MG tablet Take 1 tablet (2.5 mg total) by mouth once daily. 60 tablet 1    pregabalin (LYRICA) 50 MG capsule Take 1 capsule by mouth 3 (three) times daily.      torsemide (DEMADEX) 20 MG Tab Take 2 tablets (40 mg total) by mouth every morning. 180 tablet 1    triamcinolone acetonide 0.025% (KENALOG) 0.025 % cream Apply topically 2 (two) times daily. 454 g 2    TRUEPLUS INSULIN 1 mL 30 gauge x 5/16 Syrg       venlafaxine (EFFEXOR-XR) 75 MG 24 hr capsule Take 1 capsule (75 mg total) by mouth every other day. 45 capsule 1    zolpidem (AMBIEN) 5 MG Tab Take 1 tablet (5 mg total) by mouth nightly as needed. 1 tablet 0       Allergies  Review of patient's allergies indicates:  No Known Allergies    Physical Examination     Vitals:    12/19/23 1500   BP: 108/62   Pulse: 63   Resp: 16   Temp: 98 °F (36.7 °C)       Physical Exam  Constitutional:       Appearance: She is obese.   HENT:      Head: Normocephalic and atraumatic.       Right Ear: External ear normal.      Left Ear: External ear normal.      Nose: Nose normal. No congestion or rhinorrhea.   Eyes:      Extraocular Movements: Extraocular movements intact.      Pupils: Pupils are equal, round, and reactive to light.   Cardiovascular:      Pulses: Normal pulses.   Pulmonary:      Effort: Pulmonary effort is normal.      Breath sounds: Normal breath sounds. No wheezing.   Chest:      Chest wall: No tenderness.   Musculoskeletal:         General: Normal range of motion.      Cervical back: No rigidity or tenderness.   Lymphadenopathy:      Cervical: No cervical adenopathy.   Skin:     Findings: No rash.   Neurological:      General: No focal deficit present.      Mental Status: She is alert and oriented to person, place, and time.   Psychiatric:         Mood and Affect: Mood normal.         Thought Content: Thought content normal.              Signature:  Anthony Sorto MD  [facility]  [address] [place] [id number]  [phone]    Date of encounter: 12/19/23

## 2023-12-19 NOTE — ASSESSMENT & PLAN NOTE
Clean leg and foot with baby shampoo and water    Apply equal parts of gentamicin cream,calmoseptine cream and triamcinolone cream to open wounds    Apply two layer compression from base of toes to below knees    Change twice a week and as needed. Home health to assist    Elevate legs as much as possible    Avoid prolonged standing    Monitor closely for s/s of infection including fever, chills, increase in pain, odor from wound, and increased redness from foot. Go to ER if any complications develop.   Keep leg elevated and avoid pressure on wound.  Diabetes:  Monitor glucose closely. Check fasting glucose and 2 hours after meals. HgA1C goal <7, fasting glucose , and 2 hours after meals <180  Hypertension:  Check blood pressure twice daily, goal <120/80  Diet:   Increase protein intake, avoid fried, fatty foods and foods high in simple carbs.   Vitamins:  Take vitamin C 1000 mg, zinc 50mg, vitamin d 5000 units, and a daily multivitamin. Ernie is a good source of protein and nutrients to aid in wound healing.    oral

## 2023-12-21 RX ORDER — NEBULIZER AND COMPRESSOR
1 EACH MISCELLANEOUS DAILY
Qty: 1 EACH | Refills: 0 | Status: SHIPPED | OUTPATIENT
Start: 2023-12-21

## 2024-01-01 ENCOUNTER — HOSPITAL ENCOUNTER (EMERGENCY)
Facility: HOSPITAL | Age: 79
End: 2024-05-23
Attending: EMERGENCY MEDICINE
Payer: COMMERCIAL

## 2024-01-01 ENCOUNTER — OFFICE VISIT (OUTPATIENT)
Dept: FAMILY MEDICINE | Facility: CLINIC | Age: 79
End: 2024-01-01
Payer: COMMERCIAL

## 2024-01-01 ENCOUNTER — EXTERNAL CHRONIC CARE MANAGEMENT (OUTPATIENT)
Dept: FAMILY MEDICINE | Facility: CLINIC | Age: 79
End: 2024-01-01
Payer: COMMERCIAL

## 2024-01-01 ENCOUNTER — TELEPHONE (OUTPATIENT)
Dept: RHEUMATOLOGY | Facility: CLINIC | Age: 79
End: 2024-01-01
Payer: COMMERCIAL

## 2024-01-01 ENCOUNTER — TELEPHONE (OUTPATIENT)
Dept: INFUSION THERAPY | Facility: HOSPITAL | Age: 79
End: 2024-01-01
Payer: COMMERCIAL

## 2024-01-01 ENCOUNTER — OFFICE VISIT (OUTPATIENT)
Dept: WOUND CARE | Facility: CLINIC | Age: 79
End: 2024-01-01
Attending: FAMILY MEDICINE
Payer: COMMERCIAL

## 2024-01-01 ENCOUNTER — OFFICE VISIT (OUTPATIENT)
Dept: CARDIOLOGY | Facility: CLINIC | Age: 79
End: 2024-01-01
Payer: COMMERCIAL

## 2024-01-01 ENCOUNTER — TELEPHONE (OUTPATIENT)
Dept: WOUND CARE | Facility: CLINIC | Age: 79
End: 2024-01-01
Payer: COMMERCIAL

## 2024-01-01 ENCOUNTER — OFFICE VISIT (OUTPATIENT)
Dept: RHEUMATOLOGY | Facility: CLINIC | Age: 79
End: 2024-01-01
Payer: COMMERCIAL

## 2024-01-01 VITALS
BODY MASS INDEX: 32.42 KG/M2 | RESPIRATION RATE: 18 BRPM | OXYGEN SATURATION: 95 % | HEART RATE: 80 BPM | TEMPERATURE: 98 F | DIASTOLIC BLOOD PRESSURE: 60 MMHG | HEIGHT: 67 IN | SYSTOLIC BLOOD PRESSURE: 106 MMHG

## 2024-01-01 VITALS
OXYGEN SATURATION: 98 % | HEART RATE: 67 BPM | BODY MASS INDEX: 32.49 KG/M2 | DIASTOLIC BLOOD PRESSURE: 70 MMHG | HEIGHT: 67 IN | WEIGHT: 207 LBS | SYSTOLIC BLOOD PRESSURE: 128 MMHG | RESPIRATION RATE: 16 BRPM

## 2024-01-01 VITALS
HEIGHT: 67 IN | WEIGHT: 207 LBS | DIASTOLIC BLOOD PRESSURE: 64 MMHG | OXYGEN SATURATION: 97 % | HEART RATE: 71 BPM | BODY MASS INDEX: 32.49 KG/M2 | SYSTOLIC BLOOD PRESSURE: 104 MMHG

## 2024-01-01 VITALS
RESPIRATION RATE: 20 BRPM | DIASTOLIC BLOOD PRESSURE: 67 MMHG | SYSTOLIC BLOOD PRESSURE: 128 MMHG | TEMPERATURE: 97 F | HEART RATE: 71 BPM

## 2024-01-01 VITALS
SYSTOLIC BLOOD PRESSURE: 92 MMHG | DIASTOLIC BLOOD PRESSURE: 58 MMHG | TEMPERATURE: 98 F | HEART RATE: 71 BPM | RESPIRATION RATE: 20 BRPM

## 2024-01-01 VITALS — HEIGHT: 66 IN | WEIGHT: 148.13 LBS | BODY MASS INDEX: 23.81 KG/M2 | TEMPERATURE: 98 F

## 2024-01-01 DIAGNOSIS — M06.9 RHEUMATOID ARTHRITIS, INVOLVING UNSPECIFIED SITE, UNSPECIFIED WHETHER RHEUMATOID FACTOR PRESENT: Primary | ICD-10-CM

## 2024-01-01 DIAGNOSIS — I73.9 PVD (PERIPHERAL VASCULAR DISEASE): ICD-10-CM

## 2024-01-01 DIAGNOSIS — I42.0 DILATED CARDIOMYOPATHY: Primary | ICD-10-CM

## 2024-01-01 DIAGNOSIS — N18.4 CKD (CHRONIC KIDNEY DISEASE) STAGE 4, GFR 15-29 ML/MIN: ICD-10-CM

## 2024-01-01 DIAGNOSIS — I83.019 VENOUS ULCER OF RIGHT LEG: Primary | ICD-10-CM

## 2024-01-01 DIAGNOSIS — I42.9 CARDIOMYOPATHY, UNSPECIFIED TYPE: ICD-10-CM

## 2024-01-01 DIAGNOSIS — I46.9 CARDIOPULMONARY ARREST: Primary | ICD-10-CM

## 2024-01-01 DIAGNOSIS — L97.919 VENOUS ULCER OF RIGHT LEG: Primary | ICD-10-CM

## 2024-01-01 DIAGNOSIS — I10 ESSENTIAL HYPERTENSION: Chronic | ICD-10-CM

## 2024-01-01 DIAGNOSIS — M06.9 RHEUMATOID ARTHRITIS, INVOLVING UNSPECIFIED SITE, UNSPECIFIED WHETHER RHEUMATOID FACTOR PRESENT: ICD-10-CM

## 2024-01-01 DIAGNOSIS — M05.20 RHEUMATOID VASCULITIS: ICD-10-CM

## 2024-01-01 DIAGNOSIS — I50.9 CONGESTIVE HEART FAILURE, UNSPECIFIED HF CHRONICITY, UNSPECIFIED HEART FAILURE TYPE: ICD-10-CM

## 2024-01-01 DIAGNOSIS — G89.29 OTHER CHRONIC PAIN: Primary | ICD-10-CM

## 2024-01-01 DIAGNOSIS — Z79.899 HIGH RISK MEDICATION USE: Primary | ICD-10-CM

## 2024-01-01 PROCEDURE — 3078F DIAST BP <80 MM HG: CPT | Mod: CPTII,,, | Performed by: NURSE PRACTITIONER

## 2024-01-01 PROCEDURE — 1159F MED LIST DOCD IN RCRD: CPT | Mod: CPTII,,, | Performed by: NURSE PRACTITIONER

## 2024-01-01 PROCEDURE — 1126F AMNT PAIN NOTED NONE PRSNT: CPT | Mod: CPTII,,, | Performed by: NURSE PRACTITIONER

## 2024-01-01 PROCEDURE — 1101F PT FALLS ASSESS-DOCD LE1/YR: CPT | Mod: CPTII,,, | Performed by: NURSE PRACTITIONER

## 2024-01-01 PROCEDURE — 3288F FALL RISK ASSESSMENT DOCD: CPT | Mod: CPTII,,, | Performed by: NURSE PRACTITIONER

## 2024-01-01 PROCEDURE — 99214 OFFICE O/P EST MOD 30 MIN: CPT | Mod: 25,,, | Performed by: INTERNAL MEDICINE

## 2024-01-01 PROCEDURE — 99214 OFFICE O/P EST MOD 30 MIN: CPT | Mod: S$PBB,,, | Performed by: NURSE PRACTITIONER

## 2024-01-01 PROCEDURE — 99213 OFFICE O/P EST LOW 20 MIN: CPT | Mod: S$PBB,,, | Performed by: NURSE PRACTITIONER

## 2024-01-01 PROCEDURE — 3074F SYST BP LT 130 MM HG: CPT | Mod: CPTII,,, | Performed by: NURSE PRACTITIONER

## 2024-01-01 PROCEDURE — 3078F DIAST BP <80 MM HG: CPT | Mod: ,,, | Performed by: INTERNAL MEDICINE

## 2024-01-01 PROCEDURE — 99215 OFFICE O/P EST HI 40 MIN: CPT | Mod: PBBFAC | Performed by: NURSE PRACTITIONER

## 2024-01-01 PROCEDURE — G0511 CCM/BHI BY RHC/FQHC 20MIN MO: HCPCS | Mod: ,,, | Performed by: INTERNAL MEDICINE

## 2024-01-01 PROCEDURE — 3288F FALL RISK ASSESSMENT DOCD: CPT | Mod: ,,, | Performed by: INTERNAL MEDICINE

## 2024-01-01 PROCEDURE — 1101F PT FALLS ASSESS-DOCD LE1/YR: CPT | Mod: ,,, | Performed by: INTERNAL MEDICINE

## 2024-01-01 PROCEDURE — 3074F SYST BP LT 130 MM HG: CPT | Mod: ,,, | Performed by: INTERNAL MEDICINE

## 2024-01-01 PROCEDURE — 1160F RVW MEDS BY RX/DR IN RCRD: CPT | Mod: CPTII,,, | Performed by: NURSE PRACTITIONER

## 2024-01-01 PROCEDURE — 96372 THER/PROPH/DIAG INJ SC/IM: CPT | Mod: ,,, | Performed by: INTERNAL MEDICINE

## 2024-01-01 PROCEDURE — 99283 EMERGENCY DEPT VISIT LOW MDM: CPT | Performed by: EMERGENCY MEDICINE

## 2024-01-01 PROCEDURE — 99283 EMERGENCY DEPT VISIT LOW MDM: CPT

## 2024-01-01 PROCEDURE — 1125F AMNT PAIN NOTED PAIN PRSNT: CPT | Mod: ,,, | Performed by: INTERNAL MEDICINE

## 2024-01-01 RX ORDER — DIPHENHYDRAMINE HYDROCHLORIDE 50 MG/ML
50 INJECTION INTRAMUSCULAR; INTRAVENOUS ONCE AS NEEDED
Status: CANCELLED | OUTPATIENT
Start: 2024-01-01

## 2024-01-01 RX ORDER — MEPERIDINE HYDROCHLORIDE 50 MG/ML
25 INJECTION INTRAMUSCULAR; INTRAVENOUS; SUBCUTANEOUS
Status: CANCELLED | OUTPATIENT
Start: 2024-01-01 | End: 2024-01-01

## 2024-01-01 RX ORDER — FUROSEMIDE 40 MG/1
40 TABLET ORAL 3 TIMES DAILY
Qty: 90 TABLET | Refills: 1 | Status: SHIPPED | OUTPATIENT
Start: 2024-01-01

## 2024-01-01 RX ORDER — METHOTREXATE 2.5 MG/1
12.5 TABLET ORAL
Qty: 60 TABLET | Refills: 1 | Status: SHIPPED | OUTPATIENT
Start: 2024-01-01 | End: 2024-01-01 | Stop reason: SDUPTHER

## 2024-01-01 RX ORDER — SODIUM CHLORIDE 0.9 % (FLUSH) 0.9 %
10 SYRINGE (ML) INJECTION
Status: CANCELLED | OUTPATIENT
Start: 2024-01-01

## 2024-01-01 RX ORDER — ACETAMINOPHEN 325 MG/1
650 TABLET ORAL
Status: CANCELLED | OUTPATIENT
Start: 2024-01-01

## 2024-01-01 RX ORDER — FOLIC ACID 1 MG/1
1 TABLET ORAL DAILY
Qty: 90 TABLET | Refills: 1 | Status: SHIPPED | OUTPATIENT
Start: 2024-01-01 | End: 2024-01-01 | Stop reason: SDUPTHER

## 2024-01-01 RX ORDER — METHYLPREDNISOLONE SOD SUCC 125 MG
100 VIAL (EA) INJECTION
Status: CANCELLED | OUTPATIENT
Start: 2024-01-01

## 2024-01-01 RX ORDER — FAMOTIDINE 10 MG/ML
20 INJECTION INTRAVENOUS
Status: CANCELLED | OUTPATIENT
Start: 2024-01-01

## 2024-01-01 RX ORDER — LOSARTAN POTASSIUM 100 MG/1
100 TABLET ORAL DAILY
Qty: 90 TABLET | Refills: 5 | Status: SHIPPED | OUTPATIENT
Start: 2024-01-01

## 2024-01-01 RX ORDER — EPINEPHRINE 0.3 MG/.3ML
0.3 INJECTION SUBCUTANEOUS ONCE AS NEEDED
Status: CANCELLED | OUTPATIENT
Start: 2024-01-01

## 2024-01-01 RX ORDER — HYDROXYCHLOROQUINE SULFATE 200 MG/1
200 TABLET, FILM COATED ORAL DAILY
Qty: 90 TABLET | Refills: 1 | Status: SHIPPED | OUTPATIENT
Start: 2024-01-01 | End: 2024-01-01 | Stop reason: SDUPTHER

## 2024-01-01 RX ORDER — POTASSIUM CHLORIDE 750 MG/1
10 TABLET, EXTENDED RELEASE ORAL 2 TIMES DAILY
Qty: 180 TABLET | Refills: 1 | Status: SHIPPED | OUTPATIENT
Start: 2024-01-01 | End: 2024-10-01

## 2024-01-01 RX ORDER — PANTOPRAZOLE SODIUM 40 MG/1
40 TABLET, DELAYED RELEASE ORAL DAILY
Qty: 90 TABLET | Refills: 1 | Status: SHIPPED | OUTPATIENT
Start: 2024-01-01

## 2024-01-01 RX ORDER — ATORVASTATIN CALCIUM 40 MG/1
40 TABLET, FILM COATED ORAL DAILY
Qty: 90 TABLET | Refills: 1 | Status: SHIPPED | OUTPATIENT
Start: 2024-01-01

## 2024-01-01 RX ORDER — METHOTREXATE 2.5 MG/1
12.5 TABLET ORAL
Qty: 60 TABLET | Refills: 1 | Status: SHIPPED | OUTPATIENT
Start: 2024-01-01

## 2024-01-01 RX ORDER — HEPARIN 100 UNIT/ML
500 SYRINGE INTRAVENOUS
Status: CANCELLED | OUTPATIENT
Start: 2024-01-01

## 2024-01-01 RX ORDER — HYDROXYCHLOROQUINE SULFATE 200 MG/1
200 TABLET, FILM COATED ORAL DAILY
Qty: 90 TABLET | Refills: 1 | Status: SHIPPED | OUTPATIENT
Start: 2024-01-01

## 2024-01-01 RX ORDER — KETOROLAC TROMETHAMINE 30 MG/ML
15 INJECTION, SOLUTION INTRAMUSCULAR; INTRAVENOUS
Status: COMPLETED | OUTPATIENT
Start: 2024-01-01 | End: 2024-01-01

## 2024-01-01 RX ORDER — AMITRIPTYLINE HYDROCHLORIDE 10 MG/1
10 TABLET, FILM COATED ORAL NIGHTLY
Qty: 90 TABLET | Refills: 1 | Status: CANCELLED | OUTPATIENT
Start: 2024-01-01

## 2024-01-01 RX ORDER — FOLIC ACID 1 MG/1
1 TABLET ORAL DAILY
Qty: 90 TABLET | Refills: 1 | Status: SHIPPED | OUTPATIENT
Start: 2024-01-01

## 2024-01-01 RX ORDER — TORSEMIDE 20 MG/1
40 TABLET ORAL EVERY MORNING
Qty: 180 TABLET | Refills: 1 | Status: SHIPPED | OUTPATIENT
Start: 2024-01-01

## 2024-01-01 RX ORDER — HYDRALAZINE HYDROCHLORIDE 25 MG/1
25 TABLET, FILM COATED ORAL 3 TIMES DAILY
Qty: 270 TABLET | Refills: 1 | Status: SHIPPED | OUTPATIENT
Start: 2024-01-01

## 2024-01-01 RX ORDER — PREDNISONE 2.5 MG/1
2.5 TABLET ORAL DAILY
Qty: 60 TABLET | Refills: 1 | Status: SHIPPED | OUTPATIENT
Start: 2024-01-01

## 2024-01-01 RX ORDER — DAPAGLIFLOZIN 10 MG/1
10 TABLET, FILM COATED ORAL DAILY
Qty: 30 TABLET | Refills: 11 | Status: SHIPPED | OUTPATIENT
Start: 2024-01-01

## 2024-01-01 RX ORDER — OMEPRAZOLE 20 MG/1
20 CAPSULE, DELAYED RELEASE ORAL DAILY
Qty: 90 CAPSULE | Refills: 1 | Status: SHIPPED | OUTPATIENT
Start: 2024-01-01

## 2024-01-01 RX ORDER — ATORVASTATIN CALCIUM 40 MG/1
40 TABLET, FILM COATED ORAL
Qty: 90 TABLET | Refills: 1 | Status: SHIPPED | OUTPATIENT
Start: 2024-01-01 | End: 2024-01-01 | Stop reason: SDUPTHER

## 2024-01-01 RX ORDER — VENLAFAXINE HYDROCHLORIDE 75 MG/1
75 CAPSULE, EXTENDED RELEASE ORAL EVERY OTHER DAY
Qty: 45 CAPSULE | Refills: 1 | Status: SHIPPED | OUTPATIENT
Start: 2024-01-01

## 2024-01-01 RX ORDER — METOPROLOL SUCCINATE 100 MG/1
100 TABLET, EXTENDED RELEASE ORAL DAILY
Qty: 90 TABLET | Refills: 1 | Status: SHIPPED | OUTPATIENT
Start: 2024-01-01 | End: 2024-10-01

## 2024-01-01 RX ADMIN — KETOROLAC TROMETHAMINE 15 MG: 30 INJECTION, SOLUTION INTRAMUSCULAR; INTRAVENOUS at 04:04

## 2024-01-02 NOTE — TELEPHONE ENCOUNTER
Talked to the son about getting the IV Ruxience today.  Explained infusion process to son and had a date in mind.  When the son asked the patient about the infusion, she declined them saying that she was feeling better.  I then told them both that if she wants to get the infusion in the future to let the dr office know.  They stated understanding.

## 2024-01-10 NOTE — PROGRESS NOTES
"PCP: Fco Chaparro MD    Referring Provider:     Subjective:   Delma Rae is a 78 y.o. female with hx of CAD/CABG 2004, combined systolic and diastolic heart failure, pulmonary hypertension, RA, DM, type 2, bilateral lower extremity venous insufficiency, polymyalgia rheumatica,  who presents for routine follow up. She denies complaints. She sleeps on one pillow.     11/2/2023 presents to d/w the results of the echocardiogram and the /r/b/a of ICD implant.     8/1/2023 presents for 4 week follow up. She has tolerated the Farxiga and metoprolol without issues. She denies SOB and sleeps flat on no pillows.    6/21/2023 presents for routine follow-up.  Patient states she is doing well and denies complaints.    02/26/2023 presents for routine follow up. Patient's only complaint is lower ext edema that resolves at HS.She denies chest pain, pressure, heaviness, tightness, orthopnea (sleeps on one pillow), pnd, palpitations, syncope or near syncope. She states that she "walks a little just in her house" but has no issues when she is active. Patient's son reports that Dr. Chaparro put her back on the torsemide and the lasix. I will defer to the PCP.        5/10/2021 Patient's son reports that she was taking both lasix and torsemide until Sunday and ran out of lasix on Sunday. She has had no fluid retention since. We will stop the lasix and continue the torsemide. IF she begins having fluid retention her son is to increase the torsemide to bid dosing and call our office for further instructions.        Fhx:  Family History   Problem Relation Age of Onset    Diabetes Mother         age 60    Hypertension Mother     Diabetes Father     Prostate cancer Father     Cancer Father         Prostate, age 85    Coronary artery disease Sister         age 72    Cancer Brother     Coronary artery disease Brother     Hypertension Son         age 50     Shx:   Social History     Socioeconomic History    Marital status:  "   Tobacco Use    Smoking status: Never    Smokeless tobacco: Never   Substance and Sexual Activity    Alcohol use: Never    Drug use: Never    Sexual activity: Not Currently     Partners: Male       EKG 11/2/2023 RSR with HR 73 bpm; LVH with QRS widening; lateral infarct and inferior infarct  2/28/2023 regular sinus rhythm with PACs, heart rate 78 beats per minute, left axis deviation, LVH with QRS widening and repolarization abnormality, lateral infarct, inferior infarct 4/26/2022 RSR with HR 80 bpm; LVH with repolarization abnormality;     Results for orders placed during the hospital encounter of 09/11/23    Echo    Interpretation Summary    Left Ventricle: The left ventricle is mildly dilated. Normal wall thickness. There is moderately reduced systolic function with a visually estimated ejection fraction of 30 - 35%.    Left Atrium: Left atrium is mildly dilated.    Right Ventricle: Normal right ventricular cavity size.    Aortic Valve: The aortic valve is a trileaflet valve. There is moderate aortic valve sclerosis. There is mild aortic regurgitation.    Mitral Valve: There is mild regurgitation.    Tricuspid Valve: There is mild regurgitation.     ECHO Results for orders placed during the hospital encounter of 08/03/21    Echo    Interpretation Summary  · The left ventricle is normal in size with moderately decreased systolic function.  · The estimated ejection fraction is 30%.  · Grade I left ventricular diastolic dysfunction.  · Mild right ventricular enlargement with normal right ventricular systolic function.  · Normal central venous pressure (3 mmHg).  · Mild mitral regurgitation.     Heart cath 7/17/2020  Dr. Perez  LAD: Prox LAD 90% stenosis with ABISAI III flow  LCX: mid Lcx- 40% with ABISAI III flow  RCA: distal RCA 35% stenosis  LVEF 35%  Recommendations: optimize medical management     7/2004 Dr. Leena TRIVEDI top the LAD and SVG to the OM1         Lab Results   Component Value Date      07/24/2023    K 3.8 07/24/2023     07/24/2023    CO2 37 (H) 07/24/2023    BUN 23 (H) 07/24/2023    CREATININE 1.38 (H) 07/24/2023    CALCIUM 9.4 07/24/2023    ANIONGAP 7 07/24/2023    ESTGFRAFRICA 58 (L) 09/14/2021    EGFRNONAA 59 07/18/2020       Lab Results   Component Value Date    CHOL 165 01/09/2023     Lab Results   Component Value Date    HDL 66 (H) 01/09/2023     Lab Results   Component Value Date    LDLCALC 81 01/09/2023     Lab Results   Component Value Date    TRIG 92 01/09/2023     Lab Results   Component Value Date    CHOLHDL 2.5 01/09/2023       Lab Results   Component Value Date    WBC 11.00 09/21/2022    HGB 10.7 (L) 09/21/2022    HCT 33.8 (L) 09/21/2022    MCV 76.5 (L) 09/21/2022     09/21/2022           Current Outpatient Medications:     albuterol (PROVENTIL/VENTOLIN HFA) 90 mcg/actuation inhaler, Inhale 2 puffs into the lungs every 4 (four) hours as needed for Wheezing or Shortness of Breath., Disp: 18 g, Rfl: 1    aspirin (ECOTRIN) 81 MG EC tablet, Take 81 mg by mouth once daily., Disp: , Rfl:     atorvastatin (LIPITOR) 40 MG tablet, Take 1 tablet (40 mg total) by mouth once daily., Disp: 90 tablet, Rfl: 1    BLOOD PRESSURE CUFF Misc, 1 each by Misc.(Non-Drug; Combo Route) route once daily., Disp: 1 each, Rfl: 0    blood sugar diagnostic (ONETOUCH ULTRA BLUE TEST STRIP) Strp, 1 strip by Other route 4 (four) times daily before meals and nightly. Dx e11.9, Disp: 200 strip, Rfl: 5    brimonidine 0.2% (ALPHAGAN) 0.2 % Drop, Place 1 drop into both eyes 2 (two) times daily., Disp: , Rfl:     dapagliflozin propanediol (FARXIGA) 10 mg tablet, Take 1 tablet (10 mg total) by mouth once daily., Disp: 30 tablet, Rfl: 11    fluticasone furoate-vilanteroL (BREO ELLIPTA) 100-25 mcg/dose diskus inhaler, Inhale 1 puff into the lungs daily as needed., Disp: , Rfl:     folic acid (FOLVITE) 1 MG tablet, Take 1 tablet (1 mg total) by mouth once daily., Disp: 90 tablet, Rfl: 1    furosemide (LASIX) 40 MG  "tablet, Take 1 tablet (40 mg total) by mouth 3 (three) times daily., Disp: 90 tablet, Rfl: 1    gentamicin (GARAMYCIN) 0.1 % ointment, Apply topically Daily., Disp: 90 g, Rfl: 2    hydrALAZINE (APRESOLINE) 25 MG tablet, Take 1 tablet (25 mg total) by mouth 3 (three) times daily., Disp: 270 tablet, Rfl: 1    hydroxychloroquine (PLAQUENIL) 200 mg tablet, Take 1 tablet (200 mg total) by mouth once daily., Disp: 90 tablet, Rfl: 1    insulin NPH-insulin regular, 70/30, (NOVOLIN 70/30 U-100 INSULIN) 100 unit/mL (70-30) injection, INJECT 40 UNITS UNDER SKIN IN THE MORNING AND 15 IN THE EVENING., Disp: 50 mL, Rfl: 6    insulin syringe-needle U-100 1 mL 31 gauge x 5/16 Syrg, Inject 1 each into the skin 2 (two) times a day. Dx e11.9, Disp: 200 each, Rfl: 3    insulin syringe-needle U-100 1/2 mL 31 gauge x 15/64" Syrg, by Misc.(Non-Drug; Combo Route) route. Use as directed with insulin, Disp: , Rfl:     lancets Misc, 1 each by skin prick route 4 (four) times daily before meals and nightly., Disp: 200 each, Rfl: 5    latanoprost 0.005 % ophthalmic solution, Place 1 drop into both eyes once daily., Disp: , Rfl:     loratadine (CLARITIN) 10 mg tablet, Take 1 tablet (10 mg total) by mouth once daily., Disp: 90 tablet, Rfl: 1    losartan (COZAAR) 100 MG tablet, Take 1 tablet (100 mg total) by mouth once daily., Disp: 90 tablet, Rfl: 5    methotrexate 2.5 MG Tab, Take 5 tablets (12.5 mg total) by mouth every 7 days. 5 tablets q7 days, Disp: 60 tablet, Rfl: 1    metoprolol succinate (TOPROL-XL) 100 MG 24 hr tablet, Take 1 tablet (100 mg total) by mouth once daily., Disp: 30 tablet, Rfl: 5    naproxen (NAPROSYN) 500 MG tablet, Take 1 tablet (500 mg total) by mouth 2 (two) times daily as needed., Disp: 20 tablet, Rfl: 1    omeprazole (PRILOSEC) 20 MG capsule, Take 1 capsule (20 mg total) by mouth once daily., Disp: 90 capsule, Rfl: 1    pantoprazole (PROTONIX) 40 MG tablet, Take 1 tablet (40 mg total) by mouth once daily., Disp: 90 " tablet, Rfl: 1    potassium chloride SA (K-DUR,KLOR-CON M) 10 MEQ tablet, Take 1 tablet (10 mEq total) by mouth 2 (two) times daily., Disp: 60 tablet, Rfl: 3    predniSONE (DELTASONE) 2.5 MG tablet, Take 1 tablet (2.5 mg total) by mouth once daily., Disp: 60 tablet, Rfl: 1    torsemide (DEMADEX) 20 MG Tab, Take 2 tablets (40 mg total) by mouth every morning., Disp: 180 tablet, Rfl: 1    triamcinolone acetonide 0.025% (KENALOG) 0.025 % cream, Apply topically 2 (two) times daily., Disp: 454 g, Rfl: 2    TRUEPLUS INSULIN 1 mL 30 gauge x 5/16 Syrg, , Disp: , Rfl:     venlafaxine (EFFEXOR-XR) 75 MG 24 hr capsule, Take 1 capsule (75 mg total) by mouth every other day., Disp: 45 capsule, Rfl: 1    zolpidem (AMBIEN) 5 MG Tab, Take 1 tablet (5 mg total) by mouth nightly as needed., Disp: 1 tablet, Rfl: 0    amitriptyline (ELAVIL) 10 MG tablet, Take 1 tablet (10 mg total) by mouth every evening. (Patient not taking: Reported on 1/10/2024), Disp: 90 tablet, Rfl: 1    amLODIPine (NORVASC) 5 MG tablet, Take 1 tablet (5 mg total) by mouth once daily. (Patient not taking: Reported on 1/10/2024), Disp: 90 tablet, Rfl: 1    amoxicillin (AMOXIL) 500 MG capsule, Take 500 mg by mouth 2 (two) times daily., Disp: , Rfl:     amoxicillin-clavulanate 500-125mg (AUGMENTIN) 500-125 mg Tab, Take 1 tablet (500 mg total) by mouth 2 (two) times daily. (Patient not taking: Reported on 1/10/2024), Disp: 14 tablet, Rfl: 0    ferrous sulfate 325 (65 FE) MG EC tablet, Take 1 tablet (325 mg total) by mouth once daily. (Patient not taking: Reported on 1/10/2024), Disp: 90 tablet, Rfl: 1    fluticasone propionate (FLONASE) 50 mcg/actuation nasal spray, 1 spray (50 mcg total) by Each Nostril route daily as needed for Rhinitis. (Patient not taking: Reported on 1/10/2024), Disp: 16 g, Rfl: 1    insulin asp prt-insulin aspart, NovoLOG 70/30, (NOVOLOG 70/30) 100 unit/mL (70-30) Soln, Subcutaneous, Disp: , Rfl:     meloxicam (MOBIC) 7.5 MG tablet, Take 1  "tablet (7.5 mg total) by mouth once daily. (Patient not taking: Reported on 1/10/2024), Disp: 30 tablet, Rfl: 1    multivit-min-FA-lycopen-lutein (CENTRUM SILVER) 0.4 mg-300 mcg- 250 mcg Tab, Take 1 tablet by mouth once daily., Disp: , Rfl:     multivitamin/iron/folic acid (CENTRUM ULTRA WOMEN'S ORAL), Take 1 tablet by mouth once daily., Disp: , Rfl:     phenoL (CHLORASEPTIC THROAT SPRAY) 1.4 % SprA, by Mucous Membrane route 2 (two) times a day. (Patient not taking: Reported on 1/10/2024), Disp: 177 mL, Rfl: 0    pregabalin (LYRICA) 50 MG capsule, Take 1 capsule by mouth 3 (three) times daily., Disp: , Rfl:   Meds reviewed and reconciled.      Review of Systems   Respiratory:  Negative for shortness of breath.    Cardiovascular:  Negative for chest pain, palpitations, orthopnea, claudication, leg swelling and PND.   Neurological:  Negative for dizziness, loss of consciousness and weakness.           Objective:   /64 (BP Location: Left arm, Patient Position: Sitting)   Pulse 71   Ht 5' 7" (1.702 m)   Wt 93.9 kg (207 lb)   SpO2 97%   BMI 32.42 kg/m²     Physical Exam  Vitals and nursing note reviewed.   Constitutional:       Appearance: Normal appearance. She is normal weight.      Comments: In a wheelchair   HENT:      Head: Normocephalic and atraumatic.   Neck:      Vascular: No carotid bruit.   Cardiovascular:      Rate and Rhythm: Normal rate and regular rhythm.      Pulses: Normal pulses.      Heart sounds: Normal heart sounds.   Pulmonary:      Effort: Pulmonary effort is normal.      Breath sounds: Normal breath sounds.   Abdominal:      Palpations: Abdomen is soft.   Musculoskeletal:      Cervical back: Neck supple.      Right lower leg: No edema.      Left lower leg: No edema.   Skin:     General: Skin is warm and dry.      Capillary Refill: Capillary refill takes less than 2 seconds.   Neurological:      General: No focal deficit present.      Mental Status: She is alert.           Assessment: "     1. Dilated cardiomyopathy        2. Essential hypertension              Plan:   Cardiomyopathy  LVEF 30-35% 9/11/2023 echo  Patient still reflecting regarding ICD implant.    Coronary artery disease  Asymptomatic   Continue medical managment    Essential hypertension  104/64 mmHg          RTC: 6 months

## 2024-01-22 NOTE — PROGRESS NOTES
MYA Escobar   [facility]  [address] [facility] [place] [address]  [phone]      PATIENT NAME: Delma Rae  : 1945  DATE: 24  MRN: [id number]      Billing Provider: MYA Escobar  Level of Service:   Patient PCP Information       Provider PCP Type    Fco Chaparro MD General            Reason for Visit / Chief Complaint: Non-healing Wound Follow Up (RLE)       History of Present Illness / Problem Focused Workflow     Delma Rae is a 78 y.o. female who presents to clinic for follow up on chronic-non healing wound on the right lower leg. Wound with minimal improvement.     She has infusion of rituxan ordered per Rheumatology but she opted to wait on infusion. Discussed how infusion is likely to improve wound since she has such a positive response to prednisone.     Arterial doppler 2022, No areas of occlusion. No doubling of peak systolic velocity between adjacent stations to specifically suggest high-grade stenosis of a focal nature. Ankle brachial indices measure normal at 1.27 right and 1.19 left.     Significant PMH of CHF, COPD, hypertension, MI, rheumatoid arthritis, hyperlipidemia, GERD, and diabetes. Last HgA1C 7 in , diabetes managed by PCP.  Pertinent factors that delay wound healing include multiple co-morbidities, poor vascular supply, diabetes, edema, heavy drainage, excessive wound moisture and macerated tissue, large surface area, advanced age and fragile skin. Denies fever and chills.      Review of Systems     Review of Systems   Constitutional:  Positive for activity change. Negative for chills and fever.   Respiratory:  Negative for chest tightness and shortness of breath.    Cardiovascular:  Positive for leg swelling. Negative for chest pain and palpitations.   Musculoskeletal:  Positive for arthralgias and joint swelling.   Skin:  Positive for wound.        See wound assessment    Neurological:  Positive for weakness and numbness.   Psychiatric/Behavioral:  Negative for agitation, behavioral problems, confusion and self-injury.        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 Chaidez    GERD (gastroesophageal reflux disease)     HHD (hypertensive heart disease)     Hyperlipidemia     Hypertension     MI (myocardial infarction)     OA (osteoarthritis)     Rheumatoid arthritis        Past Surgical History:   Procedure Laterality Date    HYSTERECTOMY      OOPHORECTOMY      VAGINAL DELIVERY      x 1       Social History  Ms. Delma Rae  reports that she has never smoked. She has never used smokeless tobacco. She reports that she does not drink alcohol and does not use drugs.    Family History  Ms.'s Delma Rae family history includes Cancer in her brother and father; Coronary artery disease in her brother and sister; Diabetes in her father and mother; Hypertension in her mother and son; Prostate cancer in her father.    Medications and Allergies     Medications  Outpatient Medications Marked as Taking for the 1/29/24 encounter (Office Visit) with Shannan De Souza FNP   Medication Sig Dispense Refill    aspirin (ECOTRIN) 81 MG EC tablet Take 81 mg by mouth once daily.      atorvastatin (LIPITOR) 40 MG tablet TAKE 1 TABLET BY MOUTH EVERY DAY 90 tablet 1    BLOOD PRESSURE CUFF Misc 1 each by Misc.(Non-Drug; Combo Route) route once daily. 1 each 0    blood sugar diagnostic (ONETOUCH ULTRA BLUE TEST STRIP) Strp 1 strip by Other route 4 (four) times daily before meals and nightly. Dx e11.9 200 strip 5    brimonidine 0.2% (ALPHAGAN) 0.2 %  "Drop Place 1 drop into both eyes 2 (two) times daily.      dapagliflozin propanediol (FARXIGA) 10 mg tablet Take 1 tablet (10 mg total) by mouth once daily. 30 tablet 11    fluticasone furoate-vilanteroL (BREO ELLIPTA) 100-25 mcg/dose diskus inhaler Inhale 1 puff into the lungs daily as needed.      folic acid (FOLVITE) 1 MG tablet Take 1 tablet (1 mg total) by mouth once daily. 90 tablet 1    furosemide (LASIX) 40 MG tablet Take 1 tablet (40 mg total) by mouth 3 (three) times daily. 90 tablet 1    gentamicin (GARAMYCIN) 0.1 % ointment Apply topically Daily. 90 g 2    hydrALAZINE (APRESOLINE) 25 MG tablet Take 1 tablet (25 mg total) by mouth 3 (three) times daily. 270 tablet 1    hydroxychloroquine (PLAQUENIL) 200 mg tablet Take 1 tablet (200 mg total) by mouth once daily. 90 tablet 1    insulin asp prt-insulin aspart, NovoLOG 70/30, (NOVOLOG 70/30) 100 unit/mL (70-30) Soln Subcutaneous      insulin NPH-insulin regular, 70/30, (NOVOLIN 70/30 U-100 INSULIN) 100 unit/mL (70-30) injection INJECT 40 UNITS UNDER SKIN IN THE MORNING AND 15 IN THE EVENING. 50 mL 6    insulin syringe-needle U-100 1 mL 31 gauge x 5/16 Syrg Inject 1 each into the skin 2 (two) times a day. Dx e11.9 200 each 3    insulin syringe-needle U-100 1/2 mL 31 gauge x 15/64" Syrg by Misc.(Non-Drug; Combo Route) route. Use as directed with insulin      lancets Misc 1 each by skin prick route 4 (four) times daily before meals and nightly. 200 each 5    latanoprost 0.005 % ophthalmic solution Place 1 drop into both eyes once daily.      loratadine (CLARITIN) 10 mg tablet Take 1 tablet (10 mg total) by mouth once daily. 90 tablet 1    losartan (COZAAR) 100 MG tablet Take 1 tablet (100 mg total) by mouth once daily. 90 tablet 5    methotrexate 2.5 MG Tab Take 5 tablets (12.5 mg total) by mouth every 7 days. 5 tablets q7 days 60 tablet 1    multivit-min-FA-lycopen-lutein (CENTRUM SILVER) 0.4 mg-300 mcg- 250 mcg Tab Take 1 tablet by mouth once " daily.      multivitamin/iron/folic acid (CENTRUM ULTRA WOMEN'S ORAL) Take 1 tablet by mouth once daily.      naproxen (NAPROSYN) 500 MG tablet Take 1 tablet (500 mg total) by mouth 2 (two) times daily as needed. 20 tablet 1    omeprazole (PRILOSEC) 20 MG capsule Take 1 capsule (20 mg total) by mouth once daily. 90 capsule 1    pantoprazole (PROTONIX) 40 MG tablet Take 1 tablet (40 mg total) by mouth once daily. 90 tablet 1    potassium chloride SA (K-DUR,KLOR-CON M) 10 MEQ tablet Take 1 tablet (10 mEq total) by mouth 2 (two) times daily. 60 tablet 3    predniSONE (DELTASONE) 2.5 MG tablet Take 1 tablet (2.5 mg total) by mouth once daily. 60 tablet 1    pregabalin (LYRICA) 50 MG capsule Take 1 capsule by mouth 3 (three) times daily.      torsemide (DEMADEX) 20 MG Tab Take 2 tablets (40 mg total) by mouth every morning. 180 tablet 1    triamcinolone acetonide 0.025% (KENALOG) 0.025 % cream Apply topically 2 (two) times daily. 454 g 2    TRUEPLUS INSULIN 1 mL 30 gauge x 5/16 Syrg       venlafaxine (EFFEXOR-XR) 75 MG 24 hr capsule Take 1 capsule (75 mg total) by mouth every other day. 45 capsule 1    zolpidem (AMBIEN) 5 MG Tab Take 1 tablet (5 mg total) by mouth nightly as needed. 1 tablet 0       Allergies  Review of patient's allergies indicates:  No Known Allergies    Physical Examination     Vitals:    01/29/24 1328   BP: 128/67   Pulse: 71   Resp:    Temp: 97.1 °F (36.2 °C)     Physical Exam  Vitals and nursing note reviewed.   Constitutional:       Appearance: Normal appearance.   HENT:      Head: Normocephalic.   Cardiovascular:      Rate and Rhythm: Normal rate and regular rhythm.      Pulses: Normal pulses.      Heart sounds: Normal heart sounds.   Pulmonary:      Effort: Pulmonary effort is normal. No respiratory distress.   Chest:      Chest wall: No tenderness.   Musculoskeletal:         General: Swelling and tenderness present.      Right lower leg: Edema present.      Left lower leg: Edema  present.   Skin:     General: Skin is warm and dry.      Capillary Refill: Capillary refill takes 2 to 3 seconds.      Findings: Erythema present.      Comments: See LDA for photo and measurements   Neurological:      General: No focal deficit present.      Mental Status: She is alert and oriented to person, place, and time. Mental status is at baseline.   Psychiatric:         Mood and Affect: Mood normal.         Behavior: Behavior normal.         Thought Content: Thought content normal.         Judgment: Judgment normal.     Assessment and Plan             Wound 09/27/21 Right lower;lateral Leg #3 (Active)   09/27/21     Pre-existing:    Primary Wound Type:    Side: Right   Orientation: lower;lateral   Location: Leg   Wound Number: #3   Ankle-Brachial Index:    Pulses:    Removal Indication and Assessment:    Wound Outcome:    (Retired) Wound Type:    (Retired) Wound Length (cm):    (Retired) Wound Width (cm):    (Retired) Depth (cm):    Wound Description (Comments):    Removal Indications:    Wound Image      01/29/24 1333   Dressing Appearance Saturated 01/29/24 1333   Drainage Amount Large 01/29/24 1333   Drainage Characteristics/Odor Serosanguineous 01/29/24 1333   Appearance Red;Moist;Granulating 01/29/24 1333   Tissue loss description Full thickness 01/29/24 1333   Black (%), Wound Tissue Color 0 % 01/29/24 1333   Red (%), Wound Tissue Color 100 % 01/29/24 1333   Yellow (%), Wound Tissue Color 0 % 01/29/24 1333   Periwound Area Intact;Moist 01/29/24 1333   Wound Edges Irregular 01/29/24 1333   Wound Length (cm) 3.5 cm 01/29/24 1333   Wound Width (cm) 2.1 cm 01/29/24 1333   Wound Depth (cm) 0.3 cm 01/29/24 1333   Wound Volume (cm^3) 2.205 cm^3 01/29/24 1333   Wound Surface Area (cm^2) 7.35 cm^2 01/29/24 1333   Care Cleansed with:;Antimicrobial agent 01/29/24 1333   Dressing Applied;Silicone;Calcium alginate;Collagen;Gauze;Rolled gauze 01/29/24 1333   Periwound Care Moisturizer applied 01/29/24 1333    Compression Two layer compression 01/29/24 1333   Dressing Change Due 01/30/24 01/29/24 1333     Problem List Items Addressed This Visit          Cardiac/Vascular    PVD (peripheral vascular disease)    Overview     Last arterial doppler 9/2021  Ankle brachial indices are 1.24 on the right and 1.2 on the left.            Orthopedic    Venous ulcer of right leg - Primary    Overview                                                      Current Assessment & Plan     lean leg and foot with baby shampoo and water    Paint open wounds with betadine then apply pink polymem over areas    Apply two layer compression from base of toes to below knees    Change twice a week and as needed. Home health to assist    Elevate legs as much as possible    Avoid prolonged standing    Monitor closely for s/s of infection including fever, chills, increase in pain, odor from wound, and increased redness from foot. Go to ER if any complications develop.   Keep leg elevated and avoid pressure on wound.  Diabetes:  Monitor glucose closely. Check fasting glucose and 2 hours after meals. HgA1C goal <7, fasting glucose , and 2 hours after meals <180  Hypertension:  Check blood pressure twice daily, goal <120/80  Diet:   Increase protein intake, avoid fried, fatty foods and foods high in simple carbs.   Vitamins:  Take vitamin C 1000 mg, zinc 50mg, vitamin d 5000 units, and a daily multivitamin. Ernie is a good source of protein and nutrients to aid in wound healing.   Notify Infusion Center to schedule Ruxience infusion asap  Home Health Referral sent            Future Appointments   Date Time Provider Department Center   2/12/2024  3:30 PM Fco Chaparro MD [facility]   2/26/2024  1:00 PM Shannan De Souza FNP [facility]   7/10/2024  1:30 PM Lianet Bryson ACNP [facility] CARD [facility]            Signature:  MYA Escobar  [facility]  [address]  [place] [address]  [phone]    Date of encounter: 1/29/24

## 2024-01-22 NOTE — PATIENT INSTRUCTIONS
Clean leg and foot with baby shampoo and water    Paint open wounds with betadine then apply pink polymem over areas    Apply two layer compression from base of toes to below knees    Change twice a week and as needed. Home health to assist    Elevate legs as much as possible    Avoid prolonged standing    Monitor closely for s/s of infection including fever, chills, increase in pain, odor from wound, and increased redness from foot. Go to ER if any complications develop.   Keep leg elevated and avoid pressure on wound.  Diabetes:  Monitor glucose closely. Check fasting glucose and 2 hours after meals. HgA1C goal <7, fasting glucose , and 2 hours after meals <180  Hypertension:  Check blood pressure twice daily, goal <120/80  Diet:   Increase protein intake, avoid fried, fatty foods and foods high in simple carbs.   Vitamins:  Take vitamin C 1000 mg, zinc 50mg, vitamin d 5000 units, and a daily multivitamin. Ernie is a good source of protein and nutrients to aid in wound healing.   Notify Infusion Center to schedule Ruxience infusion asap  Home Health Referral sent

## 2024-01-29 NOTE — ASSESSMENT & PLAN NOTE
lean leg and foot with baby shampoo and water    Paint open wounds with betadine then apply pink polymem over areas    Apply two layer compression from base of toes to below knees    Change twice a week and as needed. Home health to assist    Elevate legs as much as possible    Avoid prolonged standing    Monitor closely for s/s of infection including fever, chills, increase in pain, odor from wound, and increased redness from foot. Go to ER if any complications develop.   Keep leg elevated and avoid pressure on wound.  Diabetes:  Monitor glucose closely. Check fasting glucose and 2 hours after meals. HgA1C goal <7, fasting glucose , and 2 hours after meals <180  Hypertension:  Check blood pressure twice daily, goal <120/80  Diet:   Increase protein intake, avoid fried, fatty foods and foods high in simple carbs.   Vitamins:  Take vitamin C 1000 mg, zinc 50mg, vitamin d 5000 units, and a daily multivitamin. Ernie is a good source of protein and nutrients to aid in wound healing.   Notify Infusion Center to schedule Ruxience infusion asap  Home Health Referral sent

## 2024-02-19 NOTE — PROGRESS NOTES
MYA Escobar   [facility]  [address] [facility] [place] [address]  [phone]      PATIENT NAME: Delma Rae  : 1945  DATE: 24  MRN: [id number]      Billing Provider: MYA Escobar  Level of Service:   Patient PCP Information       Provider PCP Type    Fco Chaparro MD General            Reason for Visit / Chief Complaint: Venous Ulcer (Right leg)       History of Present Illness / Problem Focused Workflow     Delma Rae is a 78 y.o. female who presents to clinic for follow up on chronic-non healing wound on the right lower leg. Wound is stagnant. Son is at bedside and reports she has f/u with Rheumatology today and they are going to discuss going ahead with rituxan infusion.     Arterial doppler 2022, No areas of occlusion. No doubling of peak systolic velocity between adjacent stations to specifically suggest high-grade stenosis of a focal nature. Ankle brachial indices measure normal at 1.27 right and 1.19 left.     Significant PMH of CHF, COPD, hypertension, MI, rheumatoid arthritis, hyperlipidemia, GERD, and diabetes. Last HgA1C 7 in , diabetes managed by PCP.  Pertinent factors that delay wound healing include multiple co-morbidities, poor vascular supply, diabetes, edema, heavy drainage, excessive wound moisture and macerated tissue, large surface area, advanced age and fragile skin. Denies fever and chills.      Review of Systems     Review of Systems   Constitutional:  Positive for activity change. Negative for chills and fever.   Respiratory:  Negative for chest tightness and shortness of breath.    Cardiovascular:  Positive for leg swelling. Negative for chest pain and palpitations.   Musculoskeletal:  Positive for arthralgias and joint swelling.   Skin:  Positive for wound.        See wound assessment   Neurological:  Positive for weakness and numbness.   Psychiatric/Behavioral:  Negative  for agitation, behavioral problems, confusion and self-injury.        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 Chaidez    GERD (gastroesophageal reflux disease)     HHD (hypertensive heart disease)     Hyperlipidemia     Hypertension     MI (myocardial infarction)     OA (osteoarthritis)     Rheumatoid arthritis        Past Surgical History:   Procedure Laterality Date    HYSTERECTOMY      OOPHORECTOMY      VAGINAL DELIVERY      x 1       Social History  Ms. Delma Rae  reports that she has never smoked. She has never used smokeless tobacco. She reports that she does not drink alcohol and does not use drugs.    Family History  Ms.'s Delma Rae family history includes Cancer in her brother and father; Coronary artery disease in her brother and sister; Diabetes in her father and mother; Hypertension in her mother and son; Prostate cancer in her father.    Medications and Allergies     Medications  No outpatient medications have been marked as taking for the 2/26/24 encounter (Office Visit) with Shannan De Souza FNP.       Allergies  Review of patient's allergies indicates:  No Known Allergies    Physical Examination     There were no vitals filed for this visit.    Physical Exam  Vitals and nursing note reviewed.   Constitutional:       Appearance: Normal appearance.   HENT:      Head: Normocephalic.   Cardiovascular:      Rate and Rhythm: Normal rate and regular rhythm.      Pulses: Normal pulses.      Heart sounds: Normal heart sounds.   Pulmonary:      Effort: Pulmonary effort is normal. No respiratory distress.   Chest:      Chest wall: No tenderness.    Musculoskeletal:         General: Swelling and tenderness present.      Right lower leg: Edema present.      Left lower leg: Edema present.   Skin:     General: Skin is warm and dry.      Capillary Refill: Capillary refill takes 2 to 3 seconds.      Findings: Erythema present.      Comments: See LDA for photo and measurements   Neurological:      General: No focal deficit present.      Mental Status: She is alert and oriented to person, place, and time. Mental status is at baseline.   Psychiatric:         Mood and Affect: Mood normal.         Behavior: Behavior normal.         Thought Content: Thought content normal.         Judgment: Judgment normal.     Assessment and Plan             Wound 09/27/21 Right lower;lateral Leg #3 (Active)   09/27/21     Pre-existing:    Primary Wound Type:    Side: Right   Orientation: lower;lateral   Location: Leg   Wound Number: #3   Ankle-Brachial Index:    Pulses:    Removal Indication and Assessment:    Wound Outcome:    (Retired) Wound Type:    (Retired) Wound Length (cm):    (Retired) Wound Width (cm):    (Retired) Depth (cm):    Wound Description (Comments):    Removal Indications:    Wound Image      02/26/24 1336   Dressing Appearance Dry;Intact;Clean 02/26/24 1336   Drainage Amount Moderate 02/26/24 1336   Drainage Characteristics/Odor Yellow 02/26/24 1336   Appearance Yellow 02/26/24 1336   Tissue loss description Full thickness 02/26/24 1336   Red (%), Wound Tissue Color 90 % 02/26/24 1336   Yellow (%), Wound Tissue Color 10 % 02/26/24 1336   Periwound Area Intact 02/26/24 1336   Wound Edges Callused 02/26/24 1336   Care Cleansed with:;Antimicrobial agent 02/26/24 1336   Dressing Applied;Hydrofiber;Gauze;Rolled gauze 02/26/24 1336       Problem List Items Addressed This Visit          Cardiac/Vascular    PVD (peripheral vascular disease)    Overview     Last arterial doppler 9/2021  Ankle brachial indices are 1.24 on the right and 1.2 on the left.            Orthopedic     Venous ulcer of right leg - Primary    Overview                                                        Current Assessment & Plan     Clean leg and foot with baby shampoo and water    Paint open wounds with betadine then apply pink polymem over areas    Apply two layer compression from base of toes to below knees    Change twice a week and as needed. Home health to assist    Elevate legs as much as possible    Avoid prolonged standing    Monitor closely for s/s of infection including fever, chills, increase in pain, odor from wound, and increased redness from foot. Go to ER if any complications develop.   Keep leg elevated and avoid pressure on wound.  Diabetes:  Monitor glucose closely. Check fasting glucose and 2 hours after meals. HgA1C goal <7, fasting glucose , and 2 hours after meals <180  Hypertension:  Check blood pressure twice daily, goal <120/80  Diet:   Increase protein intake, avoid fried, fatty foods and foods high in simple carbs.   Vitamins:  Take vitamin C 1000 mg, zinc 50mg, vitamin d 5000 units, and a daily multivitamin. Ernie is a good source of protein and nutrients to aid in wound healing.   Notify Infusion Center to schedule Ruxience infusion asap  Home Health Referral sent            Future Appointments   Date Time Provider Department Center   2/26/2024  3:30 PM Charito Vargas FNP [facility]   3/7/2024  3:15 PM Fco Chaparro MD [facility] [place]   3/19/2024  1:00 PM Shannan De Souza FNP [facility]   7/10/2024  1:30 PM Lianet Bryson [facility]            Signature:  MYA Escobar  [facility]  [address] [facility] [place] [address]  [phone]    Date of encounter: 2/26/24

## 2024-02-26 NOTE — PROGRESS NOTES
"    RHEUMATOLOGY OUTPATIENT CLINIC NOTE    Subjective:       Patient ID: Delma Rae is a 78 y.o. female.    Chief Complaint: Follow-up (Follow up on Rheumatoid vasculitis)       History of Present Illness: 77 y/o female presents to the clinic today for 2 month follow up. At last appointment with Dr. Anthony Sorto, Ruxience infusion was ordered but infusion center called and patient stated she felt better and declined the infusion. Patient states today also that she feels better but when son assists her with picking her leg up to put back onto wheelchair foot rest she is hollering out in pain. Taking MTX and Plaquenil as prescribed. Also, patient has been scheduled and no showed high resolution CT appointment 5 times. Discussed with son in the room and patient importance of Ruxience infusion to help assist with controlling Rheumatoid Vasculitis and helping with wound healing. They both verbalize understanding and agree to treatment plan with addition of Ruxience infusion. Will add new therapy plan today.     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 Chaidez    GERD (gastroesophageal reflux disease)     HHD (hypertensive heart disease)     Hyperlipidemia     Hypertension     MI (myocardial infarction)     OA (osteoarthritis)     Rheumatoid arthritis      Social History     Tobacco Use    Smoking status: Never    Smokeless tobacco: Never   Substance Use Topics    Alcohol use: Never     Review of patient's allergies indicates:  No Known Allergies    Objective:   /70 (BP Location: Left arm, Patient Position: Sitting, BP Method: Large (Manual))   Pulse 67   Resp 16   Ht 5' 7" (1.702 m)   Wt 93.9 kg (207 lb)   SpO2 98%  " " BMI 32.42 kg/m²     Immunization History   Administered Date(s) Administered    COVID-19, MRNA, LN-S, PF (Pfizer) (Purple Cap) 03/11/2021, 04/01/2021    Influenza (FLUAD) - Quadrivalent - Adjuvanted - PF *Preferred* (65+) 09/21/2022, 11/02/2023       Current Outpatient Medications   Medication Instructions    albuterol (PROVENTIL/VENTOLIN HFA) 90 mcg/actuation inhaler 2 puffs, Inhalation, Every 4 hours PRN    amitriptyline (ELAVIL) 10 mg, Oral, Nightly    amLODIPine (NORVASC) 5 mg, Oral, Daily    aspirin (ECOTRIN) 81 mg, Oral, Daily    atorvastatin (LIPITOR) 40 mg, Oral    BLOOD PRESSURE CUFF Misc 1 each, Misc.(Non-Drug; Combo Route), Daily    blood sugar diagnostic (ONETOUCH ULTRA BLUE TEST STRIP) Strp 1 strip, Other, Before meals & nightly, Dx e11.9    brimonidine 0.2% (ALPHAGAN) 0.2 % Drop 1 drop, Both Eyes, 2 times daily    FARXIGA 10 mg, Oral, Daily    ferrous sulfate 325 mg, Oral, Daily    fluticasone furoate-vilanteroL (BREO ELLIPTA) 100-25 mcg/dose diskus inhaler 1 puff, Inhalation, Daily PRN    fluticasone propionate (FLONASE) 50 mcg, Each Nostril, Daily PRN    folic acid (FOLVITE) 1 mg, Oral, Daily    furosemide (LASIX) 40 mg, Oral, 3 times daily    gentamicin (GARAMYCIN) 0.1 % ointment Topical (Top), Daily    hydrALAZINE (APRESOLINE) 25 mg, Oral, 3 times daily    hydroxychloroquine (PLAQUENIL) 200 mg, Oral, Daily    insulin asp prt-insulin aspart, NovoLOG 70/30, (NOVOLOG 70/30) 100 unit/mL (70-30) Soln Subcutaneous    insulin NPH-insulin regular, 70/30, (NOVOLIN 70/30 U-100 INSULIN) 100 unit/mL (70-30) injection INJECT 40 UNITS UNDER SKIN IN THE MORNING AND 15 IN THE EVENING.    insulin syringe-needle U-100 1 mL 31 gauge x 5/16 Syrg 1 each, Subcutaneous, 2 times daily, Dx e11.9    insulin syringe-needle U-100 1/2 mL 31 gauge x 15/64" Syrg Misc.(Non-Drug; Combo Route), Use as directed with insulin     lancets Misc 1 each, skin prick, Before meals & nightly    latanoprost 0.005 % ophthalmic solution 1 " drop, Both Eyes, Daily    loratadine (CLARITIN) 10 mg, Oral, Daily    losartan (COZAAR) 100 mg, Oral, Daily    meloxicam (MOBIC) 7.5 mg, Oral, Daily    methotrexate 12.5 mg, Oral, Every 7 days, 5 tablets q7 days    metoprolol succinate (TOPROL-XL) 100 mg, Oral, Daily    multivit-min-FA-lycopen-lutein (CENTRUM SILVER) 0.4 mg-300 mcg- 250 mcg Tab 1 tablet, Oral, Daily    multivitamin/iron/folic acid (CENTRUM ULTRA WOMEN'S ORAL) 1 tablet, Oral, Daily    naproxen (NAPROSYN) 500 mg, Oral, 2 times daily PRN    omeprazole (PRILOSEC) 20 mg, Oral, Daily    pantoprazole (PROTONIX) 40 mg, Oral, Daily    phenoL (CHLORASEPTIC THROAT SPRAY) 1.4 % SprA Mucous Membrane, 2 times daily    potassium chloride SA (K-DUR,KLOR-CON M) 10 MEQ tablet 10 mEq, Oral, 2 times daily    predniSONE (DELTASONE) 2.5 mg, Oral, Daily    pregabalin (LYRICA) 50 MG capsule 1 capsule, Oral, 3 times daily    torsemide (DEMADEX) 40 mg, Oral, Every morning    triamcinolone acetonide 0.025% (KENALOG) 0.025 % cream Topical (Top), 2 times daily    TRUEPLUS INSULIN 1 mL 30 gauge x 5/16 Syrg     venlafaxine (EFFEXOR-XR) 75 mg, Oral, Every other day    zolpidem (AMBIEN) 5 mg, Oral, Nightly PRN        BIOLOGIC LABS  Lab Results   Component Value Date    HEPCAB Non-Reactive 09/11/2023        RHEUM LABS  Lab Results   Component Value Date    CRP 0.43 09/11/2023     RF/CCP: +/+    Assessment:     Review of Systems   Constitutional:  Positive for malaise/fatigue. Negative for fever.   Eyes:  Negative for pain and redness.   Respiratory:  Negative for cough and shortness of breath.    Cardiovascular:  Positive for leg swelling. Negative for chest pain.   Gastrointestinal:  Negative for nausea and vomiting.   Musculoskeletal:  Positive for joint pain. Negative for falls.        Physical Exam   Constitutional: No distress.   Cardiovascular: Normal rate.   Pulmonary/Chest: Effort normal.   Musculoskeletal:         General: Swelling and tenderness present.      Cervical  back: No rigidity.   Neurological: She is alert.   Skin: Lesion noted.   Psychiatric: Her behavior is normal.   Vitals reviewed.     Pictures taken today 2/26/24 at wound care:        I have reviewed the following:     Details / Date    []   Labs No labs drawn prior to appt    []   ACR Score     []   RAPID 3 or CDAI score     []   Imaging     []   Cardiology Procedures     [x]   Other Chart Review     Plan:          1. High risk medication use    2. Rheumatoid arthritis, involving unspecified site, unspecified whether rheumatoid factor present    3. Rheumatoid vasculitis      Continue MTX 5 tablets weekly with folic acid daily and Plaquenil 200 mg PO daily  Start Ruxience infusion ASAP  CBC and CMP today for routine monitoring  Follow up in 3-4 months or sooner if needed.     30 minutes of total time spent on the encounter, which includes face to face time and non-face to face time preparing to see the patient (eg, review of tests), Obtaining and/or reviewing separately obtained history, Documenting clinical information in the electronic or other health record, Independently interpreting results (not separately reported) and communicating results to the patient/family/caregiver, or Care coordination (not separately reported).            Charito Vargas, FNP  [facility]  [address] [facility] [place] [address]  [phone]

## 2024-02-27 PROBLEM — M05.20: Status: ACTIVE | Noted: 2024-02-27

## 2024-02-27 NOTE — TELEPHONE ENCOUNTER
----- Message from MYA Pepper sent at 2/27/2024  8:23 AM CST -----  Regarding: Lab results  Will you please call this patient and check on her. She had a critical glucose yesterday of 25 and her GFR has dropped into the 20s. Will you find out if she sees a Nephrologist also. Thank you!

## 2024-02-27 NOTE — TELEPHONE ENCOUNTER
Spoke w/ patients son, he stated that she has never seen a nephrologists and that his mom had ate before she come to her appt.

## 2024-04-04 NOTE — TELEPHONE ENCOUNTER
----- Message from Viji Xavier sent at 4/4/2024  9:01 AM CDT -----  Regarding: MEDICATION REFILL  Patient needs refill on metoprolol succinate (TOPROL-XL) 100 MG 24 hr tablet and  potassium chloride SA (K-DUR,KLOR-CON M) 10 MEQ tablet sent to [facility] in [place]

## 2024-04-10 NOTE — PATIENT INSTRUCTIONS
Clean leg and foot with baby shampoo and water    Paint open wounds with betadine then apply pink polymem over areas    Apply two layer compression from base of toes to below knees    Change twice a week and as needed. Home health to assist    Elevate legs as much as possible    Avoid prolonged standing    Monitor closely for s/s of infection including fever, chills, increase in pain, odor from wound, and increased redness from foot. Go to ER if any complications develop.   Keep leg elevated and avoid pressure on wound.  Diabetes:  Monitor glucose closely. Check fasting glucose and 2 hours after meals. HgA1C goal <7, fasting glucose , and 2 hours after meals <180  Hypertension:  Check blood pressure twice daily, goal <120/80  Diet:   Increase protein intake, avoid fried, fatty foods and foods high in simple carbs.   Vitamins:  Take vitamin C 1000 mg, zinc 50mg, vitamin d 5000 units, and a daily multivitamin. Ernie is a good source of protein and nutrients to aid in wound healing.

## 2024-04-10 NOTE — PROGRESS NOTES
MYA Escobar   [facility]  [address] [facility] [place] [address]  [phone]      PATIENT NAME: Delma Rae  : 1945  DATE: 24  MRN: [id number]      Billing Provider: MYA Escobar  Level of Service:   Patient PCP Information       Provider PCP Type    Fco Chaparro MD General            Reason for Visit / Chief Complaint: Venous Ulcer (RLE)       History of Present Illness / Problem Focused Workflow     Delma Rae is a 78 y.o. female who presents to clinic for follow up on chronic-non healing wound on the right lower leg. Wound has improved since last visit and is not bleeding as easily. Skin remains fragile and sensitive.  Son is at bedside and reports she is to have rituxan infusion next week.     Arterial doppler 2022, No areas of occlusion. No doubling of peak systolic velocity between adjacent stations to specifically suggest high-grade stenosis of a focal nature. Ankle brachial indices measure normal at 1.27 right and 1.19 left.     Significant PMH of CHF, COPD, hypertension, MI, rheumatoid arthritis, hyperlipidemia, GERD, and diabetes. Last HgA1C 7 in , diabetes managed by PCP.  Pertinent factors that delay wound healing include multiple co-morbidities, poor vascular supply, diabetes, edema, heavy drainage, excessive wound moisture and macerated tissue, large surface area, advanced age and fragile skin. Denies fever and chills.        Review of Systems     Review of Systems   Constitutional:  Positive for activity change. Negative for chills and fever.   Respiratory:  Negative for chest tightness and shortness of breath.    Cardiovascular:  Positive for leg swelling. Negative for chest pain and palpitations.   Musculoskeletal:  Positive for arthralgias and joint swelling.   Skin:  Positive for wound.        See wound assessment   Neurological:  Positive for weakness and numbness.  "  Psychiatric/Behavioral:  Negative for agitation, behavioral problems, confusion and self-injury.        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 Chaidez    GERD (gastroesophageal reflux disease)     HHD (hypertensive heart disease)     Hyperlipidemia     Hypertension     MI (myocardial infarction)     OA (osteoarthritis)     Rheumatoid arthritis        Past Surgical History:   Procedure Laterality Date    HYSTERECTOMY      OOPHORECTOMY      VAGINAL DELIVERY      x 1       Social History  Ms. Delma Rae  reports that she has never smoked. She has never used smokeless tobacco. She reports that she does not drink alcohol and does not use drugs.    Family History  Ms.'s Delma Rae family history includes Cancer in her brother and father; Coronary artery disease in her brother and sister; Diabetes in her father and mother; Hypertension in her mother and son; Prostate cancer in her father.    Medications and Allergies     Medications  Current Outpatient Medications   Medication Sig Dispense Refill    insulin syringe-needle U-100 1/2 mL 31 gauge x 15/64" Syrg by Misc.(Non-Drug; Combo Route) route. Use as directed with insulin      lancets Misc 1 each by skin prick route 4 (four) times daily before meals and nightly. 200 each 5    latanoprost 0.005 % ophthalmic solution Place 1 drop into both eyes once daily.      loratadine (CLARITIN) 10 mg tablet Take 1 tablet (10 mg total) by mouth once daily. 90 tablet 1    losartan (COZAAR) 100 MG tablet Take 1 tablet (100 mg total) by mouth once daily. 90 tablet 5    methotrexate 2.5 MG Tab Take 5 tablets (12.5 mg total) by mouth every 7 days. 5 " tablets q7 days 60 tablet 1    metoprolol succinate (TOPROL-XL) 100 MG 24 hr tablet Take 1 tablet (100 mg total) by mouth once daily. 90 tablet 1    multivit-min-FA-lycopen-lutein (CENTRUM SILVER) 0.4 mg-300 mcg- 250 mcg Tab Take 1 tablet by mouth once daily.      multivitamin/iron/folic acid (CENTRUM ULTRA WOMEN'S ORAL) Take 1 tablet by mouth once daily.      omeprazole (PRILOSEC) 20 MG capsule Take 1 capsule (20 mg total) by mouth once daily. 90 capsule 1    pantoprazole (PROTONIX) 40 MG tablet Take 1 tablet (40 mg total) by mouth once daily. 90 tablet 1    potassium chloride SA (K-DUR,KLOR-CON M) 10 MEQ tablet Take 1 tablet (10 mEq total) by mouth 2 (two) times daily. 180 tablet 1    predniSONE (DELTASONE) 2.5 MG tablet Take 1 tablet (2.5 mg total) by mouth once daily. 60 tablet 1    pregabalin (LYRICA) 50 MG capsule Take 1 capsule by mouth 3 (three) times daily.      torsemide (DEMADEX) 20 MG Tab Take 2 tablets (40 mg total) by mouth every morning. 180 tablet 1    triamcinolone acetonide 0.025% (KENALOG) 0.025 % cream Apply topically 2 (two) times daily. 454 g 2    TRUEPLUS INSULIN 1 mL 30 gauge x 5/16 Syrg       venlafaxine (EFFEXOR-XR) 75 MG 24 hr capsule Take 1 capsule (75 mg total) by mouth every other day. 45 capsule 1    zolpidem (AMBIEN) 5 MG Tab Take 1 tablet (5 mg total) by mouth nightly as needed. 1 tablet 0    albuterol (PROVENTIL/VENTOLIN HFA) 90 mcg/actuation inhaler Inhale 2 puffs into the lungs every 4 (four) hours as needed for Wheezing or Shortness of Breath. 18 g 1    amitriptyline (ELAVIL) 10 MG tablet Take 1 tablet (10 mg total) by mouth every evening. (Patient not taking: Reported on 1/10/2024) 90 tablet 1    amLODIPine (NORVASC) 5 MG tablet Take 1 tablet (5 mg total) by mouth once daily. (Patient not taking: Reported on 1/10/2024) 90 tablet 1    aspirin (ECOTRIN) 81 MG EC tablet Take 81 mg by mouth once daily.      atorvastatin (LIPITOR) 40 MG tablet Take 1 tablet (40 mg total) by mouth  once daily. 90 tablet 1    BLOOD PRESSURE CUFF Misc 1 each by Misc.(Non-Drug; Combo Route) route once daily. 1 each 0    blood sugar diagnostic (ONETOUCH ULTRA BLUE TEST STRIP) Strp 1 strip by Other route 4 (four) times daily before meals and nightly. Dx e11.9 200 strip 5    brimonidine 0.2% (ALPHAGAN) 0.2 % Drop Place 1 drop into both eyes 2 (two) times daily.      dapagliflozin propanediol (FARXIGA) 10 mg tablet Take 1 tablet (10 mg total) by mouth once daily. 30 tablet 11    ferrous sulfate 325 (65 FE) MG EC tablet Take 1 tablet (325 mg total) by mouth once daily. (Patient not taking: Reported on 1/10/2024) 90 tablet 1    fluticasone furoate-vilanteroL (BREO ELLIPTA) 100-25 mcg/dose diskus inhaler Inhale 1 puff into the lungs daily as needed.      fluticasone propionate (FLONASE) 50 mcg/actuation nasal spray 1 spray (50 mcg total) by Each Nostril route daily as needed for Rhinitis. (Patient not taking: Reported on 1/10/2024) 16 g 1    folic acid (FOLVITE) 1 MG tablet Take 1 tablet (1 mg total) by mouth once daily. 90 tablet 1    furosemide (LASIX) 40 MG tablet Take 1 tablet (40 mg total) by mouth 3 (three) times daily. 90 tablet 1    gentamicin (GARAMYCIN) 0.1 % ointment Apply topically Daily. 90 g 2    hydrALAZINE (APRESOLINE) 25 MG tablet Take 1 tablet (25 mg total) by mouth 3 (three) times daily. 270 tablet 1    hydroxychloroquine (PLAQUENIL) 200 mg tablet Take 1 tablet (200 mg total) by mouth once daily. 90 tablet 1    insulin asp prt-insulin aspart, NovoLOG 70/30, (NOVOLOG 70/30) 100 unit/mL (70-30) Soln Subcutaneous      insulin NPH-insulin regular, 70/30, (NOVOLIN 70/30 U-100 INSULIN) 100 unit/mL (70-30) injection INJECT 40 UNITS UNDER SKIN IN THE MORNING AND 15 IN THE EVENING. 50 mL 6    insulin syringe-needle U-100 1 mL 31 gauge x 5/16 Syrg Inject 1 each into the skin 2 (two) times a day. Dx e11.9 200 each 3    meloxicam (MOBIC) 7.5 MG tablet Take 1 tablet (7.5 mg total) by mouth once daily. (Patient not  taking: Reported on 1/10/2024) 30 tablet 1    phenoL (CHLORASEPTIC THROAT SPRAY) 1.4 % SprA by Mucous Membrane route 2 (two) times a day. (Patient not taking: Reported on 1/10/2024) 177 mL 0     No current facility-administered medications for this visit.       Allergies  Review of patient's allergies indicates:  No Known Allergies    Physical Examination     Vitals:    04/18/24 1333   BP: (!) 92/58   Pulse: 71   Resp: 20   Temp: 98 °F (36.7 °C)       Physical Exam  Vitals and nursing note reviewed.   Constitutional:       Appearance: Normal appearance.   HENT:      Head: Normocephalic.   Cardiovascular:      Rate and Rhythm: Normal rate and regular rhythm.      Pulses: Normal pulses.      Heart sounds: Normal heart sounds.   Pulmonary:      Effort: Pulmonary effort is normal. No respiratory distress.   Chest:      Chest wall: No tenderness.   Musculoskeletal:         General: Swelling and tenderness present.      Right lower leg: Edema present.      Left lower leg: Edema present.   Skin:     General: Skin is warm and dry.      Capillary Refill: Capillary refill takes 2 to 3 seconds.      Findings: Erythema present.      Comments: See LDA for photo and measurements   Neurological:      General: No focal deficit present.      Mental Status: She is alert and oriented to person, place, and time. Mental status is at baseline.   Psychiatric:         Mood and Affect: Mood normal.         Behavior: Behavior normal.         Thought Content: Thought content normal.         Judgment: Judgment normal.       Assessment and Plan             Wound 09/27/21 Right lower;lateral Leg #3 (Active)   09/27/21     Pre-existing:    Primary Wound Type:    Side: Right   Orientation: lower;lateral   Location: Leg   Wound Number: #3   Ankle-Brachial Index:    Pulses:    Removal Indication and Assessment:    Wound Outcome:    (Retired) Wound Type:    (Retired) Wound Length (cm):    (Retired) Wound Width (cm):    (Retired) Depth (cm):    Wound  Description (Comments):    Removal Indications:    Wound Image     04/18/24 1336   Dressing Appearance Moist drainage 04/18/24 1336   Drainage Amount Moderate 04/18/24 1336   Drainage Characteristics/Odor Serosanguineous 04/18/24 1336   Appearance Pink;Moist;Granulating 04/18/24 1336   Tissue loss description Full thickness 04/18/24 1336   Black (%), Wound Tissue Color 0 % 04/18/24 1336   Red (%), Wound Tissue Color 100 % 04/18/24 1336   Yellow (%), Wound Tissue Color 0 % 04/18/24 1336   Periwound Area Intact;Moist 04/18/24 1336   Wound Edges Irregular 04/18/24 1336   Wound Length (cm) 1.3 cm 04/18/24 1336   Wound Width (cm) 1.5 cm 04/18/24 1336   Wound Depth (cm) 0.3 cm 04/18/24 1336   Wound Volume (cm^3) 0.585 cm^3 04/18/24 1336   Wound Surface Area (cm^2) 1.95 cm^2 04/18/24 1336   Care Cleansed with:;Antimicrobial agent;Soap and water 04/18/24 1336   Dressing Applied;Hydrofiber;Gauze;Compression wrap 04/18/24 1336   Periwound Care Moisturizer applied 04/18/24 1336   Compression Two layer compression 04/18/24 1336   Dressing Change Due 04/19/24 04/18/24 1336         Problem List Items Addressed This Visit          Orthopedic    Venous ulcer of right leg - Primary    Overview                                                               Future Appointments   Date Time Provider Department Center   5/9/2024  1:00 PM Shannan De Souza FNP [facility]   7/10/2024  1:30 PM Lianet Bryson ACNP [facility] C[facility]   10/1/2024  3:20 PM Claudia Hector DO [facility] NEPH [facility]            Signature:  MYA Escobar  [facility]  [address]  [place] [address]  [phone]    Date of encounter: 4/18/24

## 2024-04-23 PROBLEM — G89.29 OTHER CHRONIC PAIN: Status: ACTIVE | Noted: 2024-01-01

## 2024-04-23 PROBLEM — I50.9 CONGESTIVE HEART FAILURE: Status: ACTIVE | Noted: 2024-01-01

## 2024-04-23 NOTE — PROGRESS NOTES
"Subjective:       Patient ID: Delma Rae is a 78 y.o. female.    Chief Complaint: Follow-up    HPI  .  Patient presents with chronic severe history of cardiomyopathy arthritis rheumatoid vasculitis congestive heart stated she has just not feeling refills of medicines and she wants something immediate relief    Current Medications:    Current Outpatient Medications:     albuterol (PROVENTIL/VENTOLIN HFA) 90 mcg/actuation inhaler, Inhale 2 puffs into the lungs every 4 (four) hours as needed for Wheezing or Shortness of Breath., Disp: 18 g, Rfl: 1    aspirin (ECOTRIN) 81 MG EC tablet, Take 81 mg by mouth once daily., Disp: , Rfl:     BLOOD PRESSURE CUFF Misc, 1 each by Misc.(Non-Drug; Combo Route) route once daily., Disp: 1 each, Rfl: 0    blood sugar diagnostic (ONETOUCH ULTRA BLUE TEST STRIP) Strp, 1 strip by Other route 4 (four) times daily before meals and nightly. Dx e11.9, Disp: 200 strip, Rfl: 5    brimonidine 0.2% (ALPHAGAN) 0.2 % Drop, Place 1 drop into both eyes 2 (two) times daily., Disp: , Rfl:     fluticasone furoate-vilanteroL (BREO ELLIPTA) 100-25 mcg/dose diskus inhaler, Inhale 1 puff into the lungs daily as needed., Disp: , Rfl:     gentamicin (GARAMYCIN) 0.1 % ointment, Apply topically Daily., Disp: 90 g, Rfl: 2    insulin asp prt-insulin aspart, NovoLOG 70/30, (NOVOLOG 70/30) 100 unit/mL (70-30) Soln, Subcutaneous, Disp: , Rfl:     insulin NPH-insulin regular, 70/30, (NOVOLIN 70/30 U-100 INSULIN) 100 unit/mL (70-30) injection, INJECT 40 UNITS UNDER SKIN IN THE MORNING AND 15 IN THE EVENING., Disp: 50 mL, Rfl: 6    insulin syringe-needle U-100 1 mL 31 gauge x 5/16 Syrg, Inject 1 each into the skin 2 (two) times a day. Dx e11.9, Disp: 200 each, Rfl: 3    insulin syringe-needle U-100 1/2 mL 31 gauge x 15/64" Syrg, by Misc.(Non-Drug; Combo Route) route. Use as directed with insulin, Disp: , Rfl:     lancets Misc, 1 each by skin prick route 4 (four) times daily before meals and nightly., Disp: 200 " each, Rfl: 5    latanoprost 0.005 % ophthalmic solution, Place 1 drop into both eyes once daily., Disp: , Rfl:     loratadine (CLARITIN) 10 mg tablet, Take 1 tablet (10 mg total) by mouth once daily., Disp: 90 tablet, Rfl: 1    metoprolol succinate (TOPROL-XL) 100 MG 24 hr tablet, Take 1 tablet (100 mg total) by mouth once daily., Disp: 90 tablet, Rfl: 1    multivit-min-FA-lycopen-lutein (CENTRUM SILVER) 0.4 mg-300 mcg- 250 mcg Tab, Take 1 tablet by mouth once daily., Disp: , Rfl:     multivitamin/iron/folic acid (CENTRUM ULTRA WOMEN'S ORAL), Take 1 tablet by mouth once daily., Disp: , Rfl:     potassium chloride SA (K-DUR,KLOR-CON M) 10 MEQ tablet, Take 1 tablet (10 mEq total) by mouth 2 (two) times daily., Disp: 180 tablet, Rfl: 1    pregabalin (LYRICA) 50 MG capsule, Take 1 capsule by mouth 3 (three) times daily., Disp: , Rfl:     triamcinolone acetonide 0.025% (KENALOG) 0.025 % cream, Apply topically 2 (two) times daily., Disp: 454 g, Rfl: 2    TRUEPLUS INSULIN 1 mL 30 gauge x 5/16 Syrg, , Disp: , Rfl:     zolpidem (AMBIEN) 5 MG Tab, Take 1 tablet (5 mg total) by mouth nightly as needed., Disp: 1 tablet, Rfl: 0    amitriptyline (ELAVIL) 10 MG tablet, Take 1 tablet (10 mg total) by mouth every evening. (Patient not taking: Reported on 1/10/2024), Disp: 90 tablet, Rfl: 1    amLODIPine (NORVASC) 5 MG tablet, Take 1 tablet (5 mg total) by mouth once daily. (Patient not taking: Reported on 1/10/2024), Disp: 90 tablet, Rfl: 1    atorvastatin (LIPITOR) 40 MG tablet, Take 1 tablet (40 mg total) by mouth once daily., Disp: 90 tablet, Rfl: 1    dapagliflozin propanediol (FARXIGA) 10 mg tablet, Take 1 tablet (10 mg total) by mouth once daily., Disp: 30 tablet, Rfl: 11    ferrous sulfate 325 (65 FE) MG EC tablet, Take 1 tablet (325 mg total) by mouth once daily. (Patient not taking: Reported on 1/10/2024), Disp: 90 tablet, Rfl: 1    fluticasone propionate (FLONASE) 50 mcg/actuation nasal spray, 1 spray (50 mcg total) by  Each Nostril route daily as needed for Rhinitis. (Patient not taking: Reported on 1/10/2024), Disp: 16 g, Rfl: 1    folic acid (FOLVITE) 1 MG tablet, Take 1 tablet (1 mg total) by mouth once daily., Disp: 90 tablet, Rfl: 1    furosemide (LASIX) 40 MG tablet, Take 1 tablet (40 mg total) by mouth 3 (three) times daily., Disp: 90 tablet, Rfl: 1    hydrALAZINE (APRESOLINE) 25 MG tablet, Take 1 tablet (25 mg total) by mouth 3 (three) times daily., Disp: 270 tablet, Rfl: 1    hydroxychloroquine (PLAQUENIL) 200 mg tablet, Take 1 tablet (200 mg total) by mouth once daily., Disp: 90 tablet, Rfl: 1    losartan (COZAAR) 100 MG tablet, Take 1 tablet (100 mg total) by mouth once daily., Disp: 90 tablet, Rfl: 5    meloxicam (MOBIC) 7.5 MG tablet, Take 1 tablet (7.5 mg total) by mouth once daily. (Patient not taking: Reported on 1/10/2024), Disp: 30 tablet, Rfl: 1    methotrexate 2.5 MG Tab, Take 5 tablets (12.5 mg total) by mouth every 7 days. 5 tablets q7 days, Disp: 60 tablet, Rfl: 1    omeprazole (PRILOSEC) 20 MG capsule, Take 1 capsule (20 mg total) by mouth once daily., Disp: 90 capsule, Rfl: 1    pantoprazole (PROTONIX) 40 MG tablet, Take 1 tablet (40 mg total) by mouth once daily., Disp: 90 tablet, Rfl: 1    phenoL (CHLORASEPTIC THROAT SPRAY) 1.4 % SprA, by Mucous Membrane route 2 (two) times a day. (Patient not taking: Reported on 1/10/2024), Disp: 177 mL, Rfl: 0    predniSONE (DELTASONE) 2.5 MG tablet, Take 1 tablet (2.5 mg total) by mouth once daily., Disp: 60 tablet, Rfl: 1    torsemide (DEMADEX) 20 MG Tab, Take 2 tablets (40 mg total) by mouth every morning., Disp: 180 tablet, Rfl: 1    venlafaxine (EFFEXOR-XR) 75 MG 24 hr capsule, Take 1 capsule (75 mg total) by mouth every other day., Disp: 45 capsule, Rfl: 1           ROS  Twelve point system reviewed, unremarkable except for stated above in HPI.        Objective:         Vitals:    04/18/24 1537   BP: 106/60   BP Location: Left arm   Patient Position: Sitting   BP  "Method: Large (Automatic)   Pulse: 80   Resp: 18   Temp: 97.8 °F (36.6 °C)   TempSrc: Temporal   SpO2: 95%   Weight: Comment: unable to stand   Height: 5' 7" (1.702 m)        Physical Exam     Patient is awake alert oriented person place and  Lungs are clear to auscultation bilaterally no crackles or wheezes   Cardiovascular S1-S2 regular rate and rhythm no murmurs rubs or gallops   Abdomen is soft positive bowel sounds nontender, extremities no clubbing cyanosis edema  Neuro no focal neurological deficits  Skin warm and dry.     Last Labs:     No visits with results within 1 Month(s) from this visit.   Latest known visit with results is:   Lab Visit on 02/26/2024   Component Date Value    Sodium 02/26/2024 142     Potassium 02/26/2024 4.2     Chloride 02/26/2024 105     CO2 02/26/2024 35 (H)     Anion Gap 02/26/2024 6 (L)     Glucose 02/26/2024 25 (LL)     BUN 02/26/2024 22 (H)     Creatinine 02/26/2024 1.96 (H)     BUN/Creatinine Ratio 02/26/2024 11     Calcium 02/26/2024 9.9     Total Protein 02/26/2024 7.9     Albumin 02/26/2024 3.6     Globulin 02/26/2024 4.3 (H)     A/G Ratio 02/26/2024 0.8     Bilirubin, Total 02/26/2024 0.4     Alk Phos 02/26/2024 102     ALT 02/26/2024 16     AST 02/26/2024 18     eGFR 02/26/2024 26 (L)     WBC 02/26/2024 8.74     RBC 02/26/2024 4.73     Hemoglobin 02/26/2024 12.0     Hematocrit 02/26/2024 36.3 (L)     MCV 02/26/2024 76.7 (L)     MCH 02/26/2024 25.4 (L)     MCHC 02/26/2024 33.1     RDW 02/26/2024 15.7 (H)     Platelet Count 02/26/2024 300     MPV 02/26/2024 12.4     Neutrophils % 02/26/2024 81.2 (H)     Lymphocytes % 02/26/2024 8.5 (L)     Monocytes % 02/26/2024 7.0 (H)     Eosinophils % 02/26/2024 2.2     Basophils % 02/26/2024 0.5     Immature Granulocytes % 02/26/2024 0.6 (H)     nRBC, Auto 02/26/2024 0.0     Neutrophils, Abs 02/26/2024 7.11     Lymphocytes, Absolute 02/26/2024 0.74 (L)     Monocytes, Absolute 02/26/2024 0.61     Eosinophils, Absolute 02/26/2024 0.19     " Basophils, Absolute 02/26/2024 0.04     Immature Granulocytes, A* 02/26/2024 0.05 (H)     nRBC, Absolute 02/26/2024 0.00     Diff Type 02/26/2024 Auto        Last Imaging:  Echo    Left Ventricle: The left ventricle is mildly dilated. Normal wall   thickness. There is moderately reduced systolic function with a visually   estimated ejection fraction of 30 - 35%.    Left Atrium: Left atrium is mildly dilated.    Right Ventricle: Normal right ventricular cavity size.    Aortic Valve: The aortic valve is a trileaflet valve. There is moderate   aortic valve sclerosis. There is mild aortic regurgitation.    Mitral Valve: There is mild regurgitation.    Tricuspid Valve: There is mild regurgitation.         **Labs and x-rays personally reviewed by me    ** reviewed           Assessment & Plan:       1. Other chronic pain  -     ketorolac injection 15 mg    2. Cardiomyopathy, unspecified type  Overview:  LVEF 35% by LVgram 7/17/2020  LVEF 35-40 % by echo 3/9/2020  LVEF 30-35% by echo 3/11/2023  She has been on max tolerated GDMT.  NYHA class III symptoms.    Patient meets criteria for ICD implant. I have d/w the patient and her son r/b/a of ICD implant. The patient's voiced understanding but would like to reflect before making a decision. She will notify our office should she decide to proceed prior to her return on 12/6/2023.    Orders:  -     dapagliflozin propanediol (FARXIGA) 10 mg tablet; Take 1 tablet (10 mg total) by mouth once daily.  Dispense: 30 tablet; Refill: 11    3. Rheumatoid arthritis, involving unspecified site, unspecified whether rheumatoid factor present  -     folic acid (FOLVITE) 1 MG tablet; Take 1 tablet (1 mg total) by mouth once daily.  Dispense: 90 tablet; Refill: 1  -     hydroxychloroquine (PLAQUENIL) 200 mg tablet; Take 1 tablet (200 mg total) by mouth once daily.  Dispense: 90 tablet; Refill: 1  -     methotrexate 2.5 MG Tab; Take 5 tablets (12.5 mg total) by mouth every 7 days. 5 tablets  q7 days  Dispense: 60 tablet; Refill: 1    4. Rheumatoid vasculitis  -     hydroxychloroquine (PLAQUENIL) 200 mg tablet; Take 1 tablet (200 mg total) by mouth once daily.  Dispense: 90 tablet; Refill: 1  -     methotrexate 2.5 MG Tab; Take 5 tablets (12.5 mg total) by mouth every 7 days. 5 tablets q7 days  Dispense: 60 tablet; Refill: 1    5. Congestive heart failure, unspecified HF chronicity, unspecified heart failure type  -     losartan (COZAAR) 100 MG tablet; Take 1 tablet (100 mg total) by mouth once daily.  Dispense: 90 tablet; Refill: 5    Other orders  -     atorvastatin (LIPITOR) 40 MG tablet; Take 1 tablet (40 mg total) by mouth once daily.  Dispense: 90 tablet; Refill: 1  -     furosemide (LASIX) 40 MG tablet; Take 1 tablet (40 mg total) by mouth 3 (three) times daily.  Dispense: 90 tablet; Refill: 1  -     hydrALAZINE (APRESOLINE) 25 MG tablet; Take 1 tablet (25 mg total) by mouth 3 (three) times daily.  Dispense: 270 tablet; Refill: 1  -     omeprazole (PRILOSEC) 20 MG capsule; Take 1 capsule (20 mg total) by mouth once daily.  Dispense: 90 capsule; Refill: 1  -     pantoprazole (PROTONIX) 40 MG tablet; Take 1 tablet (40 mg total) by mouth once daily.  Dispense: 90 tablet; Refill: 1  -     predniSONE (DELTASONE) 2.5 MG tablet; Take 1 tablet (2.5 mg total) by mouth once daily.  Dispense: 60 tablet; Refill: 1  -     torsemide (DEMADEX) 20 MG Tab; Take 2 tablets (40 mg total) by mouth every morning.  Dispense: 180 tablet; Refill: 1  -     venlafaxine (EFFEXOR-XR) 75 MG 24 hr capsule; Take 1 capsule (75 mg total) by mouth every other day.  Dispense: 45 capsule; Refill: 1            Fco Chaparro MD

## 2024-05-23 NOTE — TELEPHONE ENCOUNTER
Call received from THIERRY Ramírez from [facility] that pat has a significant decline in condition, pat is refusing care,eating and grooming. Referral sent to Dr. Chaparro for possible hospice or nursing home placement

## 2024-05-23 NOTE — ED NOTES
Pt son reports pt hasn't been eating x 2 weeks and woke up today feeling weak, pt was going to table to eat some soup and had a syncope episode, pt stop breathing prior to ems arrival

## 2024-05-24 NOTE — ED PROVIDER NOTES
Encounter Date: 5/23/2024       History     Chief Complaint   Patient presents with    Cardiac Arrest     Pt arrived via ems, cpr in progress with Ameya x 40 minutes prior to ED arrival, ET Tube in place, 45 IO to left lower leg, ns infusing, 4 rounds of epi prior to arrival to ed     PT IS A 78 YR OLD BF THAT PRESENTS DOA. EMS WAS CALLED TO [facility]ENE FOR DYSPNEA AND WEAKNESS AND ON ARRIVAL PT WAS WITHOUT RESPIRATIONS OR PULSE WITH ACLS PROTOCOL FOLLOWED WITH INTUBATION AND CPR PER AMEYA DEVICE, EPI X 4 THROUGH IO LLE FOR 40 MINUTES WITHOUT RESPONSE.   PT'S SON STATES PT HAS HAD ANOREXIA, MALAISE AND WEAKNESS FOR 2 WEEKS AND TODAY BECAME DYSPNEIC AND PROGRESSIVELY WEAK. PT HAS HX CAD WITH CABG 2004 ; EF 30 % PER ECHO AND ICD PLACEMENT HAD BEEN RECOMMENDED 01/2024 AND THEY WERE CONSIDERING THIS PROCEDURE.        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 Chaidez  Cancer [C80.1]      Echo     Interpretation Summary  · The left ventricle is normal in size with moderately decreased systolic function.  · The estimated ejection fraction is 30%.  · Grade I left ventricular diastolic dysfunction.  · Mild right ventricular enlargement with normal right ventricular systolic function.  · Normal central venous pressure (3 mmHg).  · Mild mitral regurgitation.      Heart cath 7/17/2020  Dr. Perez  1. LAD: Prox LAD 90% stenosis with ABISAI III  flow  2. LCX: mid Lcx- 40% with ABISAI III flow  3. RCA: distal RCA 35% stenosis  4. LVEF 35%  5. Recommendations: optimize medical management     7/2004 Dr. Leena TRIVEDI top the LAD and SVG to the OM1      The history is provided by a relative, the EMS personnel and medical records.     Review of patient's allergies indicates:  No Known Allergies  Past Medical History:   Diagnosis Date    Anxiety disorder 11/24/2014    Arthritis     Cancer     Cardiomyopathy     COPD (chronic obstructive pulmonary disease)     Coronary artery disease     Diabetes mellitus, type 2     Dilated cardiomyopathy 01/04/2016    DJD of right shoulder 10/08/2014    Fibrosis of lung 08/04/2015    F/uU BY dr. Chaidez    GERD (gastroesophageal reflux disease)     HHD (hypertensive heart disease)     Hyperlipidemia     Hypertension     MI (myocardial infarction)     OA (osteoarthritis)     Rheumatoid arthritis      Past Surgical History:   Procedure Laterality Date    HYSTERECTOMY      OOPHORECTOMY      VAGINAL DELIVERY      x 1     Family History   Problem Relation Name Age of Onset    Diabetes Mother          age 60    Hypertension Mother      Diabetes Father      Prostate cancer Father      Cancer Father          Prostate, age 85    Coronary artery disease Sister          age 72    Cancer Brother      Coronary artery disease Brother      Hypertension Son          age 50     Social History     Tobacco Use    Smoking status: Never    Smokeless tobacco: Never   Substance Use Topics    Alcohol use: Never    Drug use: Never     Review of Systems   Unable to perform ROS: Acuity of condition       Physical Exam     Initial Vitals [05/23/24 1627]   BP Pulse Resp Temp SpO2   -- -- -- 98.1 °F (36.7 °C) --      MAP       --         Physical Exam    Nursing note and vitals reviewed.  Constitutional: She is cooperative.   CHRONICALLY ILL APPEARING BF WITHOUT CARDIAC OR RESPIRATORY ACTIVITY   HENT:   Head: Normocephalic and atraumatic.   Eyes:   PUPILS  FIXED AND DILATED   Neck: Trachea normal.   Cardiovascular:            Pulses:       Radial pulses are 3+ on the right side and 3+ on the left side.        Dorsalis pedis pulses are 3+ on the right side and 3+ on the left side.   NO CARDIAC ACTIVITY NOTED   Pulmonary/Chest:   NEG BS   Abdominal: Abdomen is soft.   Musculoskeletal:      Right shoulder: Normal.      Left shoulder: Normal.      Right upper arm: Normal.      Left upper arm: Normal.      Right elbow: Normal.      Left elbow: Normal.      Right forearm: Normal.      Left forearm: Normal.      Right wrist: Normal.      Left wrist: Normal.      Right hand: Normal.      Left hand: Normal.      Comments: PT IS UNRESPONSIVE     Lymphadenopathy:     She has no axillary adenopathy.   Neurological: She displays a negative Romberg sign. GCS eye subscore is 4. GCS verbal subscore is 5. GCS motor subscore is 6.   Reflex Scores:       Bicep reflexes are 2+ on the right side and 2+ on the left side.       Patellar reflexes are 2+ on the right side and 2+ on the left side.  UNRESPONSIVE   Skin:   COOL, CYANOTIC  PRESSURE ULCERS B BUTTOCKS/SACRUM     Psychiatric: Her speech is normal. Cognition and memory are normal.            Medical Screening Exam   See Full Note    ED Course   Procedures  Labs Reviewed - No data to display       Imaging Results    None          Medications - No data to display  Medical Decision Making  PT IS A 78 YR OLD BF THAT PRESENTS DOA. EMS WAS CALLED TO [facility]ENE FOR DYSPNEA AND WEAKNESS AND ON ARRIVAL PT WAS WITHOUT RESPIRATIONS OR PULSE WITH ACLS PROTOCOL FOLLOWED WITH INTUBATION AND CPR PER DERRELL DEVICE, EPI X 4 THROUGH IO LLE FOR 40 MINUTES WITHOUT RESPONSE.   PT'S SON STATES PT HAS HAD ANOREXIA, MALAISE AND WEAKNESS FOR 2 WEEKS AND TODAY BECAME DYSPNEIC AND PROGRESSIVELY WEAK. PT HAS HX CAD WITH CABG  ; EF 30 % PER ECHO AND ICD PLACEMENT HAD BEEN RECOMMENDED 2024 AND THEY WERE CONSIDERING THIS PROCEDURE.        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 Chaidez  Cancer [C80.1]      Echo     Interpretation Summary  · The left ventricle is normal in size with moderately decreased systolic function.  · The estimated ejection fraction is 30%.  · Grade I left ventricular diastolic dysfunction.  · Mild right ventricular enlargement with normal right ventricular systolic function.  · Normal central venous pressure (3 mmHg).  · Mild mitral regurgitation.      Heart cath 2020  Dr. Perez  1. LAD: Prox LAD 90% stenosis with ABISAI III flow  2. LCX: mid Lcx- 40% with ABISAI III flow  3. RCA: distal RCA 35% stenosis  4. LVEF 35%  5. Recommendations: optimize medical management     2004 Dr. Leena TRIVEDI top the LAD and SVG to the OM1      Amount and/or Complexity of Data Reviewed  Independent Historian: EMS     Details: SON  Discussion of management or test interpretation with external provider(s): EXAM  ASYSTOLE DESPITE EMS ACLS PROTOCOL  DOA  PT PRONOUNCED ON ARRIVAL                                      Clinical Impression:   Final diagnoses:  [I46.9] Cardiopulmonary arrest (Primary)  [R99] Death        ED Disposition Condition                       Shweta Smith MD  [id number]

## 2024-05-27 ENCOUNTER — EXTERNAL HOME HEALTH (OUTPATIENT)
Dept: HOME HEALTH SERVICES | Facility: HOSPITAL | Age: 79
End: 2024-05-27
Payer: COMMERCIAL

## 2024-05-31 ENCOUNTER — EXTERNAL CHRONIC CARE MANAGEMENT (OUTPATIENT)
Dept: FAMILY MEDICINE | Facility: CLINIC | Age: 79
End: 2024-05-31
Payer: COMMERCIAL

## 2024-06-17 ENCOUNTER — DOCUMENT SCAN (OUTPATIENT)
Dept: HOME HEALTH SERVICES | Facility: HOSPITAL | Age: 79
End: 2024-06-17
Payer: COMMERCIAL